# Patient Record
Sex: MALE | Race: WHITE | NOT HISPANIC OR LATINO | Employment: UNEMPLOYED | URBAN - METROPOLITAN AREA
[De-identification: names, ages, dates, MRNs, and addresses within clinical notes are randomized per-mention and may not be internally consistent; named-entity substitution may affect disease eponyms.]

---

## 2022-10-11 DIAGNOSIS — F20.1 DISORGANIZED SCHIZOPHRENIA, CHRONIC CONDITION (HCC): Primary | ICD-10-CM

## 2022-10-11 RX ORDER — CLOZAPINE 100 MG/1
100 TABLET ORAL
Qty: 30 TABLET | Refills: 0 | Status: SHIPPED | OUTPATIENT
Start: 2022-10-11

## 2022-10-11 NOTE — TELEPHONE ENCOUNTER
Medication Refill Request     Name of Medication Clozapine  Dose/Frequency 100mg qhs  Quantity 30  Verified pharmacy   [x]  Verified ordering Provider   [x]  Does patient have enough for the next 3 days? Yes [] No [x]  Does patient have a follow-up appointment scheduled?  Yes [x] No []   If so when is appointment: 11/07/2022

## 2022-11-02 ENCOUNTER — HOSPITAL ENCOUNTER (INPATIENT)
Facility: HOSPITAL | Age: 62
LOS: 15 days | Discharge: NURSING, REHAB OR LONG-TERM CARE | End: 2022-11-18
Attending: EMERGENCY MEDICINE | Admitting: INTERNAL MEDICINE

## 2022-11-02 ENCOUNTER — APPOINTMENT (EMERGENCY)
Dept: RADIOLOGY | Facility: HOSPITAL | Age: 62
End: 2022-11-02

## 2022-11-02 DIAGNOSIS — F20.1 DISORGANIZED SCHIZOPHRENIA (HCC): ICD-10-CM

## 2022-11-02 DIAGNOSIS — D72.829 LEUKOCYTOSIS: ICD-10-CM

## 2022-11-02 DIAGNOSIS — E83.52 HYPERCALCEMIA: ICD-10-CM

## 2022-11-02 DIAGNOSIS — C34.92 PRIMARY MALIGNANT NEOPLASM OF LEFT LUNG METASTATIC TO OTHER SITE (HCC): ICD-10-CM

## 2022-11-02 DIAGNOSIS — R93.2 ABNORMAL CT SCAN, GALLBLADDER: ICD-10-CM

## 2022-11-02 DIAGNOSIS — E11.9 TYPE 2 DIABETES MELLITUS WITHOUT COMPLICATION, WITHOUT LONG-TERM CURRENT USE OF INSULIN (HCC): ICD-10-CM

## 2022-11-02 DIAGNOSIS — R53.1 GENERALIZED WEAKNESS: ICD-10-CM

## 2022-11-02 DIAGNOSIS — R73.09 ELEVATED GLUCOSE: ICD-10-CM

## 2022-11-02 DIAGNOSIS — C79.9 METASTATIC CANCER (HCC): Primary | ICD-10-CM

## 2022-11-02 DIAGNOSIS — F20.9 SCHIZOPHRENIA, UNSPECIFIED TYPE (HCC): ICD-10-CM

## 2022-11-02 DIAGNOSIS — K81.0 ACUTE CHOLECYSTITIS: ICD-10-CM

## 2022-11-02 RX ORDER — KETOROLAC TROMETHAMINE 30 MG/ML
15 INJECTION, SOLUTION INTRAMUSCULAR; INTRAVENOUS ONCE
Status: COMPLETED | OUTPATIENT
Start: 2022-11-03 | End: 2022-11-03

## 2022-11-02 RX ORDER — HALOPERIDOL 5 MG/ML
5 INJECTION INTRAMUSCULAR ONCE
Status: COMPLETED | OUTPATIENT
Start: 2022-11-03 | End: 2022-11-03

## 2022-11-02 RX ADMIN — SODIUM CHLORIDE 1000 ML: 0.9 INJECTION, SOLUTION INTRAVENOUS at 23:50

## 2022-11-03 ENCOUNTER — APPOINTMENT (INPATIENT)
Dept: CT IMAGING | Facility: HOSPITAL | Age: 62
End: 2022-11-03

## 2022-11-03 ENCOUNTER — APPOINTMENT (INPATIENT)
Dept: NON INVASIVE DIAGNOSTICS | Facility: HOSPITAL | Age: 62
End: 2022-11-03

## 2022-11-03 ENCOUNTER — APPOINTMENT (EMERGENCY)
Dept: CT IMAGING | Facility: HOSPITAL | Age: 62
End: 2022-11-03

## 2022-11-03 ENCOUNTER — APPOINTMENT (INPATIENT)
Dept: ULTRASOUND IMAGING | Facility: HOSPITAL | Age: 62
End: 2022-11-03

## 2022-11-03 PROBLEM — R60.0 PEDAL EDEMA: Status: ACTIVE | Noted: 2022-11-03

## 2022-11-03 PROBLEM — R93.2 ABNORMAL CT SCAN, GALLBLADDER: Status: ACTIVE | Noted: 2022-11-03

## 2022-11-03 PROBLEM — R73.09 ELEVATED GLUCOSE: Status: ACTIVE | Noted: 2022-01-01

## 2022-11-03 PROBLEM — E87.1 HYPONATREMIA: Status: ACTIVE | Noted: 2022-11-03

## 2022-11-03 PROBLEM — R65.10 SIRS (SYSTEMIC INFLAMMATORY RESPONSE SYNDROME) (HCC): Status: ACTIVE | Noted: 2022-11-03

## 2022-11-03 PROBLEM — E83.52 HYPERCALCEMIA: Status: ACTIVE | Noted: 2022-11-03

## 2022-11-03 PROBLEM — F20.9 SCHIZOPHRENIA (HCC): Status: ACTIVE | Noted: 2022-11-03

## 2022-11-03 PROBLEM — C34.92 PRIMARY MALIGNANT NEOPLASM OF LEFT LUNG METASTATIC TO OTHER SITE (HCC): Status: ACTIVE | Noted: 2022-01-01

## 2022-11-03 LAB
ALBUMIN SERPL BCP-MCNC: 2.1 G/DL (ref 3.5–5)
ALBUMIN SERPL BCP-MCNC: 2.9 G/DL (ref 3.5–5)
ALP SERPL-CCNC: 132 U/L (ref 46–116)
ALP SERPL-CCNC: 94 U/L (ref 46–116)
ALT SERPL W P-5'-P-CCNC: 22 U/L (ref 12–78)
ALT SERPL W P-5'-P-CCNC: 31 U/L (ref 12–78)
ANION GAP SERPL CALCULATED.3IONS-SCNC: 3 MMOL/L (ref 4–13)
ANION GAP SERPL CALCULATED.3IONS-SCNC: 6 MMOL/L (ref 4–13)
APTT PPP: 29 SECONDS (ref 23–37)
APTT PPP: 34 SECONDS (ref 23–37)
APTT PPP: 74 SECONDS (ref 23–37)
AST SERPL W P-5'-P-CCNC: 16 U/L (ref 5–45)
AST SERPL W P-5'-P-CCNC: 17 U/L (ref 5–45)
BACTERIA UR QL AUTO: NORMAL /HPF
BASOPHILS # BLD AUTO: 0.03 THOUSANDS/ÂΜL (ref 0–0.1)
BASOPHILS # BLD AUTO: 0.04 THOUSANDS/ÂΜL (ref 0–0.1)
BASOPHILS NFR BLD AUTO: 0 % (ref 0–1)
BASOPHILS NFR BLD AUTO: 0 % (ref 0–1)
BILIRUB SERPL-MCNC: 0.8 MG/DL (ref 0.2–1)
BILIRUB SERPL-MCNC: 1.1 MG/DL (ref 0.2–1)
BILIRUB UR QL STRIP: NEGATIVE
BUN SERPL-MCNC: 19 MG/DL (ref 5–25)
BUN SERPL-MCNC: 23 MG/DL (ref 5–25)
CA-I BLD-SCNC: 1.18 MMOL/L (ref 1.12–1.32)
CALCIUM ALBUM COR SERPL-MCNC: 10.3 MG/DL (ref 8.3–10.1)
CALCIUM ALBUM COR SERPL-MCNC: 11.5 MG/DL (ref 8.3–10.1)
CALCIUM SERPL-MCNC: 10.6 MG/DL (ref 8.3–10.1)
CALCIUM SERPL-MCNC: 8.8 MG/DL (ref 8.3–10.1)
CHLORIDE SERPL-SCNC: 91 MMOL/L (ref 96–108)
CHLORIDE SERPL-SCNC: 97 MMOL/L (ref 96–108)
CLARITY UR: CLEAR
CO2 SERPL-SCNC: 32 MMOL/L (ref 21–32)
CO2 SERPL-SCNC: 32 MMOL/L (ref 21–32)
COLOR UR: YELLOW
CREAT SERPL-MCNC: 0.71 MG/DL (ref 0.6–1.3)
CREAT SERPL-MCNC: 0.78 MG/DL (ref 0.6–1.3)
EOSINOPHIL # BLD AUTO: 0.06 THOUSAND/ÂΜL (ref 0–0.61)
EOSINOPHIL # BLD AUTO: 0.08 THOUSAND/ÂΜL (ref 0–0.61)
EOSINOPHIL NFR BLD AUTO: 0 % (ref 0–6)
EOSINOPHIL NFR BLD AUTO: 1 % (ref 0–6)
ERYTHROCYTE [DISTWIDTH] IN BLOOD BY AUTOMATED COUNT: 15.4 % (ref 11.6–15.1)
ERYTHROCYTE [DISTWIDTH] IN BLOOD BY AUTOMATED COUNT: 15.5 % (ref 11.6–15.1)
ERYTHROCYTE [DISTWIDTH] IN BLOOD BY AUTOMATED COUNT: 15.7 % (ref 11.6–15.1)
EST. AVERAGE GLUCOSE BLD GHB EST-MCNC: 214 MG/DL
FERRITIN SERPL-MCNC: 1189 NG/ML (ref 8–388)
FLUAV RNA RESP QL NAA+PROBE: NEGATIVE
FLUBV RNA RESP QL NAA+PROBE: NEGATIVE
GFR SERPL CREATININE-BSD FRML MDRD: 100 ML/MIN/1.73SQ M
GFR SERPL CREATININE-BSD FRML MDRD: 96 ML/MIN/1.73SQ M
GLUCOSE SERPL-MCNC: 122 MG/DL (ref 65–140)
GLUCOSE SERPL-MCNC: 183 MG/DL (ref 65–140)
GLUCOSE SERPL-MCNC: 206 MG/DL (ref 65–140)
GLUCOSE SERPL-MCNC: 213 MG/DL (ref 65–140)
GLUCOSE SERPL-MCNC: 233 MG/DL (ref 65–140)
GLUCOSE SERPL-MCNC: 287 MG/DL (ref 65–140)
GLUCOSE SERPL-MCNC: 333 MG/DL (ref 65–140)
GLUCOSE UR STRIP-MCNC: ABNORMAL MG/DL
HBA1C MFR BLD: 9.1 %
HCT VFR BLD AUTO: 27.1 % (ref 36.5–49.3)
HCT VFR BLD AUTO: 28 % (ref 36.5–49.3)
HCT VFR BLD AUTO: 35.3 % (ref 36.5–49.3)
HGB BLD-MCNC: 12.1 G/DL (ref 12–17)
HGB BLD-MCNC: 9.2 G/DL (ref 12–17)
HGB BLD-MCNC: 9.5 G/DL (ref 12–17)
HGB UR QL STRIP.AUTO: ABNORMAL
IMM GRANULOCYTES # BLD AUTO: 0.21 THOUSAND/UL (ref 0–0.2)
IMM GRANULOCYTES # BLD AUTO: 0.26 THOUSAND/UL (ref 0–0.2)
IMM GRANULOCYTES NFR BLD AUTO: 1 % (ref 0–2)
IMM GRANULOCYTES NFR BLD AUTO: 2 % (ref 0–2)
INR PPP: 1 (ref 0.84–1.19)
INR PPP: 1.08 (ref 0.84–1.19)
INR PPP: 1.08 (ref 0.84–1.19)
IRON SATN MFR SERPL: 43 % (ref 20–50)
IRON SERPL-MCNC: 80 UG/DL (ref 65–175)
KETONES UR STRIP-MCNC: ABNORMAL MG/DL
LACTATE SERPL-SCNC: 1.1 MMOL/L (ref 0.5–2)
LEUKOCYTE ESTERASE UR QL STRIP: NEGATIVE
LYMPHOCYTES # BLD AUTO: 2.11 THOUSANDS/ÂΜL (ref 0.6–4.47)
LYMPHOCYTES # BLD AUTO: 3.35 THOUSANDS/ÂΜL (ref 0.6–4.47)
LYMPHOCYTES NFR BLD AUTO: 14 % (ref 14–44)
LYMPHOCYTES NFR BLD AUTO: 19 % (ref 14–44)
MAGNESIUM SERPL-MCNC: 1.5 MG/DL (ref 1.6–2.6)
MCH RBC QN AUTO: 30.7 PG (ref 26.8–34.3)
MCH RBC QN AUTO: 30.8 PG (ref 26.8–34.3)
MCH RBC QN AUTO: 31 PG (ref 26.8–34.3)
MCHC RBC AUTO-ENTMCNC: 33.9 G/DL (ref 31.4–37.4)
MCHC RBC AUTO-ENTMCNC: 33.9 G/DL (ref 31.4–37.4)
MCHC RBC AUTO-ENTMCNC: 34.3 G/DL (ref 31.4–37.4)
MCV RBC AUTO: 91 FL (ref 82–98)
MONOCYTES # BLD AUTO: 0.91 THOUSAND/ÂΜL (ref 0.17–1.22)
MONOCYTES # BLD AUTO: 0.94 THOUSAND/ÂΜL (ref 0.17–1.22)
MONOCYTES NFR BLD AUTO: 5 % (ref 4–12)
MONOCYTES NFR BLD AUTO: 6 % (ref 4–12)
NEUTROPHILS # BLD AUTO: 12.15 THOUSANDS/ÂΜL (ref 1.85–7.62)
NEUTROPHILS # BLD AUTO: 12.72 THOUSANDS/ÂΜL (ref 1.85–7.62)
NEUTS SEG NFR BLD AUTO: 73 % (ref 43–75)
NEUTS SEG NFR BLD AUTO: 79 % (ref 43–75)
NITRITE UR QL STRIP: NEGATIVE
NON-SQ EPI CELLS URNS QL MICRO: NORMAL /HPF
NRBC BLD AUTO-RTO: 0 /100 WBCS
NRBC BLD AUTO-RTO: 0 /100 WBCS
PH UR STRIP.AUTO: 6 [PH]
PHOSPHATE SERPL-MCNC: 3.3 MG/DL (ref 2.3–4.1)
PLATELET # BLD AUTO: 180 THOUSANDS/UL (ref 149–390)
PLATELET # BLD AUTO: 201 THOUSANDS/UL (ref 149–390)
PLATELET # BLD AUTO: 258 THOUSANDS/UL (ref 149–390)
PMV BLD AUTO: 9.4 FL (ref 8.9–12.7)
PMV BLD AUTO: 9.6 FL (ref 8.9–12.7)
PMV BLD AUTO: 9.6 FL (ref 8.9–12.7)
POTASSIUM SERPL-SCNC: 3.2 MMOL/L (ref 3.5–5.3)
POTASSIUM SERPL-SCNC: 3.7 MMOL/L (ref 3.5–5.3)
PROCALCITONIN SERPL-MCNC: 10.74 NG/ML
PROCALCITONIN SERPL-MCNC: 11.67 NG/ML
PROT SERPL-MCNC: 5.5 G/DL (ref 6.4–8.4)
PROT SERPL-MCNC: 7.6 G/DL (ref 6.4–8.4)
PROT UR STRIP-MCNC: ABNORMAL MG/DL
PROTHROMBIN TIME: 13.9 SECONDS (ref 11.6–14.5)
PROTHROMBIN TIME: 14.8 SECONDS (ref 11.6–14.5)
PROTHROMBIN TIME: 14.8 SECONDS (ref 11.6–14.5)
RBC # BLD AUTO: 2.99 MILLION/UL (ref 3.88–5.62)
RBC # BLD AUTO: 3.09 MILLION/UL (ref 3.88–5.62)
RBC # BLD AUTO: 3.9 MILLION/UL (ref 3.88–5.62)
RBC #/AREA URNS AUTO: NORMAL /HPF
RSV RNA RESP QL NAA+PROBE: NEGATIVE
SARS-COV-2 RNA RESP QL NAA+PROBE: NEGATIVE
SODIUM SERPL-SCNC: 129 MMOL/L (ref 135–147)
SODIUM SERPL-SCNC: 132 MMOL/L (ref 135–147)
SP GR UR STRIP.AUTO: 1.01 (ref 1–1.03)
TIBC SERPL-MCNC: 188 UG/DL (ref 250–450)
UROBILINOGEN UR STRIP-ACNC: <2 MG/DL
WBC # BLD AUTO: 15.5 THOUSAND/UL (ref 4.31–10.16)
WBC # BLD AUTO: 17.36 THOUSAND/UL (ref 4.31–10.16)
WBC # BLD AUTO: 20.09 THOUSAND/UL (ref 4.31–10.16)
WBC #/AREA URNS AUTO: NORMAL /HPF

## 2022-11-03 RX ORDER — INSULIN LISPRO 100 [IU]/ML
1-5 INJECTION, SOLUTION INTRAVENOUS; SUBCUTANEOUS EVERY 6 HOURS SCHEDULED
Status: DISCONTINUED | OUTPATIENT
Start: 2022-11-04 | End: 2022-11-04

## 2022-11-03 RX ORDER — HEPARIN SODIUM 10000 [USP'U]/100ML
3-34 INJECTION, SOLUTION INTRAVENOUS
Status: DISCONTINUED | OUTPATIENT
Start: 2022-11-03 | End: 2022-11-13

## 2022-11-03 RX ORDER — CEFTRIAXONE 1 G/50ML
1000 INJECTION, SOLUTION INTRAVENOUS EVERY 24 HOURS
Status: DISCONTINUED | OUTPATIENT
Start: 2022-11-03 | End: 2022-11-03

## 2022-11-03 RX ORDER — SENNOSIDES 8.6 MG
1 TABLET ORAL
Status: DISCONTINUED | OUTPATIENT
Start: 2022-11-03 | End: 2022-11-15

## 2022-11-03 RX ORDER — SODIUM CHLORIDE, SODIUM GLUCONATE, SODIUM ACETATE, POTASSIUM CHLORIDE, MAGNESIUM CHLORIDE, SODIUM PHOSPHATE, DIBASIC, AND POTASSIUM PHOSPHATE .53; .5; .37; .037; .03; .012; .00082 G/100ML; G/100ML; G/100ML; G/100ML; G/100ML; G/100ML; G/100ML
125 INJECTION, SOLUTION INTRAVENOUS CONTINUOUS
Status: DISCONTINUED | OUTPATIENT
Start: 2022-11-03 | End: 2022-11-03

## 2022-11-03 RX ORDER — INSULIN LISPRO 100 [IU]/ML
4 INJECTION, SOLUTION INTRAVENOUS; SUBCUTANEOUS
Status: DISCONTINUED | OUTPATIENT
Start: 2022-11-03 | End: 2022-11-03

## 2022-11-03 RX ORDER — HEPARIN SODIUM 1000 [USP'U]/ML
5600 INJECTION, SOLUTION INTRAVENOUS; SUBCUTANEOUS ONCE
Status: COMPLETED | OUTPATIENT
Start: 2022-11-03 | End: 2022-11-03

## 2022-11-03 RX ORDER — INSULIN LISPRO 100 [IU]/ML
1-5 INJECTION, SOLUTION INTRAVENOUS; SUBCUTANEOUS
Status: DISCONTINUED | OUTPATIENT
Start: 2022-11-03 | End: 2022-11-03

## 2022-11-03 RX ORDER — INSULIN GLARGINE 100 [IU]/ML
6 INJECTION, SOLUTION SUBCUTANEOUS
Status: DISCONTINUED | OUTPATIENT
Start: 2022-11-03 | End: 2022-11-03

## 2022-11-03 RX ORDER — POTASSIUM CHLORIDE 20 MEQ/1
40 TABLET, EXTENDED RELEASE ORAL ONCE
Status: COMPLETED | OUTPATIENT
Start: 2022-11-03 | End: 2022-11-03

## 2022-11-03 RX ORDER — INSULIN GLARGINE 100 [IU]/ML
8 INJECTION, SOLUTION SUBCUTANEOUS
Status: DISCONTINUED | OUTPATIENT
Start: 2022-11-03 | End: 2022-11-03

## 2022-11-03 RX ORDER — OLANZAPINE 5 MG/1
5 TABLET ORAL
Status: DISCONTINUED | OUTPATIENT
Start: 2022-11-03 | End: 2022-11-18 | Stop reason: HOSPADM

## 2022-11-03 RX ORDER — SODIUM CHLORIDE, SODIUM GLUCONATE, SODIUM ACETATE, POTASSIUM CHLORIDE, MAGNESIUM CHLORIDE, SODIUM PHOSPHATE, DIBASIC, AND POTASSIUM PHOSPHATE .53; .5; .37; .037; .03; .012; .00082 G/100ML; G/100ML; G/100ML; G/100ML; G/100ML; G/100ML; G/100ML
75 INJECTION, SOLUTION INTRAVENOUS CONTINUOUS
Status: DISPENSED | OUTPATIENT
Start: 2022-11-03 | End: 2022-11-04

## 2022-11-03 RX ORDER — NICOTINE 21 MG/24HR
1 PATCH, TRANSDERMAL 24 HOURS TRANSDERMAL DAILY
Status: DISCONTINUED | OUTPATIENT
Start: 2022-11-03 | End: 2022-11-18 | Stop reason: HOSPADM

## 2022-11-03 RX ORDER — SODIUM CHLORIDE, SODIUM GLUCONATE, SODIUM ACETATE, POTASSIUM CHLORIDE, MAGNESIUM CHLORIDE, SODIUM PHOSPHATE, DIBASIC, AND POTASSIUM PHOSPHATE .53; .5; .37; .037; .03; .012; .00082 G/100ML; G/100ML; G/100ML; G/100ML; G/100ML; G/100ML; G/100ML
1000 INJECTION, SOLUTION INTRAVENOUS ONCE
Status: COMPLETED | OUTPATIENT
Start: 2022-11-03 | End: 2022-11-03

## 2022-11-03 RX ORDER — ONDANSETRON 2 MG/ML
4 INJECTION INTRAMUSCULAR; INTRAVENOUS EVERY 6 HOURS PRN
Status: DISCONTINUED | OUTPATIENT
Start: 2022-11-03 | End: 2022-11-18 | Stop reason: HOSPADM

## 2022-11-03 RX ORDER — ACETAMINOPHEN 325 MG/1
650 TABLET ORAL EVERY 8 HOURS PRN
Status: DISCONTINUED | OUTPATIENT
Start: 2022-11-03 | End: 2022-11-15

## 2022-11-03 RX ORDER — ENOXAPARIN SODIUM 100 MG/ML
40 INJECTION SUBCUTANEOUS DAILY
Status: DISCONTINUED | OUTPATIENT
Start: 2022-11-03 | End: 2022-11-03 | Stop reason: ALTCHOICE

## 2022-11-03 RX ORDER — LIDOCAINE 50 MG/G
1 PATCH TOPICAL DAILY
Status: DISCONTINUED | OUTPATIENT
Start: 2022-11-03 | End: 2022-11-18 | Stop reason: HOSPADM

## 2022-11-03 RX ORDER — MAGNESIUM SULFATE HEPTAHYDRATE 40 MG/ML
2 INJECTION, SOLUTION INTRAVENOUS ONCE
Status: COMPLETED | OUTPATIENT
Start: 2022-11-03 | End: 2022-11-03

## 2022-11-03 RX ADMIN — MORPHINE SULFATE 2 MG: 2 INJECTION, SOLUTION INTRAMUSCULAR; INTRAVENOUS at 19:31

## 2022-11-03 RX ADMIN — INSULIN LISPRO 4 UNITS: 100 INJECTION, SOLUTION INTRAVENOUS; SUBCUTANEOUS at 12:38

## 2022-11-03 RX ADMIN — OLANZAPINE 5 MG: 5 TABLET, FILM COATED ORAL at 22:38

## 2022-11-03 RX ADMIN — HEPARIN SODIUM 5600 UNITS: 1000 INJECTION INTRAVENOUS; SUBCUTANEOUS at 12:41

## 2022-11-03 RX ADMIN — ACETAMINOPHEN 650 MG: 325 TABLET, FILM COATED ORAL at 15:55

## 2022-11-03 RX ADMIN — NICOTINE 1 PATCH: 21 PATCH, EXTENDED RELEASE TRANSDERMAL at 08:18

## 2022-11-03 RX ADMIN — HEPARIN SODIUM 18 UNITS/KG/HR: 10000 INJECTION, SOLUTION INTRAVENOUS at 12:42

## 2022-11-03 RX ADMIN — INSULIN LISPRO 2 UNITS: 100 INJECTION, SOLUTION INTRAVENOUS; SUBCUTANEOUS at 23:41

## 2022-11-03 RX ADMIN — SODIUM CHLORIDE, SODIUM GLUCONATE, SODIUM ACETATE, POTASSIUM CHLORIDE AND MAGNESIUM CHLORIDE 1000 ML: 526; 502; 368; 37; 30 INJECTION, SOLUTION INTRAVENOUS at 01:05

## 2022-11-03 RX ADMIN — PIPERACILLIN AND TAZOBACTAM 3.38 G: 3; .375 INJECTION, POWDER, LYOPHILIZED, FOR SOLUTION INTRAVENOUS at 22:39

## 2022-11-03 RX ADMIN — IOHEXOL 100 ML: 350 INJECTION, SOLUTION INTRAVENOUS at 01:24

## 2022-11-03 RX ADMIN — MORPHINE SULFATE 2 MG: 2 INJECTION, SOLUTION INTRAMUSCULAR; INTRAVENOUS at 08:47

## 2022-11-03 RX ADMIN — INSULIN LISPRO 4 UNITS: 100 INJECTION, SOLUTION INTRAVENOUS; SUBCUTANEOUS at 17:53

## 2022-11-03 RX ADMIN — INSULIN LISPRO 4 UNITS: 100 INJECTION, SOLUTION INTRAVENOUS; SUBCUTANEOUS at 08:24

## 2022-11-03 RX ADMIN — MAGNESIUM SULFATE HEPTAHYDRATE 2 G: 2 INJECTION, SOLUTION INTRAVENOUS at 20:19

## 2022-11-03 RX ADMIN — SODIUM CHLORIDE, SODIUM GLUCONATE, SODIUM ACETATE, POTASSIUM CHLORIDE, MAGNESIUM CHLORIDE, SODIUM PHOSPHATE, DIBASIC, AND POTASSIUM PHOSPHATE 75 ML/HR: .53; .5; .37; .037; .03; .012; .00082 INJECTION, SOLUTION INTRAVENOUS at 23:32

## 2022-11-03 RX ADMIN — ACETAMINOPHEN 650 MG: 325 TABLET, FILM COATED ORAL at 03:02

## 2022-11-03 RX ADMIN — PIPERACILLIN AND TAZOBACTAM 3.38 G: 3; .375 INJECTION, POWDER, LYOPHILIZED, FOR SOLUTION INTRAVENOUS at 02:39

## 2022-11-03 RX ADMIN — SODIUM CHLORIDE, SODIUM GLUCONATE, SODIUM ACETATE, POTASSIUM CHLORIDE, MAGNESIUM CHLORIDE, SODIUM PHOSPHATE, DIBASIC, AND POTASSIUM PHOSPHATE 125 ML/HR: .53; .5; .37; .037; .03; .012; .00082 INJECTION, SOLUTION INTRAVENOUS at 11:57

## 2022-11-03 RX ADMIN — HALOPERIDOL LACTATE 5 MG: 5 INJECTION, SOLUTION INTRAMUSCULAR at 00:04

## 2022-11-03 RX ADMIN — SODIUM CHLORIDE, SODIUM GLUCONATE, SODIUM ACETATE, POTASSIUM CHLORIDE, MAGNESIUM CHLORIDE, SODIUM PHOSPHATE, DIBASIC, AND POTASSIUM PHOSPHATE 125 ML/HR: .53; .5; .37; .037; .03; .012; .00082 INJECTION, SOLUTION INTRAVENOUS at 03:36

## 2022-11-03 RX ADMIN — KETOROLAC TROMETHAMINE 15 MG: 30 INJECTION, SOLUTION INTRAMUSCULAR; INTRAVENOUS at 00:02

## 2022-11-03 RX ADMIN — IOHEXOL 85 ML: 350 INJECTION, SOLUTION INTRAVENOUS at 20:12

## 2022-11-03 RX ADMIN — MORPHINE SULFATE 2 MG: 2 INJECTION, SOLUTION INTRAMUSCULAR; INTRAVENOUS at 13:10

## 2022-11-03 RX ADMIN — PIPERACILLIN AND TAZOBACTAM 3.38 G: 3; .375 INJECTION, POWDER, LYOPHILIZED, FOR SOLUTION INTRAVENOUS at 08:46

## 2022-11-03 RX ADMIN — LIDOCAINE 5% 1 PATCH: 700 PATCH TOPICAL at 11:57

## 2022-11-03 RX ADMIN — POTASSIUM CHLORIDE 40 MEQ: 1500 TABLET, EXTENDED RELEASE ORAL at 08:25

## 2022-11-03 RX ADMIN — INSULIN LISPRO 1 UNITS: 100 INJECTION, SOLUTION INTRAVENOUS; SUBCUTANEOUS at 17:53

## 2022-11-03 RX ADMIN — MORPHINE SULFATE 2 MG: 2 INJECTION, SOLUTION INTRAMUSCULAR; INTRAVENOUS at 23:33

## 2022-11-03 RX ADMIN — PIPERACILLIN AND TAZOBACTAM 3.38 G: 3; .375 INJECTION, POWDER, LYOPHILIZED, FOR SOLUTION INTRAVENOUS at 15:55

## 2022-11-03 RX ADMIN — ENOXAPARIN SODIUM 40 MG: 100 INJECTION SUBCUTANEOUS at 08:23

## 2022-11-03 NOTE — PLAN OF CARE
Problem: Nutrition/Hydration-ADULT  Goal: Nutrient/Hydration intake appropriate for improving, restoring or maintaining nutritional needs  Description: Monitor and assess patient's nutrition/hydration status for malnutrition  Collaborate with interdisciplinary team and initiate plan and interventions as ordered  Monitor patient's weight and dietary intake as ordered or per policy  Utilize nutrition screening tool and intervene as necessary  Determine patient's food preferences and provide high-protein, high-caloric foods as appropriate       INTERVENTIONS:  - Monitor oral intake, urinary output, labs, and treatment plans  - Assess nutrition and hydration status and recommend course of action  - Evaluate amount of meals eaten  - Assist patient with eating if necessary   - Allow adequate time for meals  - Recommend/ encourage appropriate diets, oral nutritional supplements, and vitamin/mineral supplements  - Order, calculate, and assess calorie counts as needed  - Recommend, monitor, and adjust tube feedings and TPN/PPN based on assessed needs  - Assess need for intravenous fluids  - Provide specific nutrition/hydration education as appropriate  - Include patient/family/caregiver in decisions related to nutrition  Outcome: Progressing     Problem: MOBILITY - ADULT  Goal: Maintain or return to baseline ADL function  Description: INTERVENTIONS:  -  Assess patient's ability to carry out ADLs; assess patient's baseline for ADL function and identify physical deficits which impact ability to perform ADLs (bathing, care of mouth/teeth, toileting, grooming, dressing, etc )  - Assess/evaluate cause of self-care deficits   - Assess range of motion  - Assess patient's mobility; develop plan if impaired  - Assess patient's need for assistive devices and provide as appropriate  - Encourage maximum independence but intervene and supervise when necessary  - Involve family in performance of ADLs  - Assess for home care needs following discharge   - Consider OT consult to assist with ADL evaluation and planning for discharge  - Provide patient education as appropriate  Outcome: Progressing  Goal: Maintains/Returns to pre admission functional level  Description: INTERVENTIONS:  - Perform BMAT or MOVE assessment daily    - Set and communicate daily mobility goal to care team and patient/family/caregiver  - Collaborate with rehabilitation services on mobility goals if consulted  - Perform Range of Motion x times a day  - Reposition patient every x hours    - Dangle patient x times a day  - Stand patient x times a day  - Ambulate patient x times a day  - Out of bed to chair x times a day   - Out of bed for meals x times a day  - Out of bed for toileting  - Record patient progress and toleration of activity level   Outcome: Progressing     Problem: Prexisting or High Potential for Compromised Skin Integrity  Goal: Skin integrity is maintained or improved  Description: INTERVENTIONS:  - Identify patients at risk for skin breakdown  - Assess and monitor skin integrity  - Assess and monitor nutrition and hydration status  - Monitor labs   - Assess for incontinence   - Turn and reposition patient  - Assist with mobility/ambulation  - Relieve pressure over bony prominences  - Avoid friction and shearing  - Provide appropriate hygiene as needed including keeping skin clean and dry  - Evaluate need for skin moisturizer/barrier cream  - Collaborate with interdisciplinary team   - Patient/family teaching  - Consider wound care consult   Outcome: Progressing     Problem: PAIN - ADULT  Goal: Verbalizes/displays adequate comfort level or baseline comfort level  Description: Interventions:  - Encourage patient to monitor pain and request assistance  - Assess pain using appropriate pain scale  - Administer analgesics based on type and severity of pain and evaluate response  - Implement non-pharmacological measures as appropriate and evaluate response  - Consider cultural and social influences on pain and pain management  - Notify physician/advanced practitioner if interventions unsuccessful or patient reports new pain  Outcome: Progressing     Problem: INFECTION - ADULT  Goal: Absence or prevention of progression during hospitalization  Description: INTERVENTIONS:  - Assess and monitor for signs and symptoms of infection  - Monitor lab/diagnostic results  - Monitor all insertion sites, i e  indwelling lines, tubes, and drains  - Monitor endotracheal if appropriate and nasal secretions for changes in amount and color  - Mather appropriate cooling/warming therapies per order  - Administer medications as ordered  - Instruct and encourage patient and family to use good hand hygiene technique  - Identify and instruct in appropriate isolation precautions for identified infection/condition  Outcome: Progressing  Goal: Absence of fever/infection during neutropenic period  Description: INTERVENTIONS:  - Monitor WBC    Outcome: Progressing     Problem: SAFETY ADULT  Goal: Maintain or return to baseline ADL function  Description: INTERVENTIONS:  -  Assess patient's ability to carry out ADLs; assess patient's baseline for ADL function and identify physical deficits which impact ability to perform ADLs (bathing, care of mouth/teeth, toileting, grooming, dressing, etc )  - Assess/evaluate cause of self-care deficits   - Assess range of motion  - Assess patient's mobility; develop plan if impaired  - Assess patient's need for assistive devices and provide as appropriate  - Encourage maximum independence but intervene and supervise when necessary  - Involve family in performance of ADLs  - Assess for home care needs following discharge   - Consider OT consult to assist with ADL evaluation and planning for discharge  - Provide patient education as appropriate  Outcome: Progressing  Goal: Maintains/Returns to pre admission functional level  Description: INTERVENTIONS:  - Perform BMAT or MOVE assessment daily    - Set and communicate daily mobility goal to care team and patient/family/caregiver  - Collaborate with rehabilitation services on mobility goals if consulted  - Perform Range of Motion x times a day  - Reposition patient every x hours    - Dangle patient x times a day  - Stand patient x times a day  - Ambulate patient x times a day  - Out of bed to chair x times a day   - Out of bed for meals x times a day  - Out of bed for toileting  - Record patient progress and toleration of activity level   Outcome: Progressing  Goal: Patient will remain free of falls  Description: INTERVENTIONS:  - Educate patient/family on patient safety including physical limitations  - Instruct patient to call for assistance with activity   - Consult OT/PT to assist with strengthening/mobility   - Keep Call bell within reach  - Keep bed low and locked with side rails adjusted as appropriate  - Keep care items and personal belongings within reach  - Initiate and maintain comfort rounds  - Make Fall Risk Sign visible to staff  - Offer Toileting every x Hours, in advance of need  - Initiate/Maintain xxxxalarm  - Obtain necessary fall risk management equipment: x  - Apply yellow socks and bracelet for high fall risk patients  - Consider moving patient to room near nurses station  Outcome: Progressing     Problem: DISCHARGE PLANNING  Goal: Discharge to home or other facility with appropriate resources  Description: INTERVENTIONS:  - Identify barriers to discharge w/patient and caregiver  - Arrange for needed discharge resources and transportation as appropriate  - Identify discharge learning needs (meds, wound care, etc )  - Arrange for interpretive services to assist at discharge as needed  - Refer to Case Management Department for coordinating discharge planning if the patient needs post-hospital services based on physician/advanced practitioner order or complex needs related to functional status, cognitive ability, or social support system  Outcome: Progressing     Problem: Knowledge Deficit  Goal: Patient/family/caregiver demonstrates understanding of disease process, treatment plan, medications, and discharge instructions  Description: Complete learning assessment and assess knowledge base    Interventions:  - Provide teaching at level of understanding  - Provide teaching via preferred learning methods  Outcome: Progressing   x

## 2022-11-03 NOTE — ED CARE HANDOFF
Emergency Department Sign Out Note        Sign out and transfer of care from Dr Shanita Bess  See Separate Emergency Department note  The patient, Quinn Tee, was evaluated by the previous provider for generalized weakness  Workup Completed:  CT abdomen pelvis with contrast   Final Result      1   2 5 x 2 2 x 2 2 cm mass in the left lower lobe concerning for lung cancer  2   Lytic lesions within the left 6th rib and pelvis bones compatible with osseous metastasis  3   2   Small left pleural effusion  4   3   Small anterior pericardial effusion  5   Small wedge-shaped hypodensities within bilateral kidneys may reflect scarring versus small renal infarcts  6   Other findings as above        I personally discussed this study with Dr Adrian Mcdonnell on 11/3/2022 at 1:54 AM          Workstation performed: [id number]         XR chest portable   ED Interpretation   No infiltrate, effusion or pneumothorax      US right upper quadrant    (Results Pending)      Labs Reviewed   CBC AND DIFFERENTIAL - Abnormal       Result Value Ref Range Status    WBC 17 36 (*) 4 31 - 10 16 Thousand/uL Final    RBC 3 90  3 88 - 5 62 Million/uL Final    Hemoglobin 12 1  12 0 - 17 0 g/dL Final    Hematocrit 35 3 (*) 36 5 - 49 3 % Final    MCV 91  82 - 98 fL Final    MCH 31 0  26 8 - 34 3 pg Final    MCHC 34 3  31 4 - 37 4 g/dL Final    RDW 15 5 (*) 11 6 - 15 1 % Final    MPV 9 6  8 9 - 12 7 fL Final    Platelets 140  904 - 390 Thousands/uL Final    nRBC 0  /100 WBCs Final    Neutrophils Relative 73  43 - 75 % Final    Immat GRANS % 2  0 - 2 % Final    Lymphocytes Relative 19  14 - 44 % Final    Monocytes Relative 5  4 - 12 % Final    Eosinophils Relative 1  0 - 6 % Final    Basophils Relative 0  0 - 1 % Final    Neutrophils Absolute 12 72 (*) 1 85 - 7 62 Thousands/µL Final    Immature Grans Absolute 0 26 (*) 0 00 - 0 20 Thousand/uL Final    Lymphocytes Absolute 3 35  0 60 - 4 47 Thousands/µL Final    Monocytes Absolute 0 91  0 17 - 1 22 Thousand/µL Final    Eosinophils Absolute 0 08  0 00 - 0 61 Thousand/µL Final    Basophils Absolute 0 04  0 00 - 0 10 Thousands/µL Final   COMPREHENSIVE METABOLIC PANEL - Abnormal    Sodium 129 (*) 135 - 147 mmol/L Final    Potassium 3 7  3 5 - 5 3 mmol/L Final    Chloride 91 (*) 96 - 108 mmol/L Final    CO2 32  21 - 32 mmol/L Final    ANION GAP 6  4 - 13 mmol/L Final    BUN 23  5 - 25 mg/dL Final    Creatinine 0 78  0 60 - 1 30 mg/dL Final    Comment: Standardized to IDMS reference method    Glucose 233 (*) 65 - 140 mg/dL Final    Comment: If the patient is fasting, the ADA then defines impaired fasting glucose as > 100 mg/dL and diabetes as > or equal to 123 mg/dL  Specimen collection should occur prior to Sulfasalazine administration due to the potential for falsely depressed results  Specimen collection should occur prior to Sulfapyridine administration due to the potential for falsely elevated results  Calcium 10 6 (*) 8 3 - 10 1 mg/dL Final    Corrected Calcium 11 5 (*) 8 3 - 10 1 mg/dL Final    AST 17  5 - 45 U/L Final    Comment: Specimen collection should occur prior to Sulfasalazine administration due to the potential for falsely depressed results  ALT 31  12 - 78 U/L Final    Comment: Specimen collection should occur prior to Sulfasalazine administration due to the potential for falsely depressed results  Alkaline Phosphatase 132 (*) 46 - 116 U/L Final    Total Protein 7 6  6 4 - 8 4 g/dL Final    Albumin 2 9 (*) 3 5 - 5 0 g/dL Final    Total Bilirubin 1 10 (*) 0 20 - 1 00 mg/dL Final    Comment: Use of this assay is not recommended for patients undergoing treatment with eltrombopag due to the potential for falsely elevated results      eGFR 96  ml/min/1 73sq m Final    Narrative:     [place] guidelines for Chronic Kidney Disease (CKD):   •  Stage 1 with normal or high GFR (GFR > 90 mL/min/1 73 square meters)  •  Stage 2 Mild CKD (GFR = 60-89 mL/min/1 73 square meters)  •  Stage 3A Moderate CKD (GFR = 45-59 mL/min/1 73 square meters)  •  Stage 3B Moderate CKD (GFR = 30-44 mL/min/1 73 square meters)  •  Stage 4 Severe CKD (GFR = 15-29 mL/min/1 73 square meters)  •  Stage 5 End Stage CKD (GFR <15 mL/min/1 73 square meters)  Note: GFR calculation is accurate only with a steady state creatinine   PROCALCITONIN TEST - Abnormal    Procalcitonin 11 67 (*) <=0 25 ng/ml Final    Comment: Suspected Lower Respiratory Tract Infection (LRTI):  - LESS than or EQUAL to 0 25 ng/mL:   low likelihood for bacterial LRTI; antibiotics DISCOURAGED   - GREATER than 0 25 ng/mL:   increased likelihood for bacterial LRTI; antibiotics ENCOURAGED  Suspected Sepsis:  - Strongly consider initiating antibiotics in ALL UNSTABLE patients  - LESS than or EQUAL to 0 5 ng/mL:   low likelihood for bacterial sepsis; antibiotics DISCOURAGED   - GREATER than 0 5 ng/mL:   increased likelihood for bacterial sepsis; antibiotics ENCOURAGED   - GREATER than 2 ng/mL:   high risk for severe sepsis / septic shock; antibiotics strongly ENCOURAGED  Decisions on antibiotic use should not be based solely on Procalcitonin (PCT) levels  If PCT is low but uncertainty exists with stopping antibiotics, repeat PCT in 6-24 hours to confirm the low level  If antibiotics are administered (regardless if initial PCT was high or low), repeat PCT every 1-2 days to consider early antibiotic cessation (when GREATER than 80% decrease from the peak OR when PCT drops below designated cutoffs, whichever comes first), so long as the infection is NOT one that typically requires prolonged treatment durations (e g , bone/joint infections, endocarditis, Staph  aureus bacteremia)      Situations of FALSE-POSITIVE Procalcitonin values:  1) Newborns < 67 hours old  2) Massive stress from severe trauma / burns, major surgery, acute pancreatitis, cardiogenic / hemorrhagic shock, sickle cell crisis, or other organ perfusion abnormalities  3) Malaria and some Candidal infections  4) Treatment with agents that stimulate cytokines (e g , OKT3, anti-lymphocyte globulins, alemtuzumab, IL-2, granulocyte transfusion [NOT GCSFs])  5) Chronic renal disease causes elevated baseline levels (consider GREATER than 0 75 ng/mL as an abnormal cut-off); initiating HD/CRRT may cause transient decreases  6) Paraneoplastic syndromes from medullary thyroid or SCLC, some forms of vasculitis, and acute [id number] disease    Situations of FALSE-NEGATIVE Procalcitonin values:  1) Too early in clinical course for PCT to have reached its peak (may repeat in 6-24 hours to confirm low level)  2) Localized infection WITHOUT systemic (SIRS / sepsis) response (e g , an abscess, osteomyelitis, cystitis)  3) Mycobacteria (e g , Tuberculosis, MAC)  4) Cystic fibrosis exacerbations     UA W REFLEX TO MICROSCOPIC WITH REFLEX TO CULTURE - Abnormal    Color, UA Yellow   Final    Clarity, UA Clear   Final    Specific Gravity, UA 1 015  1 005 - 1 030 Final    pH, UA 6 0  4 5, 5 0, 5 5, 6 0, 6 5, 7 0, 7 5, 8 0 Final    Leukocytes, UA Negative  Negative Final    Nitrite, UA Negative  Negative Final    Protein,  (2+) (*) Negative mg/dl Final    Glucose,  (3/20%) (*) Negative mg/dl Final    Ketones, UA 10 (1+) (*) Negative mg/dl Final    Urobilinogen, UA <2 0  <2 0 mg/dl mg/dl Final    Bilirubin, UA Negative  Negative Final    Occult Blood, UA Small (*) Negative Final   COVID19, INFLUENZA A/B, RSV PCR, [facility] - Normal    SARS-CoV-2 Negative  Negative Final    INFLUENZA A PCR Negative  Negative Final    INFLUENZA B PCR Negative  Negative Final    RSV PCR Negative  Negative Final    Narrative:     FOR PEDIATRIC PATIENTS - copy/paste COVID Guidelines URL to browser: [email or web address]  [employer]    SARS-CoV-2 assay is a Nucleic Acid Amplification assay intended for the  qualitative detection of nucleic acid from SARS-CoV-2 in nasopharyngeal  swabs  Results are for the presumptive identification of SARS-CoV-2 RNA  Positive results are indicative of infection with SARS-CoV-2, the virus  causing COVID-19, but do not rule out bacterial infection or co-infection  with other viruses  Laboratories within the [place] and its  territories are required to report all positive results to the appropriate  public health authorities  Negative results do not preclude SARS-CoV-2  infection and should not be used as the sole basis for treatment or other  patient management decisions  Negative results must be combined with  clinical observations, patient history, and epidemiological information  This test has not been FDA cleared or approved  This test has been authorized by FDA under an Emergency Use Authorization  (EUA)  This test is only authorized for the duration of time the  declaration that circumstances exist justifying the authorization of the  emergency use of an in vitro diagnostic tests for detection of SARS-CoV-2  virus and/or diagnosis of COVID-19 infection under section 564(b)(1) of  the Act, 21 U  S C  094DTC-6(L)(2), unless the authorization is terminated  or revoked sooner  The test has been validated but independent review by FDA  and CLIA is pending  Test performed using [employer] Gene[employer]pert: This RT-PCR assay targets N2,  a region unique to SARS-CoV-2  A conserved region in the E-gene was chosen  for pan-Sarbecovirus detection which includes SARS-CoV-2  According to CMS-2020-01-R, this platform meets the definition of high-throughput technology  LACTIC ACID, PLASMA - Normal    LACTIC ACID 1 1  0 5 - 2 0 mmol/L Final    Narrative:     Result may be elevated if tourniquet was used during collection     PROTIME-INR - Normal    Protime 13 9  11 6 - 14 5 seconds Final    INR 1 00  0 84 - 1 19 Final   APTT - Normal    PTT 29  23 - 37 seconds Final    Comment: Therapeutic Heparin Range =  60-90 seconds   URINE MICROSCOPIC - Normal    RBC, UA 2-4  None Seen, 0-1, 1-2, 2-4, 0-5 /hpf Final    WBC, UA 1-2  None Seen, 0-1, 1-2, 0-5, 2-4 /hpf Final    Epithelial Cells Occasional  None Seen, Occasional /hpf Final    Bacteria, UA None Seen  None Seen, Occasional /hpf Final   BLOOD CULTURE   BLOOD CULTURE          ED Course / Workup Pending (followup):                                    ED Course as of 11/03/22 0220   u Nov 03, 2022   0101 S/o from Dr Melany Mckinney  CT a/p pend, then admit to medicine for generalized fatigue, SIRS criteria  No clear source  GCS 15, oriented per report, with no focal deficits  7197 Discussed w/ rads: Dr Gill Herring  Pt with mass in LLL, possible lung CA, lytic lesions in pelvis with likely mets  Small pericardial effusion  4177 Discussed w/ Cheng Fat  Will discuss with patient once formal reading is resulted  0404 Pt updated  Discussed likely metastatic cancer, and that he will be admitted  All questions answered  I asked if he wanted me to call anyone, he declined       Procedures  MDM  Number of Diagnoses or Management Options  Generalized weakness: new and requires workup  Leukocytosis: new and requires workup  Metastatic cancer [facility]): new and requires workup     Amount and/or Complexity of Data Reviewed  Clinical lab tests: reviewed  Tests in the radiology section of CPT®: reviewed  Tests in the medicine section of CPT®: reviewed  Discussion of test results with the performing providers: yes  Review and summarize past medical records: yes  Discuss the patient with other providers: yes    Risk of Complications, Morbidity, and/or Mortality  Presenting problems: moderate  Diagnostic procedures: low  Management options: moderate    Patient Progress  Patient progress: stable          Disposition  Final diagnoses:   Metastatic cancer ([facility] [address] )   Generalized weakness   Leukocytosis     Time reflects when diagnosis was documented in both MDM as applicable and the Disposition within this note     Time User Action Codes Description Comment    11/3/2022  2:02 AM Carmella Self S Add [C79 9] Metastatic cancer ([facility] [address] )     11/3/2022  2:02 AM Carmella Self S Add [R53 1] Generalized weakness     11/3/2022  2:02 AM Denzel Arteaga Add [Q26 733] Leukocytosis       ED Disposition     ED Disposition   Admit    Condition   Stable    Date/Time   Thu Nov 3, 2022  2:02 AM    Comment   Case was discussed with Keisha Roberto and the patient's admission status was agreed to be Admission Status: inpatient status to the service of Dr Wlilard Case   Follow-up Information    None       Patient's Medications   Discharge Prescriptions    No medications on file     No discharge procedures on file         ED Provider  Electronically Signed by     Eyad Dykes MD  11/03/22 0812

## 2022-11-03 NOTE — ASSESSMENT & PLAN NOTE
· Presents with generalized weakness, fatigue, and weight loss  · CT a/P: " 2 5 x 2 2 x 2 2 cm mass in the left lower lobe concerning for lung cancer   Lytic lesions within the left 6th rib and pelvis bones compatible with osseous metastasis "  · Longstanding smoking history  · Heme-Onc consult

## 2022-11-03 NOTE — ASSESSMENT & PLAN NOTE
· Distended gallbladder on CT scan   · Obtain right upper quadrant ultrasound patient also with elevated T bili and alk-phos  · Continue Zosyn for now

## 2022-11-03 NOTE — ASSESSMENT & PLAN NOTE
· B/l pedal edema (R>L) for a couple of days   · Due to probable newly diagnosed metastatic cancer on CT scan, will obtain venous duplex

## 2022-11-03 NOTE — CONSULTS
Medical Oncology/Hematology Consult Note  Alex Rosales, male, 58 y  o , 1960,  /-01, [id number]     Reason for consultation: lung mass, bone lesions  LOS: 2 days  Presented with lower extremity weakness    Assessment and Plan:  1  Lung mass  2  Bone lesions  -CT of abdomen and pelvis  (11/3/22) shows 2 5 x 2 2 x 2 2 cm mass in the left lower lobe concerning for lung cancer as well as lytic lesions within the left 6th rib and pelvic bones compatible with osseous metastasis  Small left pleural effusion, small anterior pericardial effusion, also enlarged prostate  -CT of chest w contrast (11/3/22) showed a few small subsegmental emboli in the right middle lobe, right lower lobe, and inferior lingula  3 6 cm thick walled cavitary right upper lobe tumor and 2 8 cm necrotic left lower lobe tumor  Ill-defined infiltration of the mediastinal fat likely due to tumor with enlarged left hilar node, likely metastatic    -Metastatic disease to bilateral ribs and T6 and T8 with severe compression deformities  Given mild retropulsion of bone into the spinal canal at the level of T8, recommend further evaluation with MRI to evaluate for cord compression  3  Normocytic normochromic anemia  - Hgb normal at 12 1 on admission (2 days ago)  hgb at 8 9 g/dL  Most likely iatrogenic anemia d/t hospitalization  Iron panel: 43% iron saturation and 1,189 ferritin most likely d/t inflammation/ presentation of SIRS     4  Leukocytosis  - Presented with SIRS, currently on abx; could be secondary to infection or reactive from malignancy  -WBC Trend: 17 51 (11/4) <--20 09 (11/3) <--(17 36 (11/2)  Improving    -Differential: 12 15 absolute neutrophils    5  Hyponatremia  - Consider differential of SIADH secondary to malignancy; primary team correcting slowly  -Sodium trend: 133 (today) <-132 <-129     6  Hypercalcemia  -Corrected calcium of 10 5, improved form 11 5 mg/dL  -Improved with hydration   Can consider bisphosphonate in future if recurs, or this can be initiated in clinic for bone metastases once malignancy is confirmed    7  Pulmonary Embolism  -CTA Chest (11/3/22) -A few small subsegmental emboli in the right middle lobe, right lower lobe, and inferior lingula  -Patient currently on heparin drip, most likely will remain on this therapy for possible procedures    PLAN:  1) Recommend IR consult to assist with obtaining tissue; plan to submit for molecular testing as well    2) Recommend MRI of brain to complete staging for this likely lung cancer primary    3) Patient presented with leg weakness with right and lower back pain; imaging showed lytic lesions seen within the L4 vertebral body along with mild retropulsion of bone into the spinal canal at the level of T8  Recommend MRI of lumbar spine  MRI ordered on 11/3/22 in the late afternoon  Discussed with radiology and hospitalist on 11/4/22  4) Continue trend CBC daily  Leukocytosis improving, hemoglobin stable  Platelets WNL  Presence of enlarged prostate as well, will order PSA for completeness    5) Patient currently on heparin drip for his pulmonary embolism, recommend transitioning to [id number] upon discharge  PE most likely d/t malignancy  Patient will be set up for outpatient follow up as outlined below  6) Per neuropsych evaluation: does not appear to have capacity to make fully informed medical decisions  This assessment may possibly impact his treatment plan once malignancy hs been confirmed  Patient reported that he lives alone and does not have any family members nearby  Appreciate  and case management's assistance in determining outpatient plan  7) Outpatient follow up plan: Pt will need follow up with Oncology in [facility] office in 10-14 days to discuss treatment options  Will be set up by inpatient Cancer Coordinator, TRINITY Garcia  Molecular testing to be submitted once patient has been discharged      Communication with team:  Communicated primary team  Reviewed patient with Dr Eli Gayle  Thank you for this consult  Srinivasa Loyola, [address], [place]  Hematology-Oncology      History of present illness:  Vinicio Segura is a 58 y o  male past medical history of schizophrenia presented with generalized weakness and significant fatigue  He began workup for sepsis  He tells the team that his legs continue to give out causing difficulty with walking  He tells the team that he lost over 25 lb the last 2 months unintentionally  He also complained of left lower back pain  CT imaging of his abdomen and pelvis  (11/3/22) showed 2 5 x 2 2 x 2 2 cm mass in the left lower lobe concerning for lung cancer as well as lytic lesions within the left 6th rib and pelvic bones compatible with osseous metastasis  Small left pleural effusion, small anterior pericardial effusion, also enlarged prostate  Follow up imaging, CT of chest w contrast (11/3/22) showed a few small subsegmental emboli in the right middle lobe, right lower lobe, and inferior lingula  3 6 cm thick walled cavitary right upper lobe tumor and 2 8 cm necrotic left lower lobe tumor  Ill-defined infiltration of the mediastinal fat likely due to tumor with enlarged left hilar node, likely metastatic  Metastatic disease to bilateral ribs and T6 and T8 with severe compression deformities  Radiology expressed that given mild retropulsion of bone into the spinal canal at the level of T8, recommend further evaluation with MRI to evaluate for cord compression  MRI of the spine was already ordered then  Oncology was consulted to provide recommendations for suspicion of malignancy  Review of Systems:   Review of Systems   Constitutional: Positive for fatigue  Negative for chills and fever  HENT: Negative for ear pain and sore throat  Eyes: Negative for pain and visual disturbance  Respiratory: Negative for cough and shortness of breath      Cardiovascular: Negative for chest pain and palpitations  Gastrointestinal: Negative for abdominal pain and vomiting  Genitourinary: Negative for dysuria and hematuria  Musculoskeletal: Negative for arthralgias and back pain  Skin: Negative for color change and rash  Neurological: Positive for weakness  Negative for seizures and syncope  Psychiatric/Behavioral: The patient is not nervous/anxious  All other systems reviewed and are negative  Oncology History:   Cancer Staging  No matching staging information was found for the patient  Oncology History    No history exists  Past Medical History:   Diagnosis Date   • Bladder infection        History reviewed  No pertinent surgical history  History reviewed  No pertinent family history  Social History     Socioeconomic History   • Marital status: Single     Spouse name: None   • Number of children: None   • Years of education: None   • Highest education level: None   Occupational History   • None   Tobacco Use   • Smoking status: Current Every Day Smoker     Packs/day: 1 00     Types: Cigarettes   • Smokeless tobacco: Never Used   Vaping Use   • Vaping Use: Never used   Substance and Sexual Activity   • Alcohol use: Not Currently   • Drug use: Never   • Sexual activity: None   Other Topics Concern   • None   Social History Narrative   • None     Social Determinants of Health     Financial Resource Strain: Not on file   Food Insecurity: No Food Insecurity   • Worried About Running Out of Food in the Last Year: Never true   • Ran Out of Food in the Last Year: Never true   Transportation Needs: No Transportation Needs   • Lack of Transportation (Medical): No   • Lack of Transportation (Non-Medical):  No   Physical Activity: Not on file   Stress: Not on file   Social Connections: Not on file   Intimate Partner Violence: Not on file   Housing Stability: Low Risk    • Unable to Pay for Housing in the Last Year: No   • Number of Places Lived in the Last Year: 1   • Unstable Housing in the Last Year: No         Current Facility-Administered Medications:   •  acetaminophen (TYLENOL) tablet 650 mg, 650 mg, Oral, Q8H PRN, Isabel Chen PA-C, 650 mg at 11/03/22 0302  •  heparin (porcine) 25,000 units in 0 45% NaCl 250 mL infusion (premix), 3-30 Units/kg/hr (Order-Specific), Intravenous, Titrated, Aaron Hennessy MD, Last Rate: 12 6 mL/hr at 11/03/22 1242, 18 Units/kg/hr at 11/03/22 1242  •  influenza vaccine, recombinant, quadrivalent (FLUBLOK) IM injection 0 5 mL, 0 5 mL, Intramuscular, Prior to discharge, Isabel Chen PA-C  •  insulin glargine (LANTUS) subcutaneous injection 6 Units 0 06 mL, 6 Units, Subcutaneous, HS, Haleigh Ceron MD  •  insulin lispro (HumaLOG) 100 units/mL subcutaneous injection 1-5 Units, 1-5 Units, Subcutaneous, TID AC, 4 Units at 11/03/22 0824 **AND** Fingerstick Glucose (POCT), , , TID AC, Isabel Chen PA-C  •  insulin lispro (HumaLOG) 100 units/mL subcutaneous injection 1-5 Units, 1-5 Units, Subcutaneous, HS, Isabel Chen PA-C  •  insulin lispro (HumaLOG) 100 units/mL subcutaneous injection 4 Units, 4 Units, Subcutaneous, TID With Meals, Aaron Hennessy MD, 4 Units at 11/03/22 1238  •  lidocaine (LIDODERM) 5 % patch 1 patch, 1 patch, Topical, Daily, Aaron Hennessy MD, 1 patch at 11/03/22 1157  •  morphine injection 2 mg, 2 mg, Intravenous, Q4H PRN, Aaron Hennessy MD, 2 mg at 11/03/22 1310  •  multi-electrolyte (PLASMALYTE-A/ISOLYTE-S PH 7 4) IV solution, 125 mL/hr, Intravenous, Continuous, Isabel Chen PA-C, Last Rate: 125 mL/hr at 11/03/22 1157, 125 mL/hr at 11/03/22 1157  •  nicotine (NICODERM CQ) 21 mg/24 hr TD 24 hr patch 1 patch, 1 patch, Transdermal, Daily, Isabel Chen PA-C, 1 patch at 11/03/22 0818  •  ondansetron (ZOFRAN) injection 4 mg, 4 mg, Intravenous, Q6H PRN, Isabel Chen PA-C  •  piperacillin-tazobactam (ZOSYN) IVPB 3 375 g, 3 375 g, Intravenous, Q6H, Donel Pu, PA-C, 3 375 g at 11/03/22 1036  •  senna (SENOKOT) tablet 8 6 mg, 1 tablet, Oral, HS PRN, Donel Pu, PA-C    Medications Prior to Admission   Medication   • cloZAPine (CLOZARIL) 100 mg tablet       No Known Allergies      Physical Exam:     /65 (BP Location: Left arm)   Pulse (!) 106   Temp 97 8 °F (36 6 °C) (Oral)   Resp 20   Ht 5' 9" (1 753 m)   Wt 72 6 kg (160 lb)   SpO2 95%   BMI 23 63 kg/m²     Physical Exam  Cardiovascular:      Rate and Rhythm: Tachycardia present  Pulses: Normal pulses  Pulmonary:      Effort: Pulmonary effort is normal    Abdominal:      General: Bowel sounds are normal       Palpations: Abdomen is soft  Skin:     General: Skin is warm and dry  Neurological:      Mental Status: He is alert  Motor: Weakness present     Psychiatric:         Mood and Affect: Mood normal       Comments: Flight of ideas during conversation         Recent Results (from the past 50 hour(s))   ECG 12 lead    Collection Time: 11/02/22  9:19 PM   Result Value Ref Range    Ventricular Rate 114 BPM    Atrial Rate 114 BPM    NE Interval 132 ms    QRSD Interval 108 ms    QT Interval 316 ms    QTC Interval 435 ms    P Axis 70 degrees    QRS Axis 78 degrees    T Wave Axis 72 degrees   CBC and differential    Collection Time: 11/02/22 11:49 PM   Result Value Ref Range    WBC 17 36 (H) 4 31 - 10 16 Thousand/uL    RBC 3 90 3 88 - 5 62 Million/uL    Hemoglobin 12 1 12 0 - 17 0 g/dL    Hematocrit 35 3 (L) 36 5 - 49 3 %    MCV 91 82 - 98 fL    MCH 31 0 26 8 - 34 3 pg    MCHC 34 3 31 4 - 37 4 g/dL    RDW 15 5 (H) 11 6 - 15 1 %    MPV 9 6 8 9 - 12 7 fL    Platelets 375 286 - 892 Thousands/uL    nRBC 0 /100 WBCs    Neutrophils Relative 73 43 - 75 %    Immat GRANS % 2 0 - 2 %    Lymphocytes Relative 19 14 - 44 %    Monocytes Relative 5 4 - 12 %    Eosinophils Relative 1 0 - 6 %    Basophils Relative 0 0 - 1 %    Neutrophils Absolute 12 72 (H) 1 85 - 7 62 Thousands/µL    Immature Grans Absolute 0 26 (H) 0 00 - 0 20 Thousand/uL    Lymphocytes Absolute 3 35 0 60 - 4 47 Thousands/µL    Monocytes Absolute 0 91 0 17 - 1 22 Thousand/µL    Eosinophils Absolute 0 08 0 00 - 0 61 Thousand/µL    Basophils Absolute 0 04 0 00 - 0 10 Thousands/µL   Comprehensive metabolic panel    Collection Time: 11/02/22 11:49 PM   Result Value Ref Range    Sodium 129 (L) 135 - 147 mmol/L    Potassium 3 7 3 5 - 5 3 mmol/L    Chloride 91 (L) 96 - 108 mmol/L    CO2 32 21 - 32 mmol/L    ANION GAP 6 4 - 13 mmol/L    BUN 23 5 - 25 mg/dL    Creatinine 0 78 0 60 - 1 30 mg/dL    Glucose 233 (H) 65 - 140 mg/dL    Calcium 10 6 (H) 8 3 - 10 1 mg/dL    Corrected Calcium 11 5 (H) 8 3 - 10 1 mg/dL    AST 17 5 - 45 U/L    ALT 31 12 - 78 U/L    Alkaline Phosphatase 132 (H) 46 - 116 U/L    Total Protein 7 6 6 4 - 8 4 g/dL    Albumin 2 9 (L) 3 5 - 5 0 g/dL    Total Bilirubin 1 10 (H) 0 20 - 1 00 mg/dL    eGFR 96 ml/min/1 73sq m   Lactic acid    Collection Time: 11/02/22 11:49 PM   Result Value Ref Range    LACTIC ACID 1 1 0 5 - 2 0 mmol/L   Procalcitonin    Collection Time: 11/02/22 11:49 PM   Result Value Ref Range    Procalcitonin 11 67 (H) <=0 25 ng/ml   Protime-INR    Collection Time: 11/02/22 11:49 PM   Result Value Ref Range    Protime 13 9 11 6 - 14 5 seconds    INR 1 00 0 84 - 1 19   APTT    Collection Time: 11/02/22 11:49 PM   Result Value Ref Range    PTT 29 23 - 37 seconds   Blood culture #1    Collection Time: 11/02/22 11:49 PM    Specimen: Arm, Left; Blood   Result Value Ref Range    Blood Culture Received in Microbiology Lab  Culture in Progress  Blood culture #2    Collection Time: 11/02/22 11:49 PM    Specimen: Arm, Right; Blood   Result Value Ref Range    Blood Culture Received in Microbiology Lab  Culture in Progress      FLU/RSV/COVID - if FLU/RSV clinically relevant    Collection Time: 11/02/22 11:49 PM    Specimen: Nose; Nares   Result Value Ref Range    SARS-CoV-2 Negative Negative INFLUENZA A PCR Negative Negative    INFLUENZA B PCR Negative Negative    RSV PCR Negative Negative   Hemoglobin A1C w/ EAG Estimation    Collection Time: 11/02/22 11:49 PM   Result Value Ref Range    Hemoglobin A1C 9 1 (H) Normal 3 8-5 6%; PreDiabetic 5 7-6 4%;  Diabetic >=6 5%; Glycemic control for adults with diabetes <7 0% %     mg/dl   UA w Reflex to Microscopic w Reflex to Culture    Collection Time: 11/03/22 12:45 AM    Specimen: Urine, Clean Catch   Result Value Ref Range    Color, UA Yellow     Clarity, UA Clear     Specific Gravity, UA 1 015 1 005 - 1 030    pH, UA 6 0 4 5, 5 0, 5 5, 6 0, 6 5, 7 0, 7 5, 8 0    Leukocytes, UA Negative Negative    Nitrite, UA Negative Negative    Protein,  (2+) (A) Negative mg/dl    Glucose,  (3/20%) (A) Negative mg/dl    Ketones, UA 10 (1+) (A) Negative mg/dl    Urobilinogen, UA <2 0 <2 0 mg/dl mg/dl    Bilirubin, UA Negative Negative    Occult Blood, UA Small (A) Negative   Urine Microscopic    Collection Time: 11/03/22 12:45 AM   Result Value Ref Range    RBC, UA 2-4 None Seen, 0-1, 1-2, 2-4, 0-5 /hpf    WBC, UA 1-2 None Seen, 0-1, 1-2, 0-5, 2-4 /hpf    Epithelial Cells Occasional None Seen, Occasional /hpf    Bacteria, UA None Seen None Seen, Occasional /hpf   CBC and differential    Collection Time: 11/03/22  4:42 AM   Result Value Ref Range    WBC 15 50 (H) 4 31 - 10 16 Thousand/uL    RBC 2 99 (L) 3 88 - 5 62 Million/uL    Hemoglobin 9 2 (L) 12 0 - 17 0 g/dL    Hematocrit 27 1 (L) 36 5 - 49 3 %    MCV 91 82 - 98 fL    MCH 30 8 26 8 - 34 3 pg    MCHC 33 9 31 4 - 37 4 g/dL    RDW 15 4 (H) 11 6 - 15 1 %    MPV 9 6 8 9 - 12 7 fL    Platelets 509 859 - 335 Thousands/uL    nRBC 0 /100 WBCs    Neutrophils Relative 79 (H) 43 - 75 %    Immat GRANS % 1 0 - 2 %    Lymphocytes Relative 14 14 - 44 %    Monocytes Relative 6 4 - 12 %    Eosinophils Relative 0 0 - 6 %    Basophils Relative 0 0 - 1 %    Neutrophils Absolute 12 15 (H) 1 85 - 7 62 Thousands/µL Immature Grans Absolute 0 21 (H) 0 00 - 0 20 Thousand/uL    Lymphocytes Absolute 2 11 0 60 - 4 47 Thousands/µL    Monocytes Absolute 0 94 0 17 - 1 22 Thousand/µL    Eosinophils Absolute 0 06 0 00 - 0 61 Thousand/µL    Basophils Absolute 0 03 0 00 - 0 10 Thousands/µL   Comprehensive metabolic panel    Collection Time: 11/03/22  4:42 AM   Result Value Ref Range    Sodium 132 (L) 135 - 147 mmol/L    Potassium 3 2 (L) 3 5 - 5 3 mmol/L    Chloride 97 96 - 108 mmol/L    CO2 32 21 - 32 mmol/L    ANION GAP 3 (L) 4 - 13 mmol/L    BUN 19 5 - 25 mg/dL    Creatinine 0 71 0 60 - 1 30 mg/dL    Glucose 287 (H) 65 - 140 mg/dL    Calcium 8 8 8 3 - 10 1 mg/dL    Corrected Calcium 10 3 (H) 8 3 - 10 1 mg/dL    AST 16 5 - 45 U/L    ALT 22 12 - 78 U/L    Alkaline Phosphatase 94 46 - 116 U/L    Total Protein 5 5 (L) 6 4 - 8 4 g/dL    Albumin 2 1 (L) 3 5 - 5 0 g/dL    Total Bilirubin 0 80 0 20 - 1 00 mg/dL    eGFR 100 ml/min/1 73sq m   Protime-INR    Collection Time: 11/03/22  4:42 AM   Result Value Ref Range    Protime 14 8 (H) 11 6 - 14 5 seconds    INR 1 08 0 84 - 1 19   Magnesium    Collection Time: 11/03/22  4:42 AM   Result Value Ref Range    Magnesium 1 5 (L) 1 6 - 2 6 mg/dL   Phosphorus    Collection Time: 11/03/22  4:42 AM   Result Value Ref Range    Phosphorus 3 3 2 3 - 4 1 mg/dL   Procalcitonin    Collection Time: 11/03/22  4:42 AM   Result Value Ref Range    Procalcitonin 10 74 (H) <=0 25 ng/ml   Calcium, ionized    Collection Time: 11/03/22  4:42 AM   Result Value Ref Range    Calcium, Ionized 1 18 1 12 - 1 32 mmol/L   Fingerstick Glucose (POCT)    Collection Time: 11/03/22  7:06 AM   Result Value Ref Range    POC Glucose 333 (H) 65 - 140 mg/dl   Fingerstick Glucose (POCT)    Collection Time: 11/03/22 10:55 AM   Result Value Ref Range    POC Glucose 122 65 - 140 mg/dl   APTT    Collection Time: 11/03/22 12:34 PM   Result Value Ref Range    PTT 34 23 - 37 seconds   CBC    Collection Time: 11/03/22 12:34 PM   Result Value Ref Range    WBC 20 09 (H) 4 31 - 10 16 Thousand/uL    RBC 3 09 (L) 3 88 - 5 62 Million/uL    Hemoglobin 9 5 (L) 12 0 - 17 0 g/dL    Hematocrit 28 0 (L) 36 5 - 49 3 %    MCV 91 82 - 98 fL    MCH 30 7 26 8 - 34 3 pg    MCHC 33 9 31 4 - 37 4 g/dL    RDW 15 7 (H) 11 6 - 15 1 %    Platelets 473 076 - 052 Thousands/uL    MPV 9 4 8 9 - 12 7 fL   Protime-INR    Collection Time: 11/03/22 12:34 PM   Result Value Ref Range    Protime 14 8 (H) 11 6 - 14 5 seconds    INR 1 08 0 84 - 1 19       XR chest portable    Result Date: 11/3/2022  Narrative: CHEST INDICATION:   Cough, tachycardia, weakness  COMPARISON:  Abdomen CT 11/3/2022, CXR 3/20/2006  EXAM PERFORMED/VIEWS:  XR CHEST PORTABLE FINDINGS: Cardiomediastinal silhouette appears unremarkable  2 6 cm solid left lower lobe mass corresponding with the abdomen CT   4 cm cavitary right upper lobe mass  No acute disease  No effusion or pneumothorax  Left lateral 6th rib metastasis corresponding with the abdomen CT probable posterior left 5th rib metastasis  Impression: Right upper lobe cavitary mass, left lower lobe mass, and left rib metastases, some which are visible on the abdomen CT  Findings on the abdomen CT were reported to the emergency physician by Dr Elias Cristina  Workstation performed: [id number]     VAS lower limb venous duplex study, complete bilateral    Result Date: 11/3/2022  Narrative:  [facility] REPORT CLINICAL: Indications: Patient presents with bilateral pedal edema x  1 week  FINDINGS:  Segment        Right                   Left                          Impression              Impression              Gastrocnemius  Occlusive Subsegmental  Occlusive Subsegmental  Calf Veins     Occlusive Subsegmental                             CONCLUSION:  Impression: RIGHT LOWER LIMB: Evaluation shows acute occlusive thrombus in the gastrocnemius veins from behind the knee to proximal calf and in the soleal vein from proximal to mid calf   No evidence of superficial thrombophlebitis noted  Doppler evaluation shows a normal response to augmentation maneuvers  Popliteal, posterior tibial and anterior tibial arterial Doppler waveforms are triphasic  LEFT LOWER LIMB: Evaluation shows acute occlusive thrombus in the gastrocnemius veins from behind the knee to proximal calf  No evidence of superficial thrombophlebitis noted  Doppler evaluation shows a normal response to augmentation maneuvers  Popliteal, posterior tibial and anterior tibial arterial Doppler waveforms are triphasic  Technical findings were given to GALEN Knox on 11/3/2022 @ 1045  SIGNATURE: Electronically Signed by: Ildefonso Umanzor on 2022-11-03 02:12:15 PM    CT abdomen pelvis with contrast    Result Date: 11/3/2022  Narrative: CT ABDOMEN AND PELVIS WITH IV CONTRAST INDICATION:   Sepsis Sepsis rule out infection  Poor historian  COMPARISON:  CT of abdomen pelvis on March 20, 2006  TECHNIQUE:  CT examination of the abdomen and pelvis was performed  Axial, sagittal, and coronal 2D reformatted images were created from the source data and submitted for interpretation  Radiation dose length product (DLP) for this visit:  487 6 mGy-cm   This examination, like all CT scans performed in the [facility], was performed utilizing techniques to minimize radiation dose exposure, including the use of iterative reconstruction and automated exposure control  IV Contrast:  100 mL of iohexol (OMNIPAQUE) Enteric Contrast:  Enteric contrast was not administered  FINDINGS: ABDOMEN LOWER CHEST:  2 5 x 2 2 x 2 2 cm mass in the left lower lobe (series 2, image 6) concerning for malignancy  There is a small left pleural effusion with adjacent atelectasis  There is a small anterior pericardial effusion  Coronary artery calcifications  LIVER/BILIARY TREE:  Unremarkable  GALLBLADDER:  Distended  No calcified gallstones  No pericholecystic inflammatory change  SPLEEN:  Unremarkable   PANCREAS: Unremarkable  ADRENAL GLANDS:  Unremarkable  KIDNEYS/URETERS:  There are small wedge-shaped hypodensities measuring up to 1 3 cm within the lower pole of the right kidney and the upper pole of the left kidney which may be due to scarring versus small renal infarctions  No hydronephrosis  STOMACH AND BOWEL:  Unremarkable  APPENDIX:  No findings to suggest appendicitis  ABDOMINOPELVIC CAVITY:  No ascites  No pneumoperitoneum  No lymphadenopathy  VESSELS:  Moderate atherosclerotic disease of the abdominal aorta  There is narrowing of the distal abdominal aorta, the lumen measuring up to 8 mm in diameter just prior to the bifurcation  PELVIS REPRODUCTIVE ORGANS:  Enlarged prostate  URINARY BLADDER:  Unremarkable  ABDOMINAL WALL/INGUINAL REGIONS:  Unremarkable  OSSEOUS STRUCTURES:  There are lytic lesions compatible with metastasis  For example, there is a lytic lesion measuring 1 9 cm in the left iliac bone (series 2, image 57)  Additional smaller lytic lesions are seen in the left iliac bone (series 2, image 53)  A small lytic lesion seen within the left sacral ala  2 6 x 1 9 cm lytic expansile lesion in the left lateral 6th rib  Possible vague lytic lesions are seen within the L4 vertebral body  Impression: 1   2 5 x 2 2 x 2 2 cm mass in the left lower lobe concerning for lung cancer  2   Lytic lesions within the left 6th rib and pelvis bones compatible with osseous metastasis  3   2   Small left pleural effusion  4   3   Small anterior pericardial effusion  5   Small wedge-shaped hypodensities within bilateral kidneys may reflect scarring versus small renal infarcts  6   Other findings as above  I personally discussed this study with Dr Prince Fall on 11/3/2022 at 1:54 AM  Workstation performed: [id number]       Labs and pertinent reports reviewed  This note has been generated by voice recognition software system  Therefore, there may be spelling, grammar, and or syntax errors   Please contact if questions arise

## 2022-11-03 NOTE — CASE MANAGEMENT
Case Management Assessment & Discharge Planning Note    Patient name Quinn Tee  Location [address] [id number] MRN [id number]  : 1960 Date 11/3/2022       Current Admission Date: 2022  Current Admission Diagnosis:Suspected Primary malignant neoplasm of left lung metastatic to other site [facility])   Patient Active Problem List    Diagnosis Date Noted   • SIRS (systemic inflammatory response syndrome) ([facility] [address] ) 2022   • Suspected Primary malignant neoplasm of left lung metastatic to other site [facility]) 2022   • Abnormal CT scan, gallbladder 2022   • Schizophrenia ([facility] [address] ) 2022   • Hypercalcemia 2022   • Elevated glucose 2022   • Hyponatremia 2022   • Pedal edema 2022      LOS (days): 0  Geometric Mean LOS (GMLOS) (days): 2 10  Days to GMLOS:1 5     OBJECTIVE:    Risk of Unplanned Readmission Score: 16 85         Current admission status: Inpatient       Preferred Pharmacy:   [address] [place] [address], [address] [place] [id number]  Phone: [phone] Fax: [phone]    Primary Care Provider: No primary care provider on file      Primary Insurance: MEDICARE  Secondary Insurance: [facility]    ASSESSMENT:  Active Health Care Proxies     [facility] [address], [address] [facility] Phone: [phone] (Home)               Advance Directives  Does patient have a [address]?: No  Was patient offered paperwork?: Yes  Does patient currently have a Health Care decision maker?: Yes, please see Health Care Proxy section  Does patient have Advance Directives?: No  Was patient offered paperwork?: Yes         Readmission Root Cause  30 Day Readmission: No    Patient Information  Admitted from[de-identified] Home  Mental Status: Alert  During Assessment patient was accompanied by: Not accompanied during assessment  Assessment information provided by[de-identified] Parent  Primary Caregiver: Self  Support Systems: Friend  Home entry access options   Select all that apply : No steps to enter home  Type of Current Residence: Apartment  Floor Level: 1  Upon entering residence, is there a bedroom on the main floor (no further steps)?: Yes  In the last 12 months, was there a time when you were not able to pay the mortgage or rent on time?: No  In the last 12 months, how many places have you lived?: 1  In the last 12 months, was there a time when you did not have a steady place to sleep or slept in a shelter (including now)?: No  Homeless/housing insecurity resource given?: N/A  Living Arrangements: Lives Alone    Activities of Daily Living Prior to Admission  Functional Status: Independent  Completes ADLs independently?: Yes  Ambulates independently?: Yes  Does patient use assisted devices?: No  Does patient currently own DME?: No  Does patient have a history of Outpatient Therapy (PT/OT)?: No  Does the patient have a history of Short-Term Rehab?: No  Does patient have a history of HHC?: No  Does patient currently have COFCO ?: No         Patient Information Continued  Does patient have prescription coverage?: Yes  Within the past 12 months, you worried that your food would run out before you got the money to buy more : Never true  Within the past 12 months, the food you bought just didn't last and you didn't have money to get more : Never true  Food insecurity resource given?: N/A  Does patient receive dialysis treatments?: No  Does patient have a history of substance abuse?: No  Does patient have a history of Mental Health Diagnosis?: No  Is patient receiving treatment for mental health?: Yes ([address] [place] [address])         Means of Transportation  In the past 12 months, has lack of transportation kept you from medical appointments or from getting medications?: No  In the past 12 months, has lack of transportation kept you from meetings, work, or from getting things needed for daily living?: No  Was application for public transport provided?: N/A    DISCHARGE DETAILS:    Discharge planning discussed with[de-identified] patient  Freedom of Choice: Yes  Comments - Freedom of Choice: Pt is requesting [place] [address] services and either a RW or WC  CM contacted family/caregiver?: No- see comments (declined; reports being in contact with his friend)  Were Treatment Team discharge recommendations reviewed with patient/caregiver?: Yes  Did patient/caregiver verbalize understanding of patient care needs?: Yes  Were patient/caregiver advised of the risks associated with not following Treatment Team discharge recommendations?: Yes    [facility]         Is the patient interested in [employer] at discharge?: Yes  [address] requested[de-identified] Nursing, Physical Therapy, Occupational [facility] Provider[de-identified] PCP  Home Health Services Needed[de-identified] Evaluate Functional Status and Safety, Gait/ADL Training, Strengthening/Theraputic Exercises to Improve Function  Homebound Criteria Met[de-identified] Requires the Assistance of Another Person for Safe Ambulation or to Leave the Home  Supporting Clincal Findings[de-identified] Limited Endurance    Other Referral/Resources/Interventions Provided:  Interventions: HHC  Referral Comments: Referral to [facility]    Additional Comments: Met with pt to discuss the role of CM and to discuss any help pt may need prior to dc  Pt lives alone in a 1st floor aparement with no KALEE  Pt performed ADL's indptly pta, no use of DME  No hx of HHC or rehab  Pt is treated for his mental health through [facility] for his schizophrenia  Pt is requesting [place] [address] services and does not have a preference is [place] [address] agency; multiple [place] [address] referrals sent  Pt is also requesting either a RW or WC  PT/OT eval pending to determine which pt would most benefit from

## 2022-11-03 NOTE — ASSESSMENT & PLAN NOTE
· Corrected calcium 11 5   · Received 2 L of IV fluids in the ER, will place on continuous fluids overnight and recheck CMP in morning  · If calcium is persistently elevated despite hydration, would consult Nephrology as could be secondary to newly diagnosed malignancy with lytic lesions on CT scan  · Check ionized calcium

## 2022-11-03 NOTE — CONSULTS
TeleConsultation - [address] 58 y o  male MRN: [id number]  Unit/Bed#: -01 Encounter: [id number]        REQUIRED DOCUMENTATION:     1  This service was provided via Telemedicine  2  Provider located at [facility]   3  TeleMed provider: Domingo Mansfield MD   4  Identify all parties in room with patient during tele consult: Patient   5  Patient was then informed that this was a Telemedicine visit and that the exam was being conducted confidentially over secure lines  My office door was closed  No one else was in the room  Patient acknowledged consent and understanding of privacy and security of the Telemedicine visit, and gave us permission to have the assistant stay in the room in order to assist with the history and to conduct the exam   I informed the patient that I have reviewed their record in Epic and presented the opportunity for them to ask any questions regarding the visit today  The patient agreed to participate  Discussed with Wyatt Brambila MD      Assessment/Plan     Principal Problem:    Suspected Primary malignant neoplasm of left lung metastatic to other site [facility])  Active Problems:    SIRS (systemic inflammatory response syndrome) (HCC)    Abnormal CT scan, gallbladder    Schizophrenia (HCC)    Hypercalcemia    Elevated glucose    Hyponatremia    Pedal edema    Assessment:  Roselia Sanches is a 57 y/o male with PMH significant for Schizophrenia that presented for Weakness  Patient currently nonadherent to olanzapine 100 mg nightly with personal preference  Patient without any active psychotic symptoms but does endorse intermittent auditory hallucinations as well as delusions with some insight  Recommend starting olanzapine 5 mg nightly this will target psychotic symptoms as well as can potentially use for chemotherapy related nausea  Patient without any indication for voluntary or involuntary inpatient psychiatric admission      Schizophrenia    Treatment Plan:    Planned Medication Changes:    -Start Olanzapine 5 mg qhs     Current Medications:     Current Facility-Administered Medications   Medication Dose Route Frequency Provider Last Rate   • acetaminophen  650 mg Oral Q8H PRN Mary Rodrigues PA-C     • heparin (porcine)  3-30 Units/kg/hr (Order-Specific) Intravenous Titrated Nancy Lauren MD 18 Units/kg/hr (11/03/22 1242)   • influenza vaccine  0 5 mL Intramuscular Prior to discharge Mary Rodrigues PA-C     • insulin glargine  6 Units Subcutaneous HS Nancy Lauren MD     • insulin lispro  1-5 Units Subcutaneous TID AC Mary Rodrigues PA-C     • insulin lispro  1-5 Units Subcutaneous HS Mary Rodrigues PA-C     • insulin lispro  4 Units Subcutaneous TID With Meals Nancy Lauren MD     • lidocaine  1 patch Topical Daily Nancy Lauren MD     • morphine injection  2 mg Intravenous Q4H PRN Nancy Lauren MD     • multi-electrolyte  125 mL/hr Intravenous Continuous Mary Rodrigues PA-C 125 mL/hr (11/03/22 1157)   • nicotine  1 patch Transdermal Daily Mary Rodrigues PA-C     • ondansetron  4 mg Intravenous Q6H PRN Mary Rodrigues PA-C     • piperacillin-tazobactam  3 375 g Intravenous Q6H Mary Rodrigues PA-C     • senna  1 tablet Oral HS PRN Mary Rodrigues PA-C         Risks / Benefits of Treatment:    Risks, benefits, and possible side effects of medications explained to patient and patient verbalizes understanding  Inpatient consult to Psychiatry  Consult performed by: Kevin Vang MD  Consult ordered by: Mary Rodrigues PA-C        Physician Requesting Consult: Pavithra Aguirre*  Principal Problem:Primary malignant neoplasm of left lung metastatic to other site [facility])    Reason for Consult: Psych Evaluation       History of Present Illness      Patient states that his siatic nerve has been causing him pain   Patient states that his ribs hurt and that he attribute this to his old bed which he replaced  Patient states that he has a stable place to live  Patient states that he sees a psychiatrist at the [facility],  Abdiaziz Jaramillo (per chart review)  Patient states that Clozaril is very hard core and he has not been taking it because he feels like he is in a coffin  Patient states that he is a [place] and for a period of time he thought he was [place] to some extent and was a very scary time  Patient states that he is adopted  Patient reports that he has some residual AH but none currently  Patient states that he has some difficulty with sleep and smokes 1/2 PPD of cigarettes  Patient states that he doesn't want any medication that affects his sex drive  Patient denies any current psychotic symptoms to include auditory or visual hallucinations nor any acute safety concerns to include suicidal or homicidal ideation       Psychiatric Review Of Systems:    sleep: yes  appetite changes: no  weight changes: no  energy/anergy: yes  interest/pleasure/anhedonia: yes  somatic symptoms: no  anxiety/panic: no  hammad: no  guilty/hopeless: no  self injurious behavior/risky behavior: no    Historical Information     Past Psychiatric History:     Psychiatric Hospitalizations:   • 10 past inpatient psychiatric admissions  Outpatient Treatment History:   • current treatment with psychiatrist/Advanced Practitioner (EDSON Santos )  Suicide Attempts:   • None  History of self-harm:   • None  Violence History:   • no  Past Psychiatric medication trials: Clozaril2    Substance Abuse History:    Denied route Denied   Use of Alcohol: denied    Longest clean time: Denied  History of IP/OP rehabilitation program: None  Smoking history: currently smokes 1 pack per day  Use of Caffeine: Yes    Family Psychiatric History:  Reports being adopted         Social History:    Education: high school diploma/GED  Learning Disabilities: Denied  Marital history: single  Living arrangement, social support: The patient alone  Occupational History: on permanent disability  Functioning Relationships: good support system  Other Pertinent History: None    Traumatic History:     Abuse: None  Other Traumatic Events: none    Past Medical History:   Diagnosis Date   • Bladder infection        Medical Review Of Systems:    Review of Systems    Meds/Allergies     all current active meds have been reviewed  No Known Allergies    Objective     Vital signs in last 24 hours:  Temp:  [97 4 °F (36 3 °C)-99 1 °F (37 3 °C)] 97 8 °F (36 6 °C)  HR:  [102-116] 106  Resp:  [17-20] 20  BP: (126-174)/(65-90) 129/65      Intake/Output Summary (Last 24 hours) at 11/3/2022 1521  Last data filed at 11/3/2022 1157  Gross per 24 hour   Intake 2540 ml   Output 1103 ml   Net 1437 ml       Mental Status Evaluation:    Appearance:  age appropriate   Behavior:  normal   Speech:  normal pitch and normal volume   Mood:  constricted   Affect:  normal   Language: naming objects   Thought Process:  normal   Associations intact associations   Thought Content:  normal   Perceptual Disturbances: None   Risk Potential: Suicidal Ideations none  Homicidal Ideations none  Potential for Aggression No   Sensorium:  person, place and time/date   Cognition:  recent and remote memory grossly intact   Consciousness:  alert    Attention: attention span and concentration were age appropriate   Intellect: within normal limits   Fund of Knowledge: awareness of current events: President   Insight:  fair   Judgment: fair   Muscle Strength:  Muscle Tone: normal NFT  normal   Gait/Station: normal gait/station   Motor Activity: no abnormal movements       Lab Results: I have personally reviewed all pertinent laboratory/tests results       Most Recent Labs:   Lab Results   Component Value Date    WBC 20 09 (H) 11/03/2022    RBC 3 09 (L) 11/03/2022    HGB 9 5 (L) 11/03/2022    HCT 28 0 (L) 11/03/2022     11/03/2022    RDW 15 7 (H) 11/03/2022    NEUTROABS 12 15 (H) 11/03/2022    SODIUM 132 (L) 11/03/2022    K 3 2 (L) 11/03/2022    CL 97 11/03/2022    CO2 32 11/03/2022    BUN 19 11/03/2022    CREATININE 0 71 11/03/2022    GLUC 287 (H) 11/03/2022    CALCIUM 8 8 11/03/2022    AST 16 11/03/2022    ALT 22 11/03/2022    ALKPHOS 94 11/03/2022    TP 5 5 (L) 11/03/2022    ALB 2 1 (L) 11/03/2022    TBILI 0 80 11/03/2022    HGBA1C 9 1 (H) 11/02/2022     11/02/2022       Imaging Studies: XR chest portable    Result Date: 11/3/2022  Narrative: CHEST INDICATION:   Cough, tachycardia, weakness  COMPARISON:  Abdomen CT 11/3/2022, CXR 3/20/2006  EXAM PERFORMED/VIEWS:  XR CHEST PORTABLE FINDINGS: Cardiomediastinal silhouette appears unremarkable  2 6 cm solid left lower lobe mass corresponding with the abdomen CT   4 cm cavitary right upper lobe mass  No acute disease  No effusion or pneumothorax  Left lateral 6th rib metastasis corresponding with the abdomen CT probable posterior left 5th rib metastasis  Impression: Right upper lobe cavitary mass, left lower lobe mass, and left rib metastases, some which are visible on the abdomen CT  Findings on the abdomen CT were reported to the emergency physician by Dr Jasmina Mahmood  Workstation performed: [id number]     VAS lower limb venous duplex study, complete bilateral    Result Date: 11/3/2022  Narrative:  [facility] REPORT CLINICAL: Indications: Patient presents with bilateral pedal edema x  1 week  FINDINGS:  Segment        Right                   Left                          Impression              Impression              Gastrocnemius  Occlusive Subsegmental  Occlusive Subsegmental  Calf Veins     Occlusive Subsegmental                             CONCLUSION:  Impression: RIGHT LOWER LIMB: Evaluation shows acute occlusive thrombus in the gastrocnemius veins from behind the knee to proximal calf and in the soleal vein from proximal to mid calf   No evidence of superficial thrombophlebitis noted  Doppler evaluation shows a normal response to augmentation maneuvers  Popliteal, posterior tibial and anterior tibial arterial Doppler waveforms are triphasic  LEFT LOWER LIMB: Evaluation shows acute occlusive thrombus in the gastrocnemius veins from behind the knee to proximal calf  No evidence of superficial thrombophlebitis noted  Doppler evaluation shows a normal response to augmentation maneuvers  Popliteal, posterior tibial and anterior tibial arterial Doppler waveforms are triphasic  Technical findings were given to GALEN Knox on 11/3/2022 @ 1045  SIGNATURE: Electronically Signed by: Misael Moore on 2022-11-03 02:12:15 PM    CT abdomen pelvis with contrast    Result Date: 11/3/2022  Narrative: CT ABDOMEN AND PELVIS WITH IV CONTRAST INDICATION:   Sepsis Sepsis rule out infection  Poor historian  COMPARISON:  CT of abdomen pelvis on March 20, 2006  TECHNIQUE:  CT examination of the abdomen and pelvis was performed  Axial, sagittal, and coronal 2D reformatted images were created from the source data and submitted for interpretation  Radiation dose length product (DLP) for this visit:  487 6 mGy-cm   This examination, like all CT scans performed in the [facility], was performed utilizing techniques to minimize radiation dose exposure, including the use of iterative reconstruction and automated exposure control  IV Contrast:  100 mL of iohexol (OMNIPAQUE) Enteric Contrast:  Enteric contrast was not administered  FINDINGS: ABDOMEN LOWER CHEST:  2 5 x 2 2 x 2 2 cm mass in the left lower lobe (series 2, image 6) concerning for malignancy  There is a small left pleural effusion with adjacent atelectasis  There is a small anterior pericardial effusion  Coronary artery calcifications  LIVER/BILIARY TREE:  Unremarkable  GALLBLADDER:  Distended  No calcified gallstones  No pericholecystic inflammatory change  SPLEEN:  Unremarkable  PANCREAS:  Unremarkable   ADRENAL GLANDS: Unremarkable  KIDNEYS/URETERS:  There are small wedge-shaped hypodensities measuring up to 1 3 cm within the lower pole of the right kidney and the upper pole of the left kidney which may be due to scarring versus small renal infarctions  No hydronephrosis  STOMACH AND BOWEL:  Unremarkable  APPENDIX:  No findings to suggest appendicitis  ABDOMINOPELVIC CAVITY:  No ascites  No pneumoperitoneum  No lymphadenopathy  VESSELS:  Moderate atherosclerotic disease of the abdominal aorta  There is narrowing of the distal abdominal aorta, the lumen measuring up to 8 mm in diameter just prior to the bifurcation  PELVIS REPRODUCTIVE ORGANS:  Enlarged prostate  URINARY BLADDER:  Unremarkable  ABDOMINAL WALL/INGUINAL REGIONS:  Unremarkable  OSSEOUS STRUCTURES:  There are lytic lesions compatible with metastasis  For example, there is a lytic lesion measuring 1 9 cm in the left iliac bone (series 2, image 57)  Additional smaller lytic lesions are seen in the left iliac bone (series 2, image 53)  A small lytic lesion seen within the left sacral ala  2 6 x 1 9 cm lytic expansile lesion in the left lateral 6th rib  Possible vague lytic lesions are seen within the L4 vertebral body  Impression: 1   2 5 x 2 2 x 2 2 cm mass in the left lower lobe concerning for lung cancer  2   Lytic lesions within the left 6th rib and pelvis bones compatible with osseous metastasis  3   2   Small left pleural effusion  4   3   Small anterior pericardial effusion  5   Small wedge-shaped hypodensities within bilateral kidneys may reflect scarring versus small renal infarcts  6   Other findings as above   I personally discussed this study with Dr Maggy Foreman on 11/3/2022 at 1:54 AM  Workstation performed: [id number]     EKG/Pathology/Other Studies:   Lab Results   Component Value Date    VENTRATE 114 11/02/2022    ATRIALRATE 114 11/02/2022    PRINT 132 11/02/2022    QRSDINT 108 11/02/2022    QTINT 316 11/02/2022    QTCINT 435 11/02/2022    PAXIS 70 11/02/2022    QRSAXIS 78 11/02/2022    TWAVEAXIS 72 11/02/2022        Code Status: Level 1 - Full Code  Advance Directive and Living Will:      Power of :    POLST:      Counseling / Coordination of Care: Total floor / unit time spent today 30 minutes  Greater than 50% of total time was spent with the patient and / or family counseling and / or coordination of care  A description of the counseling / coordination of care: Direct Patient Care, Chart Review, and Documentation

## 2022-11-03 NOTE — ASSESSMENT & PLAN NOTE
· Sodium at 129 with chloride at 91, sodium corrects to 131 for hyperglycemia   · Suspect hypovolemia as contributory   · Recheck CMP in morning after IV fluids

## 2022-11-03 NOTE — QUICK NOTE
Got a message from Ani Lopez from  Psychiatry recommending to start patient olanzapine 5 mg q h s      Renee Simple

## 2022-11-03 NOTE — ASSESSMENT & PLAN NOTE
· Serum glucose on admission is 233   · No prior reported history of DM, however patient poor historian  · Will obtain hemoglobin A1c and start on SSI

## 2022-11-03 NOTE — ASSESSMENT & PLAN NOTE
· Self-reported history of schizophrenia   · Reportedly treated with clonazepam 100 mg HS, however patient reports non adherence with this   · Presented to the ED with acute psychosis requiring Haldol 5 mg IM x1  · Psychiatry consult

## 2022-11-03 NOTE — ASSESSMENT & PLAN NOTE
· Sirs criteria met on admission:  Tachycardia and leukocytosis at 17 36 K without bandemia   · No severe sepsis criteria met   · Unclear if infectious source versus secondary to suspected metastatic lung cancer as seen on CT scan   · UA without infection   · Procalcitonin elevated at 11 67  · ?   Elevation secondary to infection or possible small cell lung cancer  · CT scan does show distended gallbladder and with elevated T bili and alk-phos, will treat with IV Zosyn to cover for possible infection  · Blood cultures x2, pending

## 2022-11-03 NOTE — QUICK NOTE
Medical Oncology Brief Note:  Medical Oncology is in receipt of consult  Patient will be seen when present on campus tomorrow  Briefly, patient presented with generalized weakness, fatigue, weight loss  CT of abdomen and pelvis shows 2 5 x 2 2 x 2 2 cm mass in the left lower lobe concerning for lung cancer as well as lytic lesions within the left 6th rib and pelvic bones compatible with osseous metastasis  Above is concerning for primary lung cancer  CT of chest has been ordered with contrast to complete staging  Tissue sample would be necessary in order to make a diagnosis  IR should be consulted for biopsy  Treatment plan to be developed once tissue diagnosis is established    Celia HILLMAN   [facility]  Hematology Oncology

## 2022-11-03 NOTE — ED PROVIDER NOTES
History  Chief Complaint   Patient presents with   • Weakness - Generalized     EMS from home; generalized weakness for past month per pt, EMS got pt from couch; denies v/d/fever     57 yo male with history of schizophrenia and medication noncompliance was brought here from the [facility] area by [facility] paramedics due to acute psychosis  He is noted to have tachycardia and appear drehydrated on exam   He is very tangential in his speaking and is says that God heels him and he has been through [facility] Holy worse  He says he recently was in [facility] being held in the done gin in the basement with severe sciatica that they were not treating and loud alarms going off  He has had finally the  helped him escape that hospital   He admits being noncompliant with clozaril and states that he takes no medications  He says he has have a cough and chest infection as well as urinary tract infection  States he has been very weak and lost lot of weight over the last years  His right leg          Prior to Admission Medications   Prescriptions Last Dose Informant Patient Reported? Taking? cloZAPine (CLOZARIL) 100 mg tablet   No No   Sig: Take 1 tablet (100 mg total) by mouth daily at bedtime      Facility-Administered Medications: None       Past Medical History:   Diagnosis Date   • Bladder infection        History reviewed  No pertinent surgical history  History reviewed  No pertinent family history  I have reviewed and agree with the history as documented      E-Cigarette/Vaping   • E-Cigarette Use Never User      E-Cigarette/Vaping Substances     Social History     Tobacco Use   • Smoking status: Current Every Day Smoker     Packs/day: 1 00     Types: Cigarettes   • Smokeless tobacco: Never Used   Vaping Use   • Vaping Use: Never used   Substance Use Topics   • Alcohol use: Not Currently   • Drug use: Never       Review of Systems    Physical Exam  Physical Exam    Vital Signs  ED Triage Vitals [11/02/22 2116]   Temperature Pulse Respirations Blood Pressure SpO2   98 6 °F (37 °C) (!) 116 18 (!) 171/90 100 %      Temp Source Heart Rate Source Patient Position - Orthostatic VS BP Location FiO2 (%)   Temporal Monitor Lying Right arm --      Pain Score       9           Vitals:    11/02/22 2116 11/02/22 2200 11/03/22 0000   BP: (!) 171/90 148/83 159/85   Pulse: (!) 116 (!) 109 102   Patient Position - Orthostatic VS: Lying           Visual Acuity      ED Medications  Medications   sodium chloride 0 9 % bolus 1,000 mL (1,000 mL Intravenous New Bag 11/2/22 2350)   haloperidol lactate (HALDOL) injection 5 mg (5 mg Intramuscular Given 11/3/22 0004)   ketorolac (TORADOL) injection 15 mg (15 mg Intravenous Given 11/3/22 0002)       Diagnostic Studies  Results Reviewed     Procedure Component Value Units Date/Time    Blood gas, Venous [[id number]] Collected: 11/03/22 0012    Lab Status: No result Specimen: Blood from Arm, Right Updated: 11/03/22 0012    CBC and differential [[id number]]  (Abnormal) Collected: 11/02/22 2349    Lab Status: Final result Specimen: Blood from Arm, Right Updated: 11/03/22 0006     WBC 17 36 Thousand/uL      RBC 3 90 Million/uL      Hemoglobin 12 1 g/dL      Hematocrit 35 3 %      MCV 91 fL      MCH 31 0 pg      MCHC 34 3 g/dL      RDW 15 5 %      MPV 9 6 fL      Platelets 319 Thousands/uL      nRBC 0 /100 WBCs      Neutrophils Relative 73 %      Immat GRANS % 2 %      Lymphocytes Relative 19 %      Monocytes Relative 5 %      Eosinophils Relative 1 %      Basophils Relative 0 %      Neutrophils Absolute 12 72 Thousands/µL      Immature Grans Absolute 0 26 Thousand/uL      Lymphocytes Absolute 3 35 Thousands/µL      Monocytes Absolute 0 91 Thousand/µL      Eosinophils Absolute 0 08 Thousand/µL      Basophils Absolute 0 04 Thousands/µL     Lactic acid [[id number]] Collected: 11/02/22 2349    Lab Status:  In process Specimen: Blood from Arm, Right Updated: 11/03/22 0002    Protime-INR [[id number]] Collected: 11/02/22 2349    Lab Status: In process Specimen: Blood from Arm, Right Updated: 11/03/22 0002    APTT [[id number]] Collected: 11/02/22 2349    Lab Status: In process Specimen: Blood from Arm, Right Updated: 11/03/22 0002    Procalcitonin [[id number]] Collected: 11/02/22 2349    Lab Status: In process Specimen: Blood from Arm, Right Updated: 11/03/22 0002    Comprehensive metabolic panel [[id number]] Collected: 11/02/22 2349    Lab Status: In process Specimen: Blood from Arm, Right Updated: 11/03/22 0002    Blood culture #1 [[id number]] Collected: 11/02/22 2349    Lab Status: In process Specimen: Blood from Arm, Left Updated: 11/03/22 0002    Blood culture #2 [[id number]] Collected: 11/02/22 2349    Lab Status: In process Specimen: Blood from Arm, Right Updated: 11/03/22 0002    FLU/RSV/COVID - if FLU/RSV clinically relevant [[id number]] Collected: 11/02/22 2349    Lab Status:  In process Specimen: Nares from Nose Updated: 11/02/22 2358    UA w Reflex to Microscopic w Reflex to Culture [[id number]]     Lab Status: No result Specimen: Urine                  XR chest portable   ED Interpretation by Ayanna Buchanan DO (11/02 2350)   No infiltrate, effusion or pneumothorax                 Procedures  ECG 12 Lead Documentation Only    Date/Time: 11/2/2022 10:07 PM  Performed by: Ayanna Buchanan DO  Authorized by: Ayanna Buchanan DO     ECG reviewed by me, the ED Provider: yes    Patient location:  ED  Previous ECG:     Previous ECG:  Unavailable    Comparison to cardiac monitor: Yes    Rhythm:     Rhythm: sinus tachycardia    Ectopy:     Ectopy: none    QRS:     QRS axis:  Normal    QRS intervals:  Normal  Conduction:     Conduction: normal    ST segments:     ST segments:  Normal  T waves:     T waves: normal               ED Course                                             MDM  Number of Diagnoses or Management Options  Diagnosis management comments: 58-year-old man history of schizophrenia, noncompliant with medications is psychotic currently with sepsis  Appears dry  Patient has hyponatremia  Source of infection is unclear  CT of abdomen and pelvis ordered  Will be admitted after CT of abdomen and pelvis  Signed out to nest EP at end of shift  Amount and/or Complexity of Data Reviewed  Clinical lab tests: ordered and reviewed  Tests in the radiology section of CPT®: ordered  Review and summarize past medical records: yes  Discuss the patient with other providers: yes  Independent visualization of images, tracings, or specimens: yes        Disposition  Final diagnoses:   None     ED Disposition     None      Follow-up Information    None         Patient's Medications   Discharge Prescriptions    No medications on file       No discharge procedures on file      PDMP Review     None          ED Provider  Electronically Signed by           Ruthie Mesa DO  11/03/22 9546

## 2022-11-04 ENCOUNTER — APPOINTMENT (INPATIENT)
Dept: NON INVASIVE DIAGNOSTICS | Facility: HOSPITAL | Age: 62
End: 2022-11-04

## 2022-11-04 LAB
ALBUMIN SERPL BCP-MCNC: 2.1 G/DL (ref 3.5–5)
ALP SERPL-CCNC: 94 U/L (ref 46–116)
ALT SERPL W P-5'-P-CCNC: 21 U/L (ref 12–78)
ANION GAP SERPL CALCULATED.3IONS-SCNC: -3 MMOL/L (ref 4–13)
AORTIC ROOT: 3.7 CM
APICAL FOUR CHAMBER EJECTION FRACTION: 57 %
APTT PPP: 52 SECONDS (ref 23–37)
APTT PPP: 72 SECONDS (ref 23–37)
APTT PPP: 81 SECONDS (ref 23–37)
ASCENDING AORTA: 3.2 CM
AST SERPL W P-5'-P-CCNC: 13 U/L (ref 5–45)
ATRIAL RATE: 114 BPM
BILIRUB SERPL-MCNC: 0.6 MG/DL (ref 0.2–1)
BUN SERPL-MCNC: 14 MG/DL (ref 5–25)
CALCIUM ALBUM COR SERPL-MCNC: 10.5 MG/DL (ref 8.3–10.1)
CALCIUM SERPL-MCNC: 9 MG/DL (ref 8.3–10.1)
CARDIAC TROPONIN I PNL SERPL HS: 119 NG/L (ref 8–18)
CHLORIDE SERPL-SCNC: 99 MMOL/L (ref 96–108)
CO2 SERPL-SCNC: 37 MMOL/L (ref 21–32)
CREAT SERPL-MCNC: 0.67 MG/DL (ref 0.6–1.3)
DOP CALC LVOT AREA: 4.52 CM2
DOP CALC LVOT DIAMETER: 2.4 CM
E WAVE DECELERATION TIME: 150 MS
ERYTHROCYTE [DISTWIDTH] IN BLOOD BY AUTOMATED COUNT: 15.8 % (ref 11.6–15.1)
FRACTIONAL SHORTENING: 32 (ref 28–44)
GFR SERPL CREATININE-BSD FRML MDRD: 102 ML/MIN/1.73SQ M
GLUCOSE SERPL-MCNC: 180 MG/DL (ref 65–140)
GLUCOSE SERPL-MCNC: 198 MG/DL (ref 65–140)
GLUCOSE SERPL-MCNC: 205 MG/DL (ref 65–140)
GLUCOSE SERPL-MCNC: 232 MG/DL (ref 65–140)
GLUCOSE SERPL-MCNC: 257 MG/DL (ref 65–140)
GLUCOSE SERPL-MCNC: 290 MG/DL (ref 65–140)
HCT VFR BLD AUTO: 26.9 % (ref 36.5–49.3)
HGB BLD-MCNC: 8.9 G/DL (ref 12–17)
INTERVENTRICULAR SEPTUM IN DIASTOLE (PARASTERNAL SHORT AXIS VIEW): 1 CM
INTERVENTRICULAR SEPTUM: 1 CM (ref 0.6–1.1)
LEFT ATRIUM AREA SYSTOLE SINGLE PLANE A4C: 16.8 CM2
LEFT ATRIUM SIZE: 2.9 CM
LEFT INTERNAL DIMENSION IN SYSTOLE: 2.8 CM (ref 2.1–4)
LEFT VENTRICULAR INTERNAL DIMENSION IN DIASTOLE: 4.1 CM (ref 3.5–6)
LEFT VENTRICULAR POSTERIOR WALL IN END DIASTOLE: 1 CM
LEFT VENTRICULAR STROKE VOLUME: 45 ML
LVSV (TEICH): 45 ML
MAGNESIUM SERPL-MCNC: 1.8 MG/DL (ref 1.6–2.6)
MCH RBC QN AUTO: 30.4 PG (ref 26.8–34.3)
MCHC RBC AUTO-ENTMCNC: 33.1 G/DL (ref 31.4–37.4)
MCV RBC AUTO: 92 FL (ref 82–98)
MV E'TISSUE VEL-SEP: 18 CM/S
MV PEAK A VEL: 0.01 M/S
MV PEAK E VEL: 106 CM/S
MV STENOSIS PRESSURE HALF TIME: 43 MS
MV VALVE AREA P 1/2 METHOD: 5.12
NT-PROBNP SERPL-MCNC: 841 PG/ML
P AXIS: 70 DEGREES
PHOSPHATE SERPL-MCNC: 3.2 MG/DL (ref 2.3–4.1)
PLATELET # BLD AUTO: 195 THOUSANDS/UL (ref 149–390)
PMV BLD AUTO: 9.5 FL (ref 8.9–12.7)
POTASSIUM SERPL-SCNC: 3.7 MMOL/L (ref 3.5–5.3)
PR INTERVAL: 132 MS
PROCALCITONIN SERPL-MCNC: 3.26 NG/ML
PROT SERPL-MCNC: 5.6 G/DL (ref 6.4–8.4)
QRS AXIS: 78 DEGREES
QRSD INTERVAL: 108 MS
QT INTERVAL: 316 MS
QTC INTERVAL: 435 MS
RA PRESSURE ESTIMATED: 3 MMHG
RBC # BLD AUTO: 2.93 MILLION/UL (ref 3.88–5.62)
RIGHT ATRIUM AREA SYSTOLE A4C: 16.6 CM2
RIGHT VENTRICLE ID DIMENSION: 4 CM
SL CV LV EF: 65
SL CV PED ECHO LEFT VENTRICLE DIASTOLIC VOLUME (MOD BIPLANE) 2D: 76 ML
SL CV PED ECHO LEFT VENTRICLE SYSTOLIC VOLUME (MOD BIPLANE) 2D: 31 ML
SODIUM SERPL-SCNC: 133 MMOL/L (ref 135–147)
T WAVE AXIS: 72 DEGREES
VENTRICULAR RATE: 114 BPM
WBC # BLD AUTO: 17.51 THOUSAND/UL (ref 4.31–10.16)

## 2022-11-04 RX ORDER — INSULIN LISPRO 100 [IU]/ML
1-5 INJECTION, SOLUTION INTRAVENOUS; SUBCUTANEOUS
Status: DISCONTINUED | OUTPATIENT
Start: 2022-11-04 | End: 2022-11-14

## 2022-11-04 RX ORDER — INSULIN LISPRO 100 [IU]/ML
3 INJECTION, SOLUTION INTRAVENOUS; SUBCUTANEOUS
Status: DISCONTINUED | OUTPATIENT
Start: 2022-11-05 | End: 2022-11-12

## 2022-11-04 RX ORDER — INSULIN LISPRO 100 [IU]/ML
1-5 INJECTION, SOLUTION INTRAVENOUS; SUBCUTANEOUS
Status: DISCONTINUED | OUTPATIENT
Start: 2022-11-05 | End: 2022-11-18 | Stop reason: HOSPADM

## 2022-11-04 RX ORDER — INSULIN GLARGINE 100 [IU]/ML
6 INJECTION, SOLUTION SUBCUTANEOUS
Status: DISCONTINUED | OUTPATIENT
Start: 2022-11-04 | End: 2022-11-06

## 2022-11-04 RX ADMIN — INSULIN GLARGINE 6 UNITS: 100 INJECTION, SOLUTION SUBCUTANEOUS at 21:30

## 2022-11-04 RX ADMIN — ACETAMINOPHEN 650 MG: 325 TABLET, FILM COATED ORAL at 12:21

## 2022-11-04 RX ADMIN — ACETAMINOPHEN 650 MG: 325 TABLET, FILM COATED ORAL at 02:52

## 2022-11-04 RX ADMIN — PIPERACILLIN AND TAZOBACTAM 3.38 G: 3; .375 INJECTION, POWDER, LYOPHILIZED, FOR SOLUTION INTRAVENOUS at 09:23

## 2022-11-04 RX ADMIN — ACETAMINOPHEN 650 MG: 325 TABLET, FILM COATED ORAL at 21:07

## 2022-11-04 RX ADMIN — HEPARIN SODIUM 20 UNITS/KG/HR: 10000 INJECTION, SOLUTION INTRAVENOUS at 22:39

## 2022-11-04 RX ADMIN — PIPERACILLIN AND TAZOBACTAM 3.38 G: 3; .375 INJECTION, POWDER, LYOPHILIZED, FOR SOLUTION INTRAVENOUS at 16:41

## 2022-11-04 RX ADMIN — PIPERACILLIN AND TAZOBACTAM 3.38 G: 3; .375 INJECTION, POWDER, LYOPHILIZED, FOR SOLUTION INTRAVENOUS at 21:09

## 2022-11-04 RX ADMIN — LIDOCAINE 5% 1 PATCH: 700 PATCH TOPICAL at 09:22

## 2022-11-04 RX ADMIN — OLANZAPINE 5 MG: 5 TABLET, FILM COATED ORAL at 21:07

## 2022-11-04 RX ADMIN — HEPARIN SODIUM 20 UNITS/KG/HR: 10000 INJECTION, SOLUTION INTRAVENOUS at 07:18

## 2022-11-04 RX ADMIN — MORPHINE SULFATE 2 MG: 2 INJECTION, SOLUTION INTRAMUSCULAR; INTRAVENOUS at 07:47

## 2022-11-04 RX ADMIN — INSULIN LISPRO 2 UNITS: 100 INJECTION, SOLUTION INTRAVENOUS; SUBCUTANEOUS at 21:30

## 2022-11-04 RX ADMIN — PIPERACILLIN AND TAZOBACTAM 3.38 G: 3; .375 INJECTION, POWDER, LYOPHILIZED, FOR SOLUTION INTRAVENOUS at 04:20

## 2022-11-04 RX ADMIN — NICOTINE 1 PATCH: 21 PATCH, EXTENDED RELEASE TRANSDERMAL at 09:21

## 2022-11-04 RX ADMIN — INSULIN LISPRO 2 UNITS: 100 INJECTION, SOLUTION INTRAVENOUS; SUBCUTANEOUS at 17:30

## 2022-11-04 NOTE — NURSING NOTE
Nurse spoke with patient this am, after he said he wanted to leave  This nurse asked him what he would do in the instance that it turned out that he had cancer  Patient told this nurse that he did not want chemotherapy and he wanted to go home  This nurse asked how he would manage at home if he were to sign out AMA  Nurse explained that if he stayed here and it was determined that he had cancer, that we could get palliative care on board to assist with pain management and determine further needs at that time  Patient asked this nurse to leave the room so he could call his friend  This nurse left the room and neuropsych called for consult with patient

## 2022-11-04 NOTE — ASSESSMENT & PLAN NOTE
· Sirs criteria met on admission:  Tachycardia and leukocytosis at 17 36 K without bandemia   · No severe sepsis criteria met   · Unclear if infectious source versus secondary to suspected metastatic lung cancer as seen on CT scan   · UA without infection   · Procalcitonin elevated at 11 67  · ?   Elevation secondary to infection or possible small cell lung cancer  · CT scan does show distended gallbladder and with elevated T bili and alk-phos, will treat with IV Zosyn to cover for possible infection  · Blood cultures x2, shows no growth in 24 hours

## 2022-11-04 NOTE — ASSESSMENT & PLAN NOTE
· Self-reported history of schizophrenia   · Reportedly treated with clonazepam 100 mg HS, however patient reports non adherence with this   · Presented to the ED with acute psychosis requiring Haldol 5 mg IM x1  · Neuro- psychiatry was consulted because of question about patient's capacity  Patient did not seem to have insight into his condition  He does not have capacity to make medical decisions at this time

## 2022-11-04 NOTE — PHYSICAL THERAPY NOTE
PHYSICAL THERAPY EVAL/TX  Physical Therapy Evaluation    Performed at least 2 patient identifiers during session:  Patient Active Problem List   Diagnosis    SIRS (systemic inflammatory response syndrome) ([address] )    Suspected Primary malignant neoplasm of left lung metastatic to other site [facility])    Abnormal CT scan, gallbladder    Schizophrenia (HCC)    Hypercalcemia    Elevated glucose    Hyponatremia    Pedal edema       Past Medical History:   Diagnosis Date    Bladder infection        History reviewed  No pertinent surgical history  11/04/22 0822   PT Last Visit   PT Visit Date 11/04/22   Pain Assessment   Pain Assessment Tool 0-10   Pain Score 9   Pain Location/Orientation Location: Leg;Orientation: Right   Hospital Pain Intervention(s) Medication (See MAR)   Home Living   Type of Home Apartment   Home Layout   (0STE)   Bathroom Shower/Tub Walk-in shower   Bathroom Toilet Standard   Bathroom Equipment Grab bars in shower; Shower chair;Grab bars around toilet   Additional Comments Has rollator from neighbor  Has only been using it recently  Prior Function   Level of [place] Independent with ADLs; Independent with functional mobility; Independent with IADLS   Lives With Alone   IADLs Family/Friend/Other provides transportation   Falls in the last 6 months 0   General   Additional Pertinent History Hx of schizophrenia   Family/Caregiver Present No   Cognition   Overall Cognitive Status Impaired   Arousal/Participation Alert   Orientation Level Oriented to person;Oriented to time;Disoriented to situation;Oriented to place  (Able to state month and year  Needed cueing for date and day of week)   Following Commands Follows one step commands without difficulty   Comments Slightly impulsive     Subjective   Subjective " I haven't been out of bed "   RLE Assessment   RLE Assessment   (Hip flexion 3/5, knee flex/ext 2/5, ankle PF/DF 3/5)   LLE Assessment   LLE Assessment WFL   Light Touch   RLE Light Touch Grossly intact   LLE Light Touch Grossly intact   Bed Mobility   Supine to Sit 5  Supervision   Additional items HOB elevated; Increased time required;Verbal cues   Additional Comments Sat edge of bed for approx 4-5 minutes  Supervision level   Transfers   Sit to Stand 4  Minimal assistance   Additional items Assist x 1; Armrests; Increased time required; Impulsive;Verbal cues   Stand to Sit 4  Minimal assistance   Additional items Assist x 1; Armrests; Increased time required;Verbal cues; Impulsive   Additional Comments Refer to treatment session for additional mobility   Balance   Static Sitting Good   Dynamic Sitting Fair +   Static Standing Fair   Dynamic Standing Fair -   Ambulatory Zero   Endurance Deficit   Endurance Deficit Yes   Endurance Deficit Description Limited by pain   Activity Tolerance   Activity Tolerance Patient limited by pain   Nurse Made Aware Nataliya AARON   Assessment   Prognosis Guarded   Problem List Decreased strength;Decreased endurance; Impaired balance;Decreased mobility; Decreased cognition; Impaired judgement;Pain   Assessment Patient is a 61y/o M who presents with 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  Also with hypercalcemia, SIRS, hyponatremia  [facility]  Patient was independent prior to admission  Current medical status includes pain, fall risk, bed/chair alarm, impulsivity, decreased insight, decreased strength, balance, endurance and mobility  Significant weakness In the RLE with knee buckling in stance  Patient required assistance for all mobility  Unable to ambulate due to R quad weakness  Patient also limited by pain reporting high pain levels even after receiving medication  RN aware  Patient is a high fall risk  He is not at his functional baseline and rehab is recommended   The patient's AM-PAC Basic Mobility Inpatient Short Form Raw Score is 13  A Raw score of less than or equal to 17 suggests the patient may benefit from discharge to post-acute rehabilitation services  Please also refer to the recommendation of the Physical Therapist for safe discharge planning  Barriers to Discharge Decreased caregiver support   Barriers to Discharge Comments Lives alone   Goals   Patient Goals To have less pain   STG Expiration Date 11/18/22   Short Term Goal #1 1  peform supine<>sit with HOB flat without the use of bedrails ind 2  Perform sit<>stand transfers mod I 3  Perform stand pivot transfers mod I 4  Ambulate 20ft with RW at a supervision level  PT Treatment Day 1   Plan   Treatment/Interventions Functional transfer training;LE strengthening/ROM; Therapeutic exercise; Endurance training;Cognitive reorientation;Equipment eval/education;Gait training;Bed mobility;Spoke to nursing;Spoke to case management   PT Frequency 3-5x/wk   Recommendation   PT Discharge Recommendation Post acute rehabilitation services   Equipment Recommended Wheelchair;Walker   AM-PAC Basic Mobility Inpatient   Turning in Bed Without Bedrails 3   Lying on Back to Sitting on Edge of Flat Bed 3   Moving Bed to Chair 2   Standing Up From Chair 3   Walk in Room 1   Climb 3-5 Stairs 1   Basic Mobility Inpatient Raw Score 13   Basic Mobility Standardized Score 33 99   Highest Level Of Mobility   -HL Goal 4: Move to chair/commode   -HLM Achieved 4: Move to chair/commode   Additional Treatment Session   Start Time 0830   End Time 0840   Treatment Assessment Performed sit<>stand transfer with min A x 1  Performed stand pivot tranfser with mod A x 1  Tranfser towards LLE  Knee buckling noted in the RLE  Not appropriate for further mobility  Increased pain with activity  End of Consult   Patient Position at End of Consult Bedside chair;Bed/Chair alarm activated; All needs within reach     Cadence Sibley             Patient Name: Kal Robledo  XUCVM'Y Date: 11/4/2022

## 2022-11-04 NOTE — ASSESSMENT & PLAN NOTE
· Presents with generalized weakness, fatigue, and weight loss  · CT a/P: " 2 5 x 2 2 x 2 2 cm mass in the left lower lobe concerning for lung cancer  Lytic lesions within the left 6th rib and pelvis bones compatible with osseous metastasis "  · Longstanding smoking history  · Heme-Onc recommended tissue diagnosis with complete staging    They recommended patient will need anticoagulation which will be lifelong based on probable stage IV malignancy causing hypercoagulable state  · IR tissue biopsy pending  · Palliative care is on board

## 2022-11-04 NOTE — PLAN OF CARE
Problem: PHYSICAL THERAPY ADULT  Goal: Performs mobility at highest level of function for planned discharge setting  See evaluation for individualized goals  Description: Treatment/Interventions: Functional transfer training, LE strengthening/ROM, Therapeutic exercise, Endurance training, Cognitive reorientation, Equipment eval/education, Gait training, Bed mobility, Spoke to nursing, Spoke to case management  Equipment Recommended: Wheelchair, Lamar Bearded       See flowsheet documentation for full assessment, interventions and recommendations  Note: Prognosis: Guarded  Problem List: Decreased strength, Decreased endurance, Impaired balance, Decreased mobility, Decreased cognition, Impaired judgement, Pain  Assessment: Patient is a 61y/o M who presents with 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  Also with hypercalcemia, SIRS, hyponatremia  Patient resides alone in an apartment with 0STE  Patient was independent prior to admission  Current medical status includes pain, fall risk, bed/chair alarm, impulsivity, decreased insight, decreased strength, balance, endurance and mobility  Significant weakness In the RLE with knee buckling in stance  Patient required assistance for all mobility  Unable to ambulate due to R quad weakness  Patient also limited by pain reporting high pain levels even after receiving medication  RN aware  Patient is a high fall risk  He is not at his functional baseline and rehab is recommended  The patient's AM-PAC Basic Mobility Inpatient Short Form Raw Score is 13  A Raw score of less than or equal to 17 suggests the patient may benefit from discharge to post-acute rehabilitation services  Please also refer to the recommendation of the Physical Therapist for safe discharge planning    Barriers to Discharge: Decreased caregiver support  Barriers to Discharge Comments: Lives alone  PT Discharge Recommendation: Post acute rehabilitation services    See flowsheet documentation for full assessment

## 2022-11-04 NOTE — QUICK NOTE
As pt noted to have b/l DVTs on venous duplexes, CT chest changed to CTA chest to evaluate for PE, as well  CTA chest:  - "A few small subsegmental emboli in the right middle lobe, right lower lobe, and inferior lingula " - echo ordered, already on heparin gtt - BNP and trop ordered for AM   - "3 6 cm thick walled cavitary right upper lobe tumor and 2 8 cm necrotic left lower lobe tumor  Ill-defined infiltration of the mediastinal fat likely due to tumor with enlarged left hilar node, likely metastatic "  - "Metastatic disease to bilateral ribs and T6 and T8 with severe compression deformities  Given mild retropulsion of bone into the spinal canal at the level of T8, recommend further evaluation with MRI to evaluate for cord compression  " - MRI already ordered       RUQ US also resulted this evening: "Distended gallbladder with sludge, top normal wall and trace pericholecystic fluid though no wall edema or stones and Emerson's sign could not be assessed due to medication  These findings may represent acute cholecystitis "  - pt already on zosyn - continue  - surgery consulted - recommend NPO at this time - insulin changed to q6h SSI and lantus d/c while NPO       Palliative care also consulted due to extent of disease

## 2022-11-04 NOTE — CASE MANAGEMENT
Case Management Progress Note    Patient name Mirian Valera  Location [address] [id number] MRN [id number]  : 1960 Date 2022       LOS (days): 1  Geometric Mean LOS (GMLOS) (days): 2 10  Days to GMLOS:0 6        OBJECTIVE:        Current admission status: Inpatient  Preferred Pharmacy:   [address] [place] [address], [address] [place] [id number]  Phone: [phone] Fax: [phone]    Primary Care Provider: No primary care provider on file  Primary Insurance: MEDICARE  Secondary Insurance: [facility]    PROGRESS NOTE:    Per care coordination rounds, pt is not medically stable for dc  Pt deemed to not have capacity  CM was informed by Yuan López PA-C that pt's friend Jesusita Burgess is willing to help  Unsure of dc planning needs at this time; palliative care continuing to follow for [address] discussion  CM cancelled [facility] referrals as this is no longer the plan

## 2022-11-04 NOTE — PROGRESS NOTES
[place]  Progress Note Mary Card 1960, 58 y o  male MRN: [id number]  Unit/Bed#: -01 Encounter: [id number]  Primary Care Provider: No primary care provider on file  Date and time admitted to hospital: 11/2/2022  9:13 PM    Pedal edema  Assessment & Plan  · B/l pedal edema (R>L) for a couple of days   · Due to probable newly diagnosed metastatic cancer on CT scan, will obtain venous duplex    Hyponatremia  Assessment & Plan  · Sodium at 129 with chloride at 91, sodium corrects to 131 for hyperglycemia   · Suspect hypovolemia as contributory   · Recheck CMP in morning after IV fluids    Elevated glucose  Assessment & Plan  · Serum glucose on admission is 233   · No prior reported history of DM, however patient poor historian  · Will obtain hemoglobin A1c and start on SSI    Hypercalcemia  Assessment & Plan  · Corrected calcium 11 5   · Received 2 L of IV fluids in the ER, will place on continuous fluids overnight and recheck CMP in morning  · If calcium is persistently elevated despite hydration, would consult Nephrology as could be secondary to newly diagnosed malignancy with lytic lesions on CT scan  · Check ionized calcium     Schizophrenia ([facility] [address] )  Assessment & Plan  · Self-reported history of schizophrenia   · Reportedly treated with clonazepam 100 mg HS, however patient reports non adherence with this   · Presented to the ED with acute psychosis requiring Haldol 5 mg IM x1  · Neuro- psychiatry was consulted because of question about patient's capacity  Patient did not seem to have insight into his condition  He does not have capacity to make medical decisions at this time      Abnormal CT scan, gallbladder  Assessment & Plan  · Distended gallbladder on CT scan   · Obtain right upper quadrant ultrasound patient also with elevated T bili and alk-phos  · Continue Zosyn for now    SIRS (systemic inflammatory response syndrome) (HCC)  Assessment & Plan  · Sirs criteria met on admission:  Tachycardia and leukocytosis at 17 36 K without bandemia   · No severe sepsis criteria met   · Unclear if infectious source versus secondary to suspected metastatic lung cancer as seen on CT scan   · UA without infection   · Procalcitonin elevated at 11 67  · ? Elevation secondary to infection or possible small cell lung cancer  · CT scan does show distended gallbladder and with elevated T bili and alk-phos, will treat with IV Zosyn to cover for possible infection  · Blood cultures x2, shows no growth in 24 hours    * Suspected Primary malignant neoplasm of left lung metastatic to other site [facility])  Assessment & Plan  · Presents with generalized weakness, fatigue, and weight loss  · CT a/P: " 2 5 x 2 2 x 2 2 cm mass in the left lower lobe concerning for lung cancer  Lytic lesions within the left 6th rib and pelvis bones compatible with osseous metastasis "  · Longstanding smoking history  · Heme-Onc recommended tissue diagnosis with complete staging  They recommended patient will need anticoagulation which will be lifelong based on probable stage IV malignancy causing hypercoagulable state  · IR tissue biopsy pending  · Palliative care is on board        VTE Pharmacologic Prophylaxis: VTE Score: 4 Moderate Risk (Score 3-4) - Pharmacological DVT Prophylaxis Ordered: enoxaparin (Lovenox)  Time Spent for Care: 45 minutes  More than 50% of total time spent on counseling and coordination of care as described above  Current Length of Stay: 1 day(s)  Current Patient Status: Inpatient   Certification Statement: The patient will continue to require additional inpatient hospital stay due to Possible malignancy  Discharge Plan: Anticipate discharge in 24-48 hrs to home      Code Status: Level 1 - Full Code    Subjective:   No fresh complaint    Objective:   Patient looked well body lacked inside into his medical condition  Vitals:   Temp (24hrs), Av 5 °F (36 9 °C), Min:98 2 °F (36 8 °C), Max:98 8 °F (37 1 °C)    Temp:  [98 2 °F (36 8 °C)-98 8 °F (37 1 °C)] 98 2 °F (36 8 °C)  HR:  [103-115] 115  BP: (136-145)/(69-91) 145/91  SpO2:  [95 %-97 %] 97 %  Body mass index is 23 63 kg/m²  Input and Output Summary (last 24 hours): Intake/Output Summary (Last 24 hours) at 11/4/2022 1823  Last data filed at 11/4/2022 1715  Gross per 24 hour   Intake 900 ml   Output 2150 ml   Net -1250 ml       Physical Exam:   Physical Exam  Vitals and nursing note reviewed  Constitutional:       Appearance: He is well-developed  HENT:      Head: Normocephalic and atraumatic  Eyes:      Conjunctiva/sclera: Conjunctivae normal    Cardiovascular:      Rate and Rhythm: Normal rate and regular rhythm  Heart sounds: No murmur heard  Pulmonary:      Effort: Pulmonary effort is normal  No respiratory distress  Breath sounds: Normal breath sounds  Abdominal:      Palpations: Abdomen is soft  Tenderness: There is no abdominal tenderness  Musculoskeletal:      Cervical back: Neck supple  Right lower leg: Edema present  Left lower leg: Edema present  Comments: Tenderness in the right lower lumbosacral paraspinal area   Skin:     General: Skin is warm and dry  Neurological:      General: No focal deficit present  Mental Status: He is alert and oriented to person, place, and time  Comments: Lack of insight            Additional Data:     Labs:  Results from last 7 days   Lab Units 11/04/22  0254 11/03/22  1234 11/03/22  0442   WBC Thousand/uL 17 51*   < > 15 50*   HEMOGLOBIN g/dL 8 9*   < > 9 2*   HEMATOCRIT % 26 9*   < > 27 1*   PLATELETS Thousands/uL 195   < > 180   NEUTROS PCT %  --   --  79*   LYMPHS PCT %  --   --  14   MONOS PCT %  --   --  6   EOS PCT %  --   --  0    < > = values in this interval not displayed       Results from last 7 days   Lab Units 11/04/22  0254   SODIUM mmol/L 133*   POTASSIUM mmol/L 3 7   CHLORIDE mmol/L 99   CO2 mmol/L 37*   BUN mg/dL 14 CREATININE mg/dL 0 67   ANION GAP mmol/L -3*   CALCIUM mg/dL 9 0   ALBUMIN g/dL 2 1*   TOTAL BILIRUBIN mg/dL 0 60   ALK PHOS U/L 94   ALT U/L 21   AST U/L 13   GLUCOSE RANDOM mg/dL 180*     Results from last 7 days   Lab Units 11/03/22  1234   INR  1 08     Results from last 7 days   Lab Units 11/04/22  1110 11/04/22  0711 11/04/22  0555 11/03/22  2338 11/03/22  2235 11/03/22  1653 11/03/22  1055 11/03/22  0706   POC GLUCOSE mg/dl 257* 198* 205* 213* 206* 183* 122 333*     Results from last 7 days   Lab Units 11/02/22  2349   HEMOGLOBIN A1C % 9 1*     Results from last 7 days   Lab Units 11/04/22  0254 11/03/22  0442 11/02/22  2349   LACTIC ACID mmol/L  --   --  1 1   PROCALCITONIN ng/ml 3 26* 10 74* 11 67*       Lines/Drains:  Invasive Devices  Report    Peripheral Intravenous Line  Duration           Peripheral IV 11/02/22 Right Antecubital 1 day    Peripheral IV 11/03/22 Dorsal (posterior); Left Forearm 1 day                  Telemetry:  Telemetry Orders (From admission, onward)             48 Hour Telemetry Monitoring  Continuous x 48 hours        References:    Telemetry Guidelines   Question:  Reason for 48 Hour Telemetry  Answer:  Arrhythmias Requiring Medical Therapy (eg  SVT, Vtach/fib, Bradycardia, Uncontrolled A-fib)                          Recent Cultures (last 7 days):   Results from last 7 days   Lab Units 11/02/22  2349   BLOOD CULTURE  No Growth at 24 hrs  No Growth at 24 hrs         Last 24 Hours Medication List:   Current Facility-Administered Medications   Medication Dose Route Frequency Provider Last Rate   • acetaminophen  650 mg Oral Q8H PRN Ramona Jaramillo PA-C     • heparin (porcine)  3-30 Units/kg/hr (Order-Specific) Intravenous Titrated Russell Adkins MD 20 Units/kg/hr (11/04/22 1600)   • influenza vaccine  0 5 mL Intramuscular Prior to discharge Ramona Jaramillo PA-C     • insulin lispro  1-5 Units Subcutaneous Q6H [address] Ramona Jaramillo PA-C     • lidocaine  1 patch Topical Daily Shaylee Torres MD     • nicotine  1 patch Transdermal Daily Tristan Chong PA-C     • OLANZapine  5 mg Oral HS Shaylee Torres MD     • ondansetron  4 mg Intravenous Q6H PRN Tristan Chong PA-C     • piperacillin-tazobactam  3 375 g Intravenous Q6H Tristan Chong PA-C 3 375 g (11/04/22 1641)   • senna  1 tablet Oral HS PRN Tristan Chong PA-C          Today, Patient Was Seen By: Shaylee Torres    **Please Note: This note may have been constructed using a voice recognition system  **

## 2022-11-04 NOTE — CONSULTS
Consultation - Palliative and Supportive Care   Nancy Rodriguez 58 y o  male [id number]    Patient Active Problem List   Diagnosis   • SIRS (systemic inflammatory response syndrome) (HCC)   • Suspected Primary malignant neoplasm of left lung metastatic to other site [facility])   • Abnormal CT scan, gallbladder   • Schizophrenia (HCC)   • Hypercalcemia   • Elevated glucose   • Hyponatremia   • Pedal edema     Active issues specifically addressed today include: Suspected Lung cancer metastatic to bone  Hilar metastasis  Retropulsion of bone into spinal canal, MRI pending  Pulmonary emboli  Acute DVT   Rule out acute cholecystitis  Hypercalcemia  Hyponatremia   schizophrenia    Plan:  1  Symptom management - at this time, Ena Myers is asking to be released AMA and I do not recommend sending him with opioid medications  2  Goals - On my discussion today, Ena Myers is asking to be released AMA, however he does not appear to have appropriate understanding of his diagnosis and was in fact surprised to hear he had cancer although this had been explained to him already     - I asked about biopsy or desire for treatment; again, he asks to leave AMA  - He has not appointed a POA  He is estranged from sister and nephew, his next of kin  Close friend Taiwo Hernandez provides some support as well as elder from Religious  - See [place] state law decisional hierarchy below  Kamida and [employer] elder plan to visit later today  They will talk with Ena Myers  - Per close friend Malijocelynn Hernandez, he does not believe that Ena Myers would want to undergo intense medical are such as chemo; this seems consistent with Joe's report as well  - ongoing goals discussions are warranted    Palliative will follow, available in person again 11/7     3  Per [place] 169, decisional hierarchy is as follows, in decreasing order of authority:   1st - Current spouse (unless one of the parties has filed for divorce), PLUS any and all children from previous union(s) - none reported  2nd - All adult child(tami) from any union   3rd - Parent(s), either by blood or legal adoption    4th - Adult sibling(s), either by blood or legal adoption    5th - Adult grandchild(tami) from any union    6th - Any adult who is familiar with pt's wishes, desires, concerns, and/or medical      Code Status: full code - Level 1   Decisional apparatus:  Patient is not competent on my exam today  If competence is lost, patient's substitute decision maker would default to close friends by [place] 169  Advance Directive / Living Will / POLST:  none     I have reviewed the patient's controlled substance dispensing history in the Prescription Drug Monitoring Program in compliance with the [facility] regulations before prescribing any controlled substances  We appreciate the invitation to be involved in this patient's care  We will follow  Please do not hesitate to reach our on call provider through our clinic answering service at  should you have acute symptom control concerns  [facility] Service: [phone]  You can find me on [employer]! IDENTIFICATION:  Inpatient consult to Palliative Care  Consult performed by: Nasir Ferreira PA-C  Consult ordered by: Winnie Tavarez PA-C        Physician Requesting Consult: Phoebe Borrego*  Reason for Consult / Principal Problem: cancer  Hx and PE limited by: patient not reliable historian     HISTORY OF PRESENT ILLNESS:       Fernando Patel is a 58 y o  male who presents with generalized weakness and fatigue  Rashi Banks has a past medical history of schizophrenia and medical noncompliance  He was brought in by paramedics due to psychosis  On presentation, he was dehydrated and tachycardic  ED team initiated infection workup with CT scan  This revealed left lower lobe mass with lytic lesions of 6 rib and pelvic bones    Palliative and supportive care consulted to clarify goals of care  Herve Bocanegra at bedside on 11/04/2022  I introduced myself  He keeps requesting to be released from the hospital   I asked questions to assess his understanding of his current medical condition  He states he has "mucus" in his lungs and does not believe that he has cancer  When I explained that it has also spread to his bones and we would like a biopsy, he seems shocked  He asked to be released  He says he would be willing to see a cancer doctor after discharge  He does not seem to understand that biopsy is necessary to initiate any treatment  When I ask if he would be interested in treatment for cancer, he requests to be released from the hospital and is not willing to engage further in my discussion  He denies having any power of  and is suspicious when I asked these questions  He denies having any siblings or anyone who helps make decisions for him  I did give a courtesy call to his emergency contact Pavel Hernandez confirms that Lita Jaimes does not have a POA and is not the type of person to do paperwork like that  Taiwo Hernandez believe Lita Jaimes has a sister but she is not involved in his life  He has a nephew but knows Lita Jaimes does not want to see him and that they are estranged  Taiwo Hernandez has heard Lita Jaimes say things like I wish I could just go home but he has required hospitalizations in the past   He does not believe see been would want aggressive care for his cancer  He understands at this time we are concerned that Lita Jaimes is not completely understanding our medical recommendations and he will try to his hospital later today and speak with him  Review of Systems   Unable to perform ROS: psychiatric disorder       Past Medical History:   Diagnosis Date   • Bladder infection      History reviewed  No pertinent surgical history    Social History     Socioeconomic History   • Marital status: Single     Spouse name: Not on file   • Number of children: Not on file   • Years of education: Not on file   • Highest education level: Not on file   Occupational History   • Not on file   Tobacco Use   • Smoking status: Current Every Day Smoker     Packs/day: 1 00     Types: Cigarettes   • Smokeless tobacco: Never Used   Vaping Use   • Vaping Use: Never used   Substance and Sexual Activity   • Alcohol use: Not Currently   • Drug use: Never   • Sexual activity: Not on file   Other Topics Concern   • Not on file   Social History Narrative   • Not on file     Social Determinants of Health     Financial Resource Strain: Not on file   Food Insecurity: No Food Insecurity   • Worried About Running Out of Food in the Last Year: Never true   • Ran Out of Food in the Last Year: Never true   Transportation Needs: No Transportation Needs   • Lack of Transportation (Medical): No   • Lack of Transportation (Non-Medical): No   Physical Activity: Not on file   Stress: Not on file   Social Connections: Not on file   Intimate Partner Violence: Not on file   Housing Stability: Low Risk    • Unable to Pay for Housing in the Last Year: No   • Number of Places Lived in the Last Year: 1   • Unstable Housing in the Last Year: No     History reviewed  No pertinent family history      MEDICATIONS / ALLERGIES:    all current active meds have been reviewed, current meds:   Current Facility-Administered Medications   Medication Dose Route Frequency   • acetaminophen (TYLENOL) tablet 650 mg  650 mg Oral Q8H PRN   • heparin (porcine) 25,000 units in 0 45% NaCl 250 mL infusion (premix)  3-30 Units/kg/hr (Order-Specific) Intravenous Titrated   • influenza vaccine, recombinant, quadrivalent (FLUBLOK) IM injection 0 5 mL  0 5 mL Intramuscular Prior to discharge   • insulin lispro (HumaLOG) 100 units/mL subcutaneous injection 1-5 Units  1-5 Units Subcutaneous Q6H [facility]   • lidocaine (LIDODERM) 5 % patch 1 patch  1 patch Topical Daily   • multi-electrolyte (PLASMALYTE-A/ISOLYTE-S PH 7 4) IV solution  75 mL/hr Intravenous Continuous   • nicotine (NICODERM CQ) 21 mg/24 hr TD 24 hr patch 1 patch  1 patch Transdermal Daily   • OLANZapine (ZyPREXA) tablet 5 mg  5 mg Oral HS   • ondansetron (ZOFRAN) injection 4 mg  4 mg Intravenous Q6H PRN   • piperacillin-tazobactam (ZOSYN) IVPB 3 375 g  3 375 g Intravenous Q6H   • senna (SENOKOT) tablet 8 6 mg  1 tablet Oral HS PRN    and PTA meds:   Prior to Admission Medications   Prescriptions Last Dose Informant Patient Reported? Taking? cloZAPine (CLOZARIL) 100 mg tablet Not Taking at Unknown time  No No   Sig: Take 1 tablet (100 mg total) by mouth daily at bedtime   Patient not taking: Reported on 11/3/2022      Facility-Administered Medications: None       No Known Allergies    OBJECTIVE:     Physical Exam  Vitals and nursing note reviewed  Constitutional:       Appearance: He is well-developed  He is ill-appearing  HENT:      Head: Normocephalic and atraumatic  Eyes:      Conjunctiva/sclera: Conjunctivae normal    Pulmonary:      Effort: Pulmonary effort is normal    Abdominal:      Tenderness: There is no abdominal tenderness  Musculoskeletal:      Cervical back: Neck supple  Skin:     General: Skin is warm and dry  Coloration: Skin is pale  Neurological:      Mental Status: He is alert  Psychiatric:         Mood and Affect: Affect is angry  Cognition and Memory: Cognition is impaired  Judgment: Judgment is impulsive  Lab Results:   I have personally reviewed pertinent labs  , CBC:   Lab Results   Component Value Date    WBC 17 51 (H) 11/04/2022    HGB 8 9 (L) 11/04/2022    HCT 26 9 (L) 11/04/2022    MCV 92 11/04/2022     11/04/2022    MCH 30 4 11/04/2022    MCHC 33 1 11/04/2022    RDW 15 8 (H) 11/04/2022    MPV 9 5 11/04/2022   , CMP:   Lab Results   Component Value Date    SODIUM 133 (L) 11/04/2022    K 3 7 11/04/2022    CL 99 11/04/2022    CO2 37 (H) 11/04/2022    BUN 14 11/04/2022    CREATININE 0 67 11/04/2022    CALCIUM 9 0 11/04/2022    AST 13 11/04/2022    ALT 21 11/04/2022    ALKPHOS 94 11/04/2022    EGFR 102 11/04/2022     Imaging Studies: CBC, CMP  EKG, Pathology, and Other Studies: CXRAY    Counseling / Coordination of Care    Total floor / unit time spent today 60 minutes  Greater than 50% of total time was spent with the patient and / or family counseling and / or coordination of care  A description of the counseling / coordination of care:  Discussed medical updates, discussed palliative in symptom management, discussed psychosocial support, discussed family and friend support, discussed competency in decision making, discussed POA and advance care planning

## 2022-11-04 NOTE — PLAN OF CARE
Problem: Nutrition/Hydration-ADULT  Goal: Nutrient/Hydration intake appropriate for improving, restoring or maintaining nutritional needs  Description: Monitor and assess patient's nutrition/hydration status for malnutrition  Collaborate with interdisciplinary team and initiate plan and interventions as ordered  Monitor patient's weight and dietary intake as ordered or per policy  Utilize nutrition screening tool and intervene as necessary  Determine patient's food preferences and provide high-protein, high-caloric foods as appropriate       INTERVENTIONS:  - Monitor oral intake, urinary output, labs, and treatment plans  - Assess nutrition and hydration status and recommend course of action  - Evaluate amount of meals eaten  - Assist patient with eating if necessary   - Allow adequate time for meals  - Recommend/ encourage appropriate diets, oral nutritional supplements, and vitamin/mineral supplements  - Order, calculate, and assess calorie counts as needed  - Recommend, monitor, and adjust tube feedings and TPN/PPN based on assessed needs  - Assess need for intravenous fluids  - Provide specific nutrition/hydration education as appropriate  - Include patient/family/caregiver in decisions related to nutrition  Outcome: Progressing     Problem: MOBILITY - ADULT  Goal: Maintain or return to baseline ADL function  Description: INTERVENTIONS:  -  Assess patient's ability to carry out ADLs; assess patient's baseline for ADL function and identify physical deficits which impact ability to perform ADLs (bathing, care of mouth/teeth, toileting, grooming, dressing, etc )  - Assess/evaluate cause of self-care deficits   - Assess range of motion  - Assess patient's mobility; develop plan if impaired  - Assess patient's need for assistive devices and provide as appropriate  - Encourage maximum independence but intervene and supervise when necessary  - Involve family in performance of ADLs  - Assess for home care needs following discharge   - Consider OT consult to assist with ADL evaluation and planning for discharge  - Provide patient education as appropriate  Outcome: Progressing  Goal: Maintains/Returns to pre admission functional level  Description: INTERVENTIONS:  - Perform BMAT or MOVE assessment daily    - Set and communicate daily mobility goal to care team and patient/family/caregiver  - Collaborate with rehabilitation services on mobility goals if consulted  - Perform Range of Motion 3 times a day  - Reposition patient every 3 hours    - Dangle patient 3 times a day  - Stand patient 3 times a day  - Ambulate patient 3 times a day  - Out of bed to chair 3 times a day   - Out of bed for meals 3 times a day  - Out of bed for toileting  - Record patient progress and toleration of activity level   Outcome: Progressing     Problem: Prexisting or High Potential for Compromised Skin Integrity  Goal: Skin integrity is maintained or improved  Description: INTERVENTIONS:  - Identify patients at risk for skin breakdown  - Assess and monitor skin integrity  - Assess and monitor nutrition and hydration status  - Monitor labs   - Assess for incontinence   - Turn and reposition patient  - Assist with mobility/ambulation  - Relieve pressure over bony prominences  - Avoid friction and shearing  - Provide appropriate hygiene as needed including keeping skin clean and dry  - Evaluate need for skin moisturizer/barrier cream  - Collaborate with interdisciplinary team   - Patient/family teaching  - Consider wound care consult   Outcome: Progressing     Problem: PAIN - ADULT  Goal: Verbalizes/displays adequate comfort level or baseline comfort level  Description: Interventions:  - Encourage patient to monitor pain and request assistance  - Assess pain using appropriate pain scale  - Administer analgesics based on type and severity of pain and evaluate response  - Implement non-pharmacological measures as appropriate and evaluate response  - Consider cultural and social influences on pain and pain management  - Notify physician/advanced practitioner if interventions unsuccessful or patient reports new pain  Outcome: Progressing     Problem: INFECTION - ADULT  Goal: Absence or prevention of progression during hospitalization  Description: INTERVENTIONS:  - Assess and monitor for signs and symptoms of infection  - Monitor lab/diagnostic results  - Monitor all insertion sites, i e  indwelling lines, tubes, and drains  - Monitor endotracheal if appropriate and nasal secretions for changes in amount and color  - [place] appropriate cooling/warming therapies per order  - Administer medications as ordered  - Instruct and encourage patient and family to use good hand hygiene technique  - Identify and instruct in appropriate isolation precautions for identified infection/condition  Outcome: Progressing  Goal: Absence of fever/infection during neutropenic period  Description: INTERVENTIONS:  - Monitor WBC    Outcome: Progressing     Problem: SAFETY ADULT  Goal: Maintain or return to baseline ADL function  Description: INTERVENTIONS:  -  Assess patient's ability to carry out ADLs; assess patient's baseline for ADL function and identify physical deficits which impact ability to perform ADLs (bathing, care of mouth/teeth, toileting, grooming, dressing, etc )  - Assess/evaluate cause of self-care deficits   - Assess range of motion  - Assess patient's mobility; develop plan if impaired  - Assess patient's need for assistive devices and provide as appropriate  - Encourage maximum independence but intervene and supervise when necessary  - Involve family in performance of ADLs  - Assess for home care needs following discharge   - Consider OT consult to assist with ADL evaluation and planning for discharge  - Provide patient education as appropriate  Outcome: Progressing  Goal: Maintains/Returns to pre admission functional level  Description: INTERVENTIONS:  - Perform BMAT or MOVE assessment daily    - Set and communicate daily mobility goal to care team and patient/family/caregiver  - Collaborate with rehabilitation services on mobility goals if consulted  - Perform Range of Motion 3 times a day  - Reposition patient every 3 hours    - Dangle patient 3 times a day  - Stand patient 3 times a day  - Ambulate patient 3 times a day  - Out of bed to chair 3 times a day   - Out of bed for meals 3 times a day  - Out of bed for toileting  - Record patient progress and toleration of activity level   Outcome: Progressing  Goal: Patient will remain free of falls  Description: INTERVENTIONS:  - Educate patient/family on patient safety including physical limitations  - Instruct patient to call for assistance with activity   - Consult OT/PT to assist with strengthening/mobility   - Keep Call bell within reach  - Keep bed low and locked with side rails adjusted as appropriate  - Keep care items and personal belongings within reach  - Initiate and maintain comfort rounds  - Make Fall Risk Sign visible to staff  - Offer Toileting every  Hours, in advance of need  - Initiate/Maintain bedalarm  - Obtain necessary fall risk management equipment:   - Apply yellow socks and bracelet for high fall risk patients  - Consider moving patient to room near nurses station  Outcome: Progressing     Problem: DISCHARGE PLANNING  Goal: Discharge to home or other facility with appropriate resources  Description: INTERVENTIONS:  - Identify barriers to discharge w/patient and caregiver  - Arrange for needed discharge resources and transportation as appropriate  - Identify discharge learning needs (meds, wound care, etc )  - Arrange for interpretive services to assist at discharge as needed  - Refer to Case Management Department for coordinating discharge planning if the patient needs post-hospital services based on physician/advanced practitioner order or complex needs related to functional status, cognitive ability, or social support system  Outcome: Progressing     Problem: Knowledge Deficit  Goal: Patient/family/caregiver demonstrates understanding of disease process, treatment plan, medications, and discharge instructions  Description: Complete learning assessment and assess knowledge base    Interventions:  - Provide teaching at level of understanding  - Provide teaching via preferred learning methods  Outcome: Progressing     Problem: Potential for Falls  Goal: Patient will remain free of falls  Description: INTERVENTIONS:  - Educate patient/family on patient safety including physical limitations  - Instruct patient to call for assistance with activity   - Consult OT/PT to assist with strengthening/mobility   - Keep Call bell within reach  - Keep bed low and locked with side rails adjusted as appropriate  - Keep care items and personal belongings within reach  - Initiate and maintain comfort rounds  - Make Fall Risk Sign visible to staff  - Offer Toileting every 2 Hours, in advance of need  - Initiate/Maintain bedalarm  - Obtain necessary fall risk management equipment:   - Apply yellow socks and bracelet for high fall risk patients  - Consider moving patient to room near nurses station  Outcome: Progressing

## 2022-11-04 NOTE — TELEMEDICINE
e-Consult (IPC)  - Interventional Radiology  Christiano Mcmanus 58 y o  male MRN: [id number]  Unit/Bed#: -01 Encounter: [id number]          Interventional Radiology has been consulted to evaluate Christiano Mcmanus    We were consulted by Dr Lennon [place] concerning this patient with metastatic disease  IP Consult to IR  Consult performed by: Bouchra Steele DO  Consult ordered by: Kay Castillo MD        11/04/22    Assessment/Recommendation:   58 y M a/w fatigue w/ CT findings c/f presumed metastatic lung CA  Request for biopsy  Likely Tuesday of next week  Consider targeting L iliac crest bone lesion  NPO for Tue  Need heparin gtt hold of 4 hours  5-10 minutes, >50% of the total time devoted to medical consultative verbal/EMR discussion between providers  Written report will be generated in the EMR  Thank you for allowing Interventional Radiology to participate in the care of Christiano Mcmanus  Please don't hesitate to call or TigerText us with any questions       Bouchra Steele DO

## 2022-11-04 NOTE — CONSULTS
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 and found to have bilateral DVTs and a few small emboli in the right lung  Patient was also found to have a distended gallbladder with sludge with no edema or stones  On a RUQ ultrasound  Currently patient denies any abdominal pain, nausea, vomiting, diarrhea, or constipation  He also denies f/c and any prior episodes of RUQ abdominal pain  Inpatient consult to Acute Care Surgery  Consult performed by: Hernan Kenyon PA-C  Consult ordered by: Jenny Asher PA-C          Review of Systems   Constitutional: Negative for chills, fatigue and fever  HENT: Negative for congestion, ear pain, hearing loss, postnasal drip, sinus pressure and sore throat  Eyes: Negative for pain, redness and visual disturbance  Respiratory: Negative for cough, chest tightness, shortness of breath and wheezing  Cardiovascular: Negative for chest pain, palpitations and leg swelling  Gastrointestinal: Negative for abdominal distention, abdominal pain, constipation, diarrhea, nausea and vomiting  Genitourinary: Negative for difficulty urinating, dysuria, frequency, hematuria and urgency  Musculoskeletal: Negative for arthralgias, back pain, joint swelling and myalgias  Skin: Negative for rash and wound  Neurological: Negative for dizziness, weakness, numbness and headaches  Hematological: Negative for adenopathy  Psychiatric/Behavioral: Negative for dysphoric mood  The patient is not nervous/anxious  Historical Information   Past Medical History:   Diagnosis Date   • Bladder infection      History reviewed  No pertinent surgical history    Social History   Social History     Substance and Sexual Activity   Alcohol Use Not Currently     Social History     Substance and Sexual Activity   Drug Use Never     Social History     Tobacco Use   Smoking Status Current Every Day Smoker   • Packs/day: 1 00   • Types: Cigarettes   Smokeless Tobacco Never Used     Family History: History reviewed  No pertinent family history      Meds/Allergies   Current Facility-Administered Medications   Medication Dose Route Frequency   • acetaminophen (TYLENOL) tablet 650 mg  650 mg Oral Q8H PRN   • heparin (porcine) 25,000 units in 0 45% NaCl 250 mL infusion (premix)  3-30 Units/kg/hr (Order-Specific) Intravenous Titrated   • influenza vaccine, recombinant, quadrivalent (FLUBLOK) IM injection 0 5 mL  0 5 mL Intramuscular Prior to discharge   • insulin lispro (HumaLOG) 100 units/mL subcutaneous injection 1-5 Units  1-5 Units Subcutaneous Q6H [address]   • lidocaine (LIDODERM) 5 % patch 1 patch  1 patch Topical Daily   • multi-electrolyte (PLASMALYTE-A/ISOLYTE-S PH 7 4) IV solution  75 mL/hr Intravenous Continuous   • nicotine (NICODERM CQ) 21 mg/24 hr TD 24 hr patch 1 patch  1 patch Transdermal Daily   • OLANZapine (ZyPREXA) tablet 5 mg  5 mg Oral HS   • ondansetron (ZOFRAN) injection 4 mg  4 mg Intravenous Q6H PRN   • piperacillin-tazobactam (ZOSYN) IVPB 3 375 g  3 375 g Intravenous Q6H   • senna (SENOKOT) tablet 8 6 mg  1 tablet Oral HS PRN     Medications Prior to Admission   Medication   • cloZAPine (CLOZARIL) 100 mg tablet     No Known Allergies    Objective   First Vitals:   Blood Pressure: (!) 171/90 (11/02/22 2116)  Pulse: (!) 116 (11/02/22 2116)  Temperature: 98 6 °F (37 °C) (11/02/22 2116)  Temp Source: Temporal (11/02/22 2116)  Respirations: 18 (11/02/22 2116)  Height: 5' 9" (175 3 cm) (11/02/22 2116)  Weight - Scale: 72 6 kg (160 lb) (11/02/22 2116)  SpO2: 100 % (11/02/22 2116)    Current Vitals:   Blood Pressure: 136/69 (11/04/22 0715)  Pulse: (!) 109 (11/04/22 0715)  Temperature: 98 2 °F (36 8 °C) (11/04/22 0715)  Temp Source: Oral (11/04/22 0715)  Respirations: 20 (11/03/22 1310)  Height: 5' 9" (175 3 cm) (11/02/22 2116)  Weight - Scale: 72 6 kg (160 lb) (11/02/22 2116)  SpO2: 95 % (11/04/22 0715)      Intake/Output Summary (Last 24 hours) at 11/4/2022 0951  Last data filed at 11/4/2022 0914  Gross per 24 hour   Intake 1800 ml   Output 2050 ml   Net -250 ml       Invasive Devices  Report    Peripheral Intravenous Line  Duration           Peripheral IV 11/02/22 Right Antecubital 1 day    Peripheral IV 11/03/22 Dorsal (posterior); Left Forearm <1 day                Physical Exam  Vitals reviewed  Constitutional:       Appearance: He is well-developed  HENT:      Head: Normocephalic and atraumatic  Eyes:      Pupils: Pupils are equal, round, and reactive to light  Cardiovascular:      Rate and Rhythm: Normal rate and regular rhythm  Heart sounds: No murmur heard  Pulmonary:      Breath sounds: Normal breath sounds  No wheezing or rales  Abdominal:      General: Bowel sounds are normal  There is no distension  Palpations: Abdomen is soft  Tenderness: There is no abdominal tenderness  There is no guarding or rebound  Comments: NT RUQ, +BS   Genitourinary:     Comments: deferred  Musculoskeletal:         General: Normal range of motion  Cervical back: Neck supple  Lymphadenopathy:      Cervical: No cervical adenopathy  Skin:     General: Skin is warm  Findings: No erythema or rash  Neurological:      Mental Status: He is alert and oriented to person, place, and time  Cranial Nerves: No cranial nerve deficit  Psychiatric:      Comments:  Thoughts jump around, abnormal responses at times           Lab Results:   Admission on 11/02/2022   Component Date Value   • Ventricular Rate 11/02/2022 114    • Atrial Rate 11/02/2022 114    • PA Interval 11/02/2022 132    • QRSD Interval 11/02/2022 108    • QT Interval 11/02/2022 316    • QTC Interval 11/02/2022 435    • P Axis 11/02/2022 70    • QRS Axis 11/02/2022 78    • T Wave Axis 11/02/2022 72    • WBC 11/02/2022 17 36 (A)   • RBC 11/02/2022 3 90    • Hemoglobin 11/02/2022 12 1    • Hematocrit 11/02/2022 35 3 (A)   • MCV 11/02/2022 91    • MCH 11/02/2022 31 0    • MCHC 11/02/2022 34 3    • RDW 11/02/2022 15 5 (A)   • MPV 11/02/2022 9 6    • Platelets 98/57/8062 258    • nRBC 11/02/2022 0    • Neutrophils Relative 11/02/2022 73    • Immat GRANS % 11/02/2022 2    • Lymphocytes Relative 11/02/2022 19    • Monocytes Relative 11/02/2022 5    • Eosinophils Relative 11/02/2022 1    • Basophils Relative 11/02/2022 0    • Neutrophils Absolute 11/02/2022 12 72 (A)   • Immature Grans Absolute 11/02/2022 0 26 (A)   • Lymphocytes Absolute 11/02/2022 3 35    • Monocytes Absolute 11/02/2022 0 91    • Eosinophils Absolute 11/02/2022 0 08    • Basophils Absolute 11/02/2022 0 04    • Sodium 11/02/2022 129 (A)   • Potassium 11/02/2022 3 7    • Chloride 11/02/2022 91 (A)   • CO2 11/02/2022 32    • ANION GAP 11/02/2022 6    • BUN 11/02/2022 23    • Creatinine 11/02/2022 0 78    • Glucose 11/02/2022 233 (A)   • Calcium 11/02/2022 10 6 (A)   • Corrected Calcium 11/02/2022 11 5 (A)   • AST 11/02/2022 17    • ALT 11/02/2022 31    • Alkaline Phosphatase 11/02/2022 132 (A)   • Total Protein 11/02/2022 7 6    • Albumin 11/02/2022 2 9 (A)   • Total Bilirubin 11/02/2022 1 10 (A)   • eGFR 11/02/2022 96    • LACTIC ACID 11/02/2022 1 1    • Procalcitonin 11/02/2022 11 67 (A)   • Protime 11/02/2022 13 9    • INR 11/02/2022 1 00    • PTT 11/02/2022 29    • Blood Culture 11/02/2022 No Growth at 24 hrs  • Blood Culture 11/02/2022 No Growth at 24 hrs      • Color, UA 11/03/2022 Yellow    • Clarity, UA 11/03/2022 Clear    • Specific [place], UA 11/03/2022 1 015    • pH, UA 11/03/2022 6 0    • Leukocytes, UA 11/03/2022 Negative    • Nitrite, UA 11/03/2022 Negative    • Protein, UA 11/03/2022 100 (2+) (A)   • Glucose, UA 11/03/2022 150 (3/20%) (A)   • Ketones, UA 11/03/2022 10 (1+) (A)   • Urobilinogen, UA 11/03/2022 <2 0    • Bilirubin, UA 11/03/2022 Negative    • Occult Blood, UA 11/03/2022 Small (A)   • SARS-CoV-2 11/02/2022 Negative    • INFLUENZA A PCR 11/02/2022 Negative    • INFLUENZA B PCR 11/02/2022 Negative    • RSV PCR 11/02/2022 Negative    • RBC, UA 11/03/2022 2-4    • WBC, UA 11/03/2022 1-2    • Epithelial Cells 11/03/2022 Occasional    • Bacteria, UA 11/03/2022 None Seen    • Hemoglobin A1C 11/02/2022 9 1 (A)   • EAG 11/02/2022 214    • WBC 11/03/2022 15 50 (A)   • RBC 11/03/2022 2 99 (A)   • Hemoglobin 11/03/2022 9 2 (A)   • Hematocrit 11/03/2022 27 1 (A)   • MCV 11/03/2022 91    • MCH 11/03/2022 30 8    • MCHC 11/03/2022 33 9    • RDW 11/03/2022 15 4 (A)   • MPV 11/03/2022 9 6    • Platelets 25/76/0500 180    • nRBC 11/03/2022 0    • Neutrophils Relative 11/03/2022 79 (A)   • Immat GRANS % 11/03/2022 1    • Lymphocytes Relative 11/03/2022 14    • Monocytes Relative 11/03/2022 6    • Eosinophils Relative 11/03/2022 0    • Basophils Relative 11/03/2022 0    • Neutrophils Absolute 11/03/2022 12 15 (A)   • Immature Grans Absolute 11/03/2022 0 21 (A)   • Lymphocytes Absolute 11/03/2022 2 11    • Monocytes Absolute 11/03/2022 0 94    • Eosinophils Absolute 11/03/2022 0 06    • Basophils Absolute 11/03/2022 0 03    • Sodium 11/03/2022 132 (A)   • Potassium 11/03/2022 3 2 (A)   • Chloride 11/03/2022 97    • CO2 11/03/2022 32    • ANION GAP 11/03/2022 3 (A)   • BUN 11/03/2022 19    • Creatinine 11/03/2022 0 71    • Glucose 11/03/2022 287 (A)   • Calcium 11/03/2022 8 8    • Corrected Calcium 11/03/2022 10 3 (A)   • AST 11/03/2022 16    • ALT 11/03/2022 22    • Alkaline Phosphatase 11/03/2022 94    • Total Protein 11/03/2022 5 5 (A)   • Albumin 11/03/2022 2 1 (A)   • Total Bilirubin 11/03/2022 0 80    • eGFR 11/03/2022 100    • Protime 11/03/2022 14 8 (A)   • INR 11/03/2022 1 08    • Magnesium 11/03/2022 1 5 (A)   • Phosphorus 11/03/2022 3 3    • Procalcitonin 11/03/2022 10 74 (A)   • Calcium, Ionized 11/03/2022 1 18    • POC Glucose 11/03/2022 333 (A)   • POC Glucose 11/03/2022 122    • PTT 11/03/2022 34    • WBC 11/03/2022 20 09 (A)   • RBC 11/03/2022 3 09 (A)   • Hemoglobin 11/03/2022 9 5 (A)   • Hematocrit 11/03/2022 28 0 (A)   • MCV 11/03/2022 91    • MCH 11/03/2022 30 7    • MCHC 11/03/2022 33 9    • RDW 11/03/2022 15 7 (A)   • Platelets 09/92/4716 201    • MPV 11/03/2022 9 4    • Protime 11/03/2022 14 8 (A)   • INR 11/03/2022 1 08    • Iron Saturation 11/03/2022 43    • TIBC 11/03/2022 188 (A)   • Iron 11/03/2022 80    • Ferritin 11/03/2022 1,189 (A)   • PTT 11/03/2022 74 (A)   • POC Glucose 11/03/2022 183 (A)   • POC Glucose 11/03/2022 206 (A)   • POC Glucose 11/03/2022 213 (A)   • PTT 11/04/2022 52 (A)   • NT-proBNP 11/04/2022 841 (A)   • HS TnI random 11/04/2022 119 (A)   • WBC 11/04/2022 17 51 (A)   • RBC 11/04/2022 2 93 (A)   • Hemoglobin 11/04/2022 8 9 (A)   • Hematocrit 11/04/2022 26 9 (A)   • MCV 11/04/2022 92    • MCH 11/04/2022 30 4    • MCHC 11/04/2022 33 1    • RDW 11/04/2022 15 8 (A)   • Platelets 55/91/3838 195    • MPV 11/04/2022 9 5    • Sodium 11/04/2022 133 (A)   • Potassium 11/04/2022 3 7    • Chloride 11/04/2022 99    • CO2 11/04/2022 37 (A)   • ANION GAP 11/04/2022 -3 (A)   • BUN 11/04/2022 14    • Creatinine 11/04/2022 0 67    • Glucose 11/04/2022 180 (A)   • Calcium 11/04/2022 9 0    • Corrected Calcium 11/04/2022 10 5 (A)   • AST 11/04/2022 13    • ALT 11/04/2022 21    • Alkaline Phosphatase 11/04/2022 94    • Total Protein 11/04/2022 5 6 (A)   • Albumin 11/04/2022 2 1 (A)   • Total Bilirubin 11/04/2022 0 60    • eGFR 11/04/2022 102    • Magnesium 11/04/2022 1 8    • Phosphorus 11/04/2022 3 2    • Procalcitonin 11/04/2022 3 26 (A)   • POC Glucose 11/04/2022 205 (A)   • POC Glucose 11/04/2022 198 (A)     Imaging: I have personally reviewed pertinent reports       US right upper quadrant  Narrative: RIGHT UPPER QUADRANT ULTRASOUND    INDICATION:     distended gallbladder, elevated tbili and alk phos  COMPARISON:  11/3/2022    TECHNIQUE:   Real-time ultrasound of the right upper quadrant was performed with a curvilinear transducer with both volumetric sweeps and still imaging techniques  FINDINGS:    PANCREAS:  There is mild prominence of the pancreatic duct which is likely related to partial divisum on CT  AORTA AND IVC:  Visualized portions are normal for patient age  LIVER:  Size:  Moderately enlarged  The liver measures 21 0 cm in the midclavicular line  Contour:  Surface contour is smooth  Parenchyma:  Echogenicity and echotexture are within normal limits  No liver mass identified  Limited imaging of the main portal vein shows it to be patent and hepatopetal      BILIARY:  The gallbladder is distended  Wall is top normal in caliber though without wall edema  There is trace pericholecystic fluid  There is layering sludge without evidence for shadowing calculi  Sonographic Emerson's sign cannot be accurately assessed because patient had recent pain medication administration, thus limiting ultrasound assessment of acute cholecystitis  No intrahepatic biliary dilatation  CBD measures 5 0 mm  No choledocholithiasis  KIDNEY:   Right kidney measures 11 5 x 6 1 x 6 1 cm  Volume 224 8 mL  Kidney within normal limits  ASCITES:   None  Impression: Distended gallbladder with sludge, top normal wall and trace pericholecystic fluid though no wall edema or stones and Emerson's sign could not be assessed due to medication  These findings may represent acute cholecystitis in the appropriate clinical   setting though HIDA scan can be performed for confirmation of clinically indicated  The study was marked in [facility] for immediate notification      Workstation performed: [id number]  CTA chest pe study  Narrative: CTA - CHEST WITH IV CONTRAST - PULMONARY ANGIOGRAM    INDICATION:   b/l DVTs, new lung cancer, looking for PE but also further staging of malignancy  COMPARISON: CXR 11/2/2022 and abdomen CT 11/3/2022  Small pleural effusions, larger on the left  TECHNIQUE: CT angiogram timed for optimal opacification of the pulmonary arteries  Axial, sagittal, and coronal 2D reformats created from source data  Coronal 3D MIP postprocessing on the acquisition scanner  Radiation dose length product (DLP):  315 28 mGy-cm   Radiation dose exposure minimized using iterative reconstruction and automated exposure control  IV Contrast:  85 mL of iohexol (OMNIPAQUE)     FINDINGS:    PULMONARY ARTERIES:  A few small subsegmental emboli in the right middle lobe, right lower lobe, and inferior lingula, marked on series 2  LUNGS:  3 6 cm cavitary thick-walled mass in the right upper lobe (3/31)  2 8 cm necrotic nodule in the posterior basal left lower lobe (3/69)  Mild emphysema  Minimal dependent atelectasis in the lower lobes  AIRWAYS: No significant filling defects  PLEURA:  Small effusions, greater on the left  HEART/GREAT VESSELS:  Normal heart size  RV/LV diameter ratio less than 1 with nothing to indicate right heart strain  Mild coronary artery calcification indicating atherosclerotic heart disease  Trace pericardial effusion  MEDIASTINUM AND JAYESH:  Ill-defined infiltration of the mediastinal fat  Enlarged left hilar node with a short axis of 1 6 cm  CHEST Unremarkable  R NECK: Unremarkable  UPPER ABDOMEN:  Persistent gallbladder distention, incompletely imaged  OSSEOUS STRUCTURES:  Lytic metastasis to the left 5th rib posteriorly and laterally and to the lateral 6th rib  Question metastatic disease to the left posterior 8th rib at the costovertebral junction  Probable sclerotic metastasis to the lateral right   6th rib  Moderate compression deformities of T6 and T8, likely due to metastatic disease, with mild retropulsion of bone into the spinal canal at the level of T8    Impression: A few small subsegmental emboli in the right middle lobe, right lower lobe, and inferior lingula  3 6 cm thick walled cavitary right upper lobe tumor and 2 8 cm necrotic left lower lobe tumor  Ill-defined infiltration of the mediastinal fat likely due to tumor with enlarged left hilar node, likely metastatic  Small effusions  Metastatic disease to bilateral ribs and T6 and T8 with severe compression deformities  Given mild retropulsion of bone into the spinal canal at the level of T8, recommend further evaluation with MRI to evaluate for cord compression  Persistent gallbladder distention, incompletely imaged  I notified Mendel Rao on 11/3/2022 at 8:51 PM by secure text and she responded immediately  Workstation performed: [id number]  VAS lower limb venous duplex study, complete bilateral     [facility] REPORT  CLINICAL:  Indications: Patient presents with bilateral pedal edema x  1 week  FINDINGS:     Segment        Right                   Left                            Impression              Impression                Gastrocnemius  Occlusive Subsegmental  Occlusive Subsegmental    Calf Veins     Occlusive Subsegmental                                     CONCLUSION:     Impression:  RIGHT LOWER LIMB:  Evaluation shows acute occlusive thrombus in the gastrocnemius veins from  behind the knee to proximal calf and in the soleal vein from proximal to mid  calf  No evidence of superficial thrombophlebitis noted  Doppler evaluation shows a normal response to augmentation maneuvers  Popliteal, posterior tibial and anterior tibial arterial Doppler waveforms are  triphasic  LEFT LOWER LIMB:  Evaluation shows acute occlusive thrombus in the gastrocnemius veins from  behind the knee to proximal calf  No evidence of superficial thrombophlebitis noted  Doppler evaluation shows a normal response to augmentation maneuvers    Popliteal, posterior tibial and anterior tibial arterial Doppler waveforms are  triphasic  Technical findings were given to GALEN Knox on 11/3/2022 @ 1045  SIGNATURE:  Electronically Signed by: Ainsley Stubbs on 2022-11-03 02:12:15 PM  XR chest portable  Narrative: CHEST     INDICATION:   Cough, tachycardia, weakness  COMPARISON:  Abdomen CT 11/3/2022, CXR 3/20/2006  EXAM PERFORMED/VIEWS:  XR CHEST PORTABLE    FINDINGS:    Cardiomediastinal silhouette appears unremarkable  2 6 cm solid left lower lobe mass corresponding with the abdomen CT   4 cm cavitary right upper lobe mass  No acute disease  No effusion or pneumothorax  Left lateral 6th rib metastasis corresponding with the abdomen CT probable posterior left 5th rib metastasis  Impression: Right upper lobe cavitary mass, left lower lobe mass, and left rib metastases, some which are visible on the abdomen CT  Findings on the abdomen CT were reported to the emergency physician by Dr Lamon Curling  Workstation performed: [id number]  CT abdomen pelvis with contrast  Narrative: CT ABDOMEN AND PELVIS WITH IV CONTRAST    INDICATION:   Sepsis  Sepsis rule out infection  Poor historian  COMPARISON:  CT of abdomen pelvis on March 20, 2006  TECHNIQUE:  CT examination of the abdomen and pelvis was performed  Axial, sagittal, and coronal 2D reformatted images were created from the source data and submitted for interpretation  Radiation dose length product (DLP) for this visit:  487 6 mGy-cm   This examination, like all CT scans performed in the [facility], was performed utilizing techniques to minimize radiation dose exposure, including the use of iterative   reconstruction and automated exposure control  IV Contrast:  100 mL of iohexol (OMNIPAQUE)  Enteric Contrast:  Enteric contrast was not administered  FINDINGS:    ABDOMEN    LOWER CHEST:  2 5 x 2 2 x 2 2 cm mass in the left lower lobe (series 2, image 6) concerning for malignancy    There is a small left pleural effusion with adjacent atelectasis  There is a small anterior pericardial effusion  Coronary artery   calcifications  LIVER/BILIARY TREE:  Unremarkable  GALLBLADDER:  Distended  No calcified gallstones  No pericholecystic inflammatory change  SPLEEN:  Unremarkable  PANCREAS:  Unremarkable  ADRENAL GLANDS:  Unremarkable  KIDNEYS/URETERS:  There are small wedge-shaped hypodensities measuring up to 1 3 cm within the lower pole of the right kidney and the upper pole of the left kidney which may be due to scarring versus small renal infarctions  No hydronephrosis  STOMACH AND BOWEL:  Unremarkable  APPENDIX:  No findings to suggest appendicitis  ABDOMINOPELVIC CAVITY:  No ascites  No pneumoperitoneum  No lymphadenopathy  VESSELS:  Moderate atherosclerotic disease of the abdominal aorta  There is narrowing of the distal abdominal aorta, the lumen measuring up to 8 mm in diameter just prior to the bifurcation  PELVIS    REPRODUCTIVE ORGANS:  Enlarged prostate  URINARY BLADDER:  Unremarkable  ABDOMINAL WALL/INGUINAL REGIONS:  Unremarkable  OSSEOUS STRUCTURES:  There are lytic lesions compatible with metastasis  For example, there is a lytic lesion measuring 1 9 cm in the left iliac bone (series 2, image 57)  Additional smaller lytic lesions are seen in the left iliac bone (series 2,   image 53)  A small lytic lesion seen within the left sacral ala  2 6 x 1 9 cm lytic expansile lesion in the left lateral 6th rib  Possible vague lytic lesions are seen within the L4 vertebral body  Impression: 1   2 5 x 2 2 x 2 2 cm mass in the left lower lobe concerning for lung cancer  2   Lytic lesions within the left 6th rib and pelvis bones compatible with osseous metastasis  3   2   Small left pleural effusion  4   3   Small anterior pericardial effusion    5   Small wedge-shaped hypodensities within bilateral kidneys may reflect scarring versus small renal infarcts  6   Other findings as above  I personally discussed this study with Dr Mikael Landry on 11/3/2022 at 1:54 AM     Workstation performed: [id number]    Assessment/Plan     57 y/o male with a suspected metastatic Lung cancer with bilateral DVT and PE on heparin gtt  Patient was found on imaging to have a distended GB with sludge but no edema or stones  Patient does not complain of abdominal pain and is completely asymptomatic on exam  At this time there is no evidence of acute cholecystitis and no surgical intervention is needed  He is ok to have a diet at this time from our standpoint  Counseling / Coordination of Care  Total floor / unit time spent today 20 minutes  Greater than 50% of total time was spent with the patient and / or family counseling and / or coordination of care

## 2022-11-04 NOTE — PLAN OF CARE
Problem: Nutrition/Hydration-ADULT  Goal: Nutrient/Hydration intake appropriate for improving, restoring or maintaining nutritional needs  Description: Monitor and assess patient's nutrition/hydration status for malnutrition  Collaborate with interdisciplinary team and initiate plan and interventions as ordered  Monitor patient's weight and dietary intake as ordered or per policy  Utilize nutrition screening tool and intervene as necessary  Determine patient's food preferences and provide high-protein, high-caloric foods as appropriate       INTERVENTIONS:  - Monitor oral intake, urinary output, labs, and treatment plans  - Assess nutrition and hydration status and recommend course of action  - Evaluate amount of meals eaten  - Assist patient with eating if necessary   - Allow adequate time for meals  - Recommend/ encourage appropriate diets, oral nutritional supplements, and vitamin/mineral supplements  - Order, calculate, and assess calorie counts as needed  - Recommend, monitor, and adjust tube feedings and TPN/PPN based on assessed needs  - Assess need for intravenous fluids  - Provide specific nutrition/hydration education as appropriate  - Include patient/family/caregiver in decisions related to nutrition  Outcome: Progressing     Problem: MOBILITY - ADULT  Goal: Maintain or return to baseline ADL function  Description: INTERVENTIONS:  -  Assess patient's ability to carry out ADLs; assess patient's baseline for ADL function and identify physical deficits which impact ability to perform ADLs (bathing, care of mouth/teeth, toileting, grooming, dressing, etc )  - Assess/evaluate cause of self-care deficits   - Assess range of motion  - Assess patient's mobility; develop plan if impaired  - Assess patient's need for assistive devices and provide as appropriate  - Encourage maximum independence but intervene and supervise when necessary  - Involve family in performance of ADLs  - Assess for home care needs following discharge   - Consider OT consult to assist with ADL evaluation and planning for discharge  - Provide patient education as appropriate  Outcome: Progressing  Goal: Maintains/Returns to pre admission functional level  Description: INTERVENTIONS:  - Perform BMAT or MOVE assessment daily    - Set and communicate daily mobility goal to care team and patient/family/caregiver  - Collaborate with rehabilitation services on mobility goals if consulted  - Perform Range of Motion 2 times a day  - Reposition patient every 2 hours    - Dangle patient 2 times a day  - Stand patient 2 times a day  - Ambulate patient 2 times a day  - Out of bed to chair 2 times a day   - Out of bed for meals 2 times a day  - Out of bed for toileting  - Record patient progress and toleration of activity level   Outcome: Progressing     Problem: Prexisting or High Potential for Compromised Skin Integrity  Goal: Skin integrity is maintained or improved  Description: INTERVENTIONS:  - Identify patients at risk for skin breakdown  - Assess and monitor skin integrity  - Assess and monitor nutrition and hydration status  - Monitor labs   - Assess for incontinence   - Turn and reposition patient  - Assist with mobility/ambulation  - Relieve pressure over bony prominences  - Avoid friction and shearing  - Provide appropriate hygiene as needed including keeping skin clean and dry  - Evaluate need for skin moisturizer/barrier cream  - Collaborate with interdisciplinary team   - Patient/family teaching  - Consider wound care consult   Outcome: Progressing     Problem: PAIN - ADULT  Goal: Verbalizes/displays adequate comfort level or baseline comfort level  Description: Interventions:  - Encourage patient to monitor pain and request assistance  - Assess pain using appropriate pain scale  - Administer analgesics based on type and severity of pain and evaluate response  - Implement non-pharmacological measures as appropriate and evaluate response  - Consider cultural and social influences on pain and pain management  - Notify physician/advanced practitioner if interventions unsuccessful or patient reports new pain  Outcome: Progressing     Problem: INFECTION - ADULT  Goal: Absence or prevention of progression during hospitalization  Description: INTERVENTIONS:  - Assess and monitor for signs and symptoms of infection  - Monitor lab/diagnostic results  - Monitor all insertion sites, i e  indwelling lines, tubes, and drains  - Monitor endotracheal if appropriate and nasal secretions for changes in amount and color  - [place] appropriate cooling/warming therapies per order  - Administer medications as ordered  - Instruct and encourage patient and family to use good hand hygiene technique  - Identify and instruct in appropriate isolation precautions for identified infection/condition  Outcome: Progressing  Goal: Absence of fever/infection during neutropenic period  Description: INTERVENTIONS:  - Monitor WBC    Outcome: Progressing     Problem: SAFETY ADULT  Goal: Maintain or return to baseline ADL function  Description: INTERVENTIONS:  -  Assess patient's ability to carry out ADLs; assess patient's baseline for ADL function and identify physical deficits which impact ability to perform ADLs (bathing, care of mouth/teeth, toileting, grooming, dressing, etc )  - Assess/evaluate cause of self-care deficits   - Assess range of motion  - Assess patient's mobility; develop plan if impaired  - Assess patient's need for assistive devices and provide as appropriate  - Encourage maximum independence but intervene and supervise when necessary  - Involve family in performance of ADLs  - Assess for home care needs following discharge   - Consider OT consult to assist with ADL evaluation and planning for discharge  - Provide patient education as appropriate  Outcome: Progressing  Goal: Maintains/Returns to pre admission functional level  Description: INTERVENTIONS:  - Perform BMAT or MOVE assessment daily    - Set and communicate daily mobility goal to care team and patient/family/caregiver  - Collaborate with rehabilitation services on mobility goals if consulted  - Perform Range of Motion 2 times a day  - Reposition patient every 2 hours    - Dangle patient 2 times a day  - Stand patient 2 times a day  - Ambulate patient 2 times a day  - Out of bed to chair 2 times a day   - Out of bed for meals 2 times a day  - Out of bed for toileting  - Record patient progress and toleration of activity level   Outcome: Progressing  Goal: Patient will remain free of falls  Description: INTERVENTIONS:  - Educate patient/family on patient safety including physical limitations  - Instruct patient to call for assistance with activity   - Consult OT/PT to assist with strengthening/mobility   - Keep Call bell within reach  - Keep bed low and locked with side rails adjusted as appropriate  - Keep care items and personal belongings within reach  - Initiate and maintain comfort rounds  - Make Fall Risk Sign visible to staff  - Offer Toileting every 2 Hours, in advance of need  - Initiate/Maintain bed/chair alarm  - Obtain necessary fall risk management equipment: socks  - Apply yellow socks and bracelet for high fall risk patients  - Consider moving patient to room near nurses station  Outcome: Progressing     Problem: DISCHARGE PLANNING  Goal: Discharge to home or other facility with appropriate resources  Description: INTERVENTIONS:  - Identify barriers to discharge w/patient and caregiver  - Arrange for needed discharge resources and transportation as appropriate  - Identify discharge learning needs (meds, wound care, etc )  - Arrange for interpretive services to assist at discharge as needed  - Refer to Case Management Department for coordinating discharge planning if the patient needs post-hospital services based on physician/advanced practitioner order or complex needs related to functional status, cognitive ability, or social support system  Outcome: Progressing     Problem: Knowledge Deficit  Goal: Patient/family/caregiver demonstrates understanding of disease process, treatment plan, medications, and discharge instructions  Description: Complete learning assessment and assess knowledge base    Interventions:  - Provide teaching at level of understanding  - Provide teaching via preferred learning methods  Outcome: Progressing     Problem: Potential for Falls  Goal: Patient will remain free of falls  Description: INTERVENTIONS:  - Educate patient/family on patient safety including physical limitations  - Instruct patient to call for assistance with activity   - Consult OT/PT to assist with strengthening/mobility   - Keep Call bell within reach  - Keep bed low and locked with side rails adjusted as appropriate  - Keep care items and personal belongings within reach  - Initiate and maintain comfort rounds  - Make Fall Risk Sign visible to staff  - Offer Toileting every 2 Hours, in advance of need  - Initiate/Maintain bed/chair alarm  - Obtain necessary fall risk management equipment: socks  - Apply yellow socks and bracelet for high fall risk patients  - Consider moving patient to room near nurses station  Outcome: Progressing

## 2022-11-04 NOTE — CONSULTS
Consultation - Neuropsychology/Psychology Department  Heri Blake 58 y o  male MRN: [id number]  Unit/Bed#: -01 Encounter: [id number]        Reason for Consultation:  Heri Blake is a 58y o  year old male who was referred for a Neuropsychological Exam to assess cognitive functioning and comment on capacity to make informed medical decisions  History of Present Illness  States leg keeps going out    Physician Requesting Consult: Hay Medina*    PROBLEM LIST:  Patient Active Problem List   Diagnosis   • SIRS (systemic inflammatory response syndrome) (HCC)   • Suspected Primary malignant neoplasm of left lung metastatic to other site [facility])   • Abnormal CT scan, gallbladder   • Schizophrenia ([facility] [place] [address] )   • Hypercalcemia   • Elevated glucose   • Hyponatremia   • Pedal edema         Historical Information   Past Medical History:   Diagnosis Date   • Bladder infection      History reviewed  No pertinent surgical history  Social History   Social History     Substance and Sexual Activity   Alcohol Use Not Currently     Social History     Substance and Sexual Activity   Drug Use Never     Social History     Tobacco Use   Smoking Status Current Every Day Smoker   • Packs/day: 1 00   • Types: Cigarettes   Smokeless Tobacco Never Used     Family History: History reviewed  No pertinent family history      Meds/Allergies   current meds:   Current Facility-Administered Medications   Medication Dose Route Frequency   • acetaminophen (TYLENOL) tablet 650 mg  650 mg Oral Q8H PRN   • heparin (porcine) 25,000 units in 0 45% NaCl 250 mL infusion (premix)  3-30 Units/kg/hr (Order-Specific) Intravenous Titrated   • influenza vaccine, recombinant, quadrivalent (FLUBLOK) IM injection 0 5 mL  0 5 mL Intramuscular Prior to discharge   • insulin lispro (HumaLOG) 100 units/mL subcutaneous injection 1-5 Units  1-5 Units Subcutaneous Q6H [facility]   • lidocaine (LIDODERM) 5 % patch 1 patch  1 patch Topical Daily   • nicotine (NICODERM CQ) 21 mg/24 hr TD 24 hr patch 1 patch  1 patch Transdermal Daily   • OLANZapine (ZyPREXA) tablet 5 mg  5 mg Oral HS   • ondansetron (ZOFRAN) injection 4 mg  4 mg Intravenous Q6H PRN   • piperacillin-tazobactam (ZOSYN) IVPB 3 375 g  3 375 g Intravenous Q6H   • senna (SENOKOT) tablet 8 6 mg  1 tablet Oral HS PRN       No Known Allergies      Family and Social Support:   Living Arrangements: Lives Alone  Support Systems: Friend  Assistance Needed: walker  Type of Current Residence: [address] [place] [address] Name: [address]: No  Discharge planning discussed with[de-identified] patient  Freedom of Choice: Yes      Behavioral Observations: Alert, oriented except believes he is presently in [place]  Admitted to depressed mood and anxiety; patient was planning to leave [place] and reported to this examiner, "I'll stay now, my friends are not cooperating"  Patient reported his health has been "excellent"  Cognitive Examination    General Cognitive Functioning MMSE = Deficits in recall, working memory, aspects of orientation    Attention/Concentration Auditory Selective Attention = Average; Auditory Vigilance = Within Normal Limits; Information Processing Speed = Within Normal Limits    Frontal Systems/Executive Functioning Mental Flexibility/Cognitive Control = Within Normal Limits; Working Memory = Impaired Abstract Reasoning = Severely Impaired; Generative Ability = Impaired,     Language Functioning Phonemic Fluency = Impaired; Semantic Retrieval = Impaired;  Comprehension of Complex Ideational Material = Impaired;     Memory Functioning Three word recall = Impaired    Visuo-Spatial Abilities Not Assessed    Functional Knowledge  Health & Safety Knowledge = Impaired;     Summary/Impression:  Results of Neuropsychological Exam revealed diffuse cognitive dysfunction and on a measure assessing awareness of personal health status and ability to evaluate health problems, handle medical emergencies and take safety precautions, patient performed in the IMPAIRED range of functioning  At this time, patient does not appear to have capacity to make fully informed medical decisions   Unspecified Neurocognitive Disorder, Schizophrenia by history

## 2022-11-05 LAB
ALBUMIN SERPL BCP-MCNC: 2.1 G/DL (ref 3.5–5)
ALP SERPL-CCNC: 89 U/L (ref 46–116)
ALT SERPL W P-5'-P-CCNC: 18 U/L (ref 12–78)
ANION GAP SERPL CALCULATED.3IONS-SCNC: 2 MMOL/L (ref 4–13)
APTT PPP: 79 SECONDS (ref 23–37)
AST SERPL W P-5'-P-CCNC: 11 U/L (ref 5–45)
BASOPHILS # BLD AUTO: 0.03 THOUSANDS/ÂΜL (ref 0–0.1)
BASOPHILS NFR BLD AUTO: 0 % (ref 0–1)
BILIRUB SERPL-MCNC: 0.8 MG/DL (ref 0.2–1)
BUN SERPL-MCNC: 11 MG/DL (ref 5–25)
CALCIUM ALBUM COR SERPL-MCNC: 11 MG/DL (ref 8.3–10.1)
CALCIUM SERPL-MCNC: 9.5 MG/DL (ref 8.3–10.1)
CHLORIDE SERPL-SCNC: 96 MMOL/L (ref 96–108)
CO2 SERPL-SCNC: 36 MMOL/L (ref 21–32)
CREAT SERPL-MCNC: 0.75 MG/DL (ref 0.6–1.3)
EOSINOPHIL # BLD AUTO: 0.03 THOUSAND/ÂΜL (ref 0–0.61)
EOSINOPHIL NFR BLD AUTO: 0 % (ref 0–6)
ERYTHROCYTE [DISTWIDTH] IN BLOOD BY AUTOMATED COUNT: 16.4 % (ref 11.6–15.1)
GFR SERPL CREATININE-BSD FRML MDRD: 98 ML/MIN/1.73SQ M
GLUCOSE SERPL-MCNC: 196 MG/DL (ref 65–140)
GLUCOSE SERPL-MCNC: 199 MG/DL (ref 65–140)
GLUCOSE SERPL-MCNC: 213 MG/DL (ref 65–140)
GLUCOSE SERPL-MCNC: 219 MG/DL (ref 65–140)
GLUCOSE SERPL-MCNC: 230 MG/DL (ref 65–140)
HCT VFR BLD AUTO: 26.3 % (ref 36.5–49.3)
HGB BLD-MCNC: 8.9 G/DL (ref 12–17)
IMM GRANULOCYTES # BLD AUTO: 0.14 THOUSAND/UL (ref 0–0.2)
IMM GRANULOCYTES NFR BLD AUTO: 1 % (ref 0–2)
LYMPHOCYTES # BLD AUTO: 1.88 THOUSANDS/ÂΜL (ref 0.6–4.47)
LYMPHOCYTES NFR BLD AUTO: 14 % (ref 14–44)
MAGNESIUM SERPL-MCNC: 1.6 MG/DL (ref 1.6–2.6)
MCH RBC QN AUTO: 31.6 PG (ref 26.8–34.3)
MCHC RBC AUTO-ENTMCNC: 33.8 G/DL (ref 31.4–37.4)
MCV RBC AUTO: 93 FL (ref 82–98)
MONOCYTES # BLD AUTO: 1.04 THOUSAND/ÂΜL (ref 0.17–1.22)
MONOCYTES NFR BLD AUTO: 8 % (ref 4–12)
NEUTROPHILS # BLD AUTO: 10.42 THOUSANDS/ÂΜL (ref 1.85–7.62)
NEUTS SEG NFR BLD AUTO: 77 % (ref 43–75)
NRBC BLD AUTO-RTO: 0 /100 WBCS
PLATELET # BLD AUTO: 213 THOUSANDS/UL (ref 149–390)
PMV BLD AUTO: 10 FL (ref 8.9–12.7)
POTASSIUM SERPL-SCNC: 3.1 MMOL/L (ref 3.5–5.3)
PROT SERPL-MCNC: 5.8 G/DL (ref 6.4–8.4)
RBC # BLD AUTO: 2.82 MILLION/UL (ref 3.88–5.62)
SODIUM SERPL-SCNC: 134 MMOL/L (ref 135–147)
WBC # BLD AUTO: 13.54 THOUSAND/UL (ref 4.31–10.16)

## 2022-11-05 RX ORDER — POTASSIUM CHLORIDE 20 MEQ/1
40 TABLET, EXTENDED RELEASE ORAL ONCE
Status: COMPLETED | OUTPATIENT
Start: 2022-11-05 | End: 2022-11-05

## 2022-11-05 RX ORDER — POTASSIUM CHLORIDE 14.9 MG/ML
20 INJECTION INTRAVENOUS ONCE
Status: COMPLETED | OUTPATIENT
Start: 2022-11-05 | End: 2022-11-05

## 2022-11-05 RX ORDER — LIDOCAINE 50 MG/G
1 PATCH TOPICAL DAILY
Status: DISCONTINUED | OUTPATIENT
Start: 2022-11-05 | End: 2022-11-18 | Stop reason: HOSPADM

## 2022-11-05 RX ADMIN — OLANZAPINE 5 MG: 5 TABLET, FILM COATED ORAL at 21:29

## 2022-11-05 RX ADMIN — HEPARIN SODIUM 20 UNITS/KG/HR: 10000 INJECTION, SOLUTION INTRAVENOUS at 15:27

## 2022-11-05 RX ADMIN — ACETAMINOPHEN 650 MG: 325 TABLET, FILM COATED ORAL at 11:44

## 2022-11-05 RX ADMIN — PIPERACILLIN AND TAZOBACTAM 3.38 G: 3; .375 INJECTION, POWDER, LYOPHILIZED, FOR SOLUTION INTRAVENOUS at 16:28

## 2022-11-05 RX ADMIN — INSULIN LISPRO 1 UNITS: 100 INJECTION, SOLUTION INTRAVENOUS; SUBCUTANEOUS at 21:28

## 2022-11-05 RX ADMIN — PIPERACILLIN AND TAZOBACTAM 3.38 G: 3; .375 INJECTION, POWDER, LYOPHILIZED, FOR SOLUTION INTRAVENOUS at 10:52

## 2022-11-05 RX ADMIN — POTASSIUM CHLORIDE 40 MEQ: 1500 TABLET, EXTENDED RELEASE ORAL at 16:29

## 2022-11-05 RX ADMIN — INSULIN LISPRO 2 UNITS: 100 INJECTION, SOLUTION INTRAVENOUS; SUBCUTANEOUS at 12:28

## 2022-11-05 RX ADMIN — INSULIN LISPRO 3 UNITS: 100 INJECTION, SOLUTION INTRAVENOUS; SUBCUTANEOUS at 08:55

## 2022-11-05 RX ADMIN — INSULIN LISPRO 2 UNITS: 100 INJECTION, SOLUTION INTRAVENOUS; SUBCUTANEOUS at 08:55

## 2022-11-05 RX ADMIN — INSULIN LISPRO 3 UNITS: 100 INJECTION, SOLUTION INTRAVENOUS; SUBCUTANEOUS at 12:28

## 2022-11-05 RX ADMIN — PIPERACILLIN AND TAZOBACTAM 3.38 G: 3; .375 INJECTION, POWDER, LYOPHILIZED, FOR SOLUTION INTRAVENOUS at 03:20

## 2022-11-05 RX ADMIN — PIPERACILLIN AND TAZOBACTAM 3.38 G: 3; .375 INJECTION, POWDER, LYOPHILIZED, FOR SOLUTION INTRAVENOUS at 21:32

## 2022-11-05 RX ADMIN — ACETAMINOPHEN 650 MG: 325 TABLET, FILM COATED ORAL at 05:10

## 2022-11-05 RX ADMIN — INSULIN LISPRO 3 UNITS: 100 INJECTION, SOLUTION INTRAVENOUS; SUBCUTANEOUS at 17:16

## 2022-11-05 RX ADMIN — INSULIN LISPRO 1 UNITS: 100 INJECTION, SOLUTION INTRAVENOUS; SUBCUTANEOUS at 17:15

## 2022-11-05 RX ADMIN — NICOTINE 1 PATCH: 21 PATCH, EXTENDED RELEASE TRANSDERMAL at 08:55

## 2022-11-05 RX ADMIN — LIDOCAINE 5% 1 PATCH: 700 PATCH TOPICAL at 08:55

## 2022-11-05 RX ADMIN — LIDOCAINE 5% 1 PATCH: 700 PATCH TOPICAL at 17:14

## 2022-11-05 RX ADMIN — POTASSIUM CHLORIDE 20 MEQ: 14.9 INJECTION, SOLUTION INTRAVENOUS at 17:11

## 2022-11-05 RX ADMIN — ACETAMINOPHEN 650 MG: 325 TABLET, FILM COATED ORAL at 19:32

## 2022-11-05 RX ADMIN — INSULIN GLARGINE 6 UNITS: 100 INJECTION, SOLUTION SUBCUTANEOUS at 21:29

## 2022-11-05 NOTE — PLAN OF CARE
Problem: Nutrition/Hydration-ADULT  Goal: Nutrient/Hydration intake appropriate for improving, restoring or maintaining nutritional needs  Description: Monitor and assess patient's nutrition/hydration status for malnutrition  Collaborate with interdisciplinary team and initiate plan and interventions as ordered  Monitor patient's weight and dietary intake as ordered or per policy  Utilize nutrition screening tool and intervene as necessary  Determine patient's food preferences and provide high-protein, high-caloric foods as appropriate       INTERVENTIONS:  - Monitor oral intake, urinary output, labs, and treatment plans  - Assess nutrition and hydration status and recommend course of action  - Evaluate amount of meals eaten  - Assist patient with eating if necessary   - Allow adequate time for meals  - Recommend/ encourage appropriate diets, oral nutritional supplements, and vitamin/mineral supplements  - Order, calculate, and assess calorie counts as needed  - Recommend, monitor, and adjust tube feedings and TPN/PPN based on assessed needs  - Assess need for intravenous fluids  - Provide specific nutrition/hydration education as appropriate  - Include patient/family/caregiver in decisions related to nutrition  Outcome: Progressing     Problem: PAIN - ADULT  Goal: Verbalizes/displays adequate comfort level or baseline comfort level  Description: Interventions:  - Encourage patient to monitor pain and request assistance  - Assess pain using appropriate pain scale  - Administer analgesics based on type and severity of pain and evaluate response  - Implement non-pharmacological measures as appropriate and evaluate response  - Consider cultural and social influences on pain and pain management  - Notify physician/advanced practitioner if interventions unsuccessful or patient reports new pain  Outcome: Progressing     Problem: DISCHARGE PLANNING  Goal: Discharge to home or other facility with appropriate resources  Description: INTERVENTIONS:  - Identify barriers to discharge w/patient and caregiver  - Arrange for needed discharge resources and transportation as appropriate  - Identify discharge learning needs (meds, wound care, etc )  - Arrange for interpretive services to assist at discharge as needed  - Refer to Case Management Department for coordinating discharge planning if the patient needs post-hospital services based on physician/advanced practitioner order or complex needs related to functional status, cognitive ability, or social support system  Outcome: Progressing     Problem: Knowledge Deficit  Goal: Patient/family/caregiver demonstrates understanding of disease process, treatment plan, medications, and discharge instructions  Description: Complete learning assessment and assess knowledge base    Interventions:  - Provide teaching at level of understanding  - Provide teaching via preferred learning methods  Outcome: Progressing     Problem: Potential for Falls  Goal: Patient will remain free of falls  Description: INTERVENTIONS:  -  Assess patient's ability to carry out ADLs; assess patient's baseline for ADL function and identify physical deficits which impact ability to perform ADLs (bathing, care of mouth/teeth, toileting, grooming, dressing, etc )  - Assess/evaluate cause of self-care deficits   - Assess range of motion  - Assess patient's mobility; develop plan if impaired  - Assess patient's need for assistive devices and provide as appropriate  - Encourage maximum independence but intervene and supervise when necessary  - Involve family in performance of ADLs  - Assess for home care needs following discharge   - Consider OT consult to assist with ADL evaluation and planning for discharge  - Provide patient education as appropriate  Outcome: Progressing

## 2022-11-05 NOTE — PROGRESS NOTES
[place]  Progress Note Yoni Mediate 1960, 58 y o  male MRN: [id number]  Unit/Bed#: -01 Encounter: [id number]  Primary Care Provider: No primary care provider on file  Date and time admitted to hospital: 11/2/2022  9:13 PM    Hyponatremia  Assessment & Plan  · Sodium at 129 with chloride at 91, sodium corrects to 131 for hyperglycemia   · Suspect hypovolemia as contributory   · Recheck CMP in morning after IV fluids    Elevated glucose  Assessment & Plan  · Serum glucose on admission is 233   · No prior reported history of DM, however patient poor historian  · Will obtain hemoglobin A1c and start on SSI    Hypercalcemia  Assessment & Plan  · Corrected calcium 11 5   · Received 2 L of IV fluids in the ER, will place on continuous fluids overnight and recheck CMP in morning  · If calcium is persistently elevated despite hydration, would consult Nephrology as could be secondary to newly diagnosed malignancy with lytic lesions on CT scan  · Check ionized calcium     Schizophrenia ([facility] [address] )  Assessment & Plan  · Self-reported history of schizophrenia   · Reportedly treated with clonazepam 100 mg HS, however patient reports non adherence with this   · Presented to the ED with acute psychosis requiring Haldol 5 mg IM x1  · Neuro- psychiatry was consulted because of question about patient's capacity  Patient did not seem to have insight into his condition  He does not have capacity to make medical decisions at this time      Abnormal CT scan, gallbladder  Assessment & Plan  · Distended gallbladder on CT scan   · Obtain right upper quadrant ultrasound patient also with elevated T bili and alk-phos  · Continue Zosyn for now    SIRS (systemic inflammatory response syndrome) (HCC)  Assessment & Plan  · Sirs criteria met on admission:  Tachycardia and leukocytosis at 17 36 K without bandemia   · No severe sepsis criteria met   · Unclear if infectious source versus secondary to suspected metastatic lung cancer as seen on CT scan   · UA without infection   · Procalcitonin elevated at 11 67  · ? Elevation secondary to infection or possible small cell lung cancer  · CT scan does show distended gallbladder and with elevated T bili and alk-phos, will treat with IV Zosyn to cover for possible infection  · Blood cultures x2, shows no growth in 24 hours    * Suspected Primary malignant neoplasm of left lung metastatic to other site [facility])  Assessment & Plan  · Presents with generalized weakness, fatigue, and weight loss  · CT a/P: " 2 5 x 2 2 x 2 2 cm mass in the left lower lobe concerning for lung cancer  Lytic lesions within the left 6th rib and pelvis bones compatible with osseous metastasis "  · Longstanding smoking history  · Heme-Onc recommended tissue diagnosis with complete staging  They recommended patient will need anticoagulation which will be lifelong based on probable stage IV malignancy causing hypercoagulable state  · IR tissue biopsy pending  · Palliative care is on board        VTE Pharmacologic Prophylaxis: VTE Score: 4 Moderate Risk (Score 3-4) - Pharmacological DVT Prophylaxis Ordered: enoxaparin (Lovenox)  Time Spent for Care: 30 minutes  More than 50% of total time spent on counseling and coordination of care as described above  Current Length of Stay: 2 day(s)  Current Patient Status: Inpatient   Certification Statement: The patient will continue to require additional inpatient hospital stay due to r/o Lung cancer  Discharge Plan: Anticipate discharge in 24-48 hrs to rehab facility  Code Status: Level 1 - Full Code    Subjective:   No fresh Complaints    Objective:   Patient looks well    Vitals:   Temp (24hrs), Av 4 °F (37 4 °C), Min:98 8 °F (37 1 °C), Max:99 9 °F (37 7 °C)    Temp:  [98 8 °F (37 1 °C)-99 9 °F (37 7 °C)] 98 8 °F (37 1 °C)  HR:  [110-124] 110  Resp:  [19] 19  BP: (128-133)/(76-80) 133/80  SpO2:  [90 %-93 %] 90 %  Body mass index is 23 63 kg/m²  Input and Output Summary (last 24 hours): Intake/Output Summary (Last 24 hours) at 11/5/2022 1423  Last data filed at 11/5/2022 1100  Gross per 24 hour   Intake 120 ml   Output 3005 ml   Net -2885 ml       Physical Exam:   Physical Exam  Vitals and nursing note reviewed  Constitutional:       Appearance: He is well-developed  HENT:      Head: Normocephalic and atraumatic  Eyes:      Conjunctiva/sclera: Conjunctivae normal    Cardiovascular:      Rate and Rhythm: Normal rate and regular rhythm  Heart sounds: No murmur heard  Pulmonary:      Effort: Pulmonary effort is normal  No respiratory distress  Breath sounds: Normal breath sounds  Abdominal:      Palpations: Abdomen is soft  Tenderness: There is no abdominal tenderness  Musculoskeletal:      Cervical back: Neck supple  Skin:     General: Skin is warm and dry  Neurological:      Mental Status: He is alert              Additional Data:     Labs:  Results from last 7 days   Lab Units 11/05/22  1305   WBC Thousand/uL 13 54*   HEMOGLOBIN g/dL 8 9*   HEMATOCRIT % 26 3*   PLATELETS Thousands/uL 213   NEUTROS PCT % 77*   LYMPHS PCT % 14   MONOS PCT % 8   EOS PCT % 0     Results from last 7 days   Lab Units 11/05/22  1305   SODIUM mmol/L 134*   POTASSIUM mmol/L 3 1*   CHLORIDE mmol/L 96   CO2 mmol/L 36*   BUN mg/dL 11   CREATININE mg/dL 0 75   ANION GAP mmol/L 2*   CALCIUM mg/dL 9 5   ALBUMIN g/dL 2 1*   TOTAL BILIRUBIN mg/dL 0 80   ALK PHOS U/L 89   ALT U/L 18   AST U/L 11   GLUCOSE RANDOM mg/dL 230*     Results from last 7 days   Lab Units 11/03/22  1234   INR  1 08     Results from last 7 days   Lab Units 11/05/22  1129 11/05/22  0757 11/04/22  2044 11/04/22  1653 11/04/22  1110 11/04/22  0711 11/04/22  0555 11/03/22  2338 11/03/22  2235 11/03/22  1653 11/03/22  1055 11/03/22  0706   POC GLUCOSE mg/dl 219* 213* 290* 232* 257* 198* 205* 213* 206* 183* 122 333*     Results from last 7 days   Lab Units 11/02/22  2349   HEMOGLOBIN A1C % 9 1*     Results from last 7 days   Lab Units 11/04/22  0254 11/03/22  0442 11/02/22  2349   LACTIC ACID mmol/L  --   --  1 1   PROCALCITONIN ng/ml 3 26* 10 74* 11 67*       Lines/Drains:  Invasive Devices  Report    Peripheral Intravenous Line  Duration           Peripheral IV 11/02/22 Right Antecubital 2 days    Peripheral IV 11/03/22 Dorsal (posterior); Left Forearm 2 days                  Telemetry:  Telemetry Orders (From admission, onward)             48 Hour Telemetry Monitoring  Continuous x 48 hours        References:    Telemetry Guidelines   Question:  Reason for 48 Hour Telemetry  Answer:  Arrhythmias Requiring Medical Therapy (eg  SVT, Vtach/fib, Bradycardia, Uncontrolled A-fib)                            Recent Cultures (last 7 days):   Results from last 7 days   Lab Units 11/02/22  2349   BLOOD CULTURE  No Growth at 48 hrs  No Growth at 48 hrs         Last 24 Hours Medication List:   Current Facility-Administered Medications   Medication Dose Route Frequency Provider Last Rate   • acetaminophen  650 mg Oral Q8H PRN Deepti Sheriff PA-C     • heparin (porcine)  3-30 Units/kg/hr (Order-Specific) Intravenous Titrated Binta Suárez MD 20 Units/kg/hr (11/04/22 2239)   • influenza vaccine  0 5 mL Intramuscular Prior to discharge Deepti Sheriff PA-C     • insulin glargine  6 Units Subcutaneous HS Deepti Sheriff PA-C     • insulin lispro  1-5 Units Subcutaneous TID AC Deepti Sheriff PA-C     • insulin lispro  1-5 Units Subcutaneous HS Deepti Sheriff PA-C     • insulin lispro  3 Units Subcutaneous TID With Meals Deepti Sheriff PA-C     • lidocaine  1 patch Topical Daily Binta Suárez MD     • lidocaine  1 patch Topical Daily Binta Suárez MD     • nicotine  1 patch Transdermal Daily Deepti Sheriff PA-C     • OLANZapine  5 mg Oral HS Binta Suárez MD     • ondansetron  4 mg Intravenous Q6H PRN Deepti Sheriff LIDIA     • piperacillin-tazobactam  3 375 g Intravenous Q6H Winnie Tavarez PA-C 3 375 g (11/05/22 0320)   • senna  1 tablet Oral HS PRN Winnie Tavarez PA-C          Today, Patient Was Seen By: Lexie Gitelman    **Please Note: This note may have been constructed using a voice recognition system  **

## 2022-11-05 NOTE — PLAN OF CARE
Problem: Nutrition/Hydration-ADULT  Goal: Nutrient/Hydration intake appropriate for improving, restoring or maintaining nutritional needs  Description: Monitor and assess patient's nutrition/hydration status for malnutrition  Collaborate with interdisciplinary team and initiate plan and interventions as ordered  Monitor patient's weight and dietary intake as ordered or per policy  Utilize nutrition screening tool and intervene as necessary  Determine patient's food preferences and provide high-protein, high-caloric foods as appropriate       INTERVENTIONS:  - Monitor oral intake, urinary output, labs, and treatment plans  - Assess nutrition and hydration status and recommend course of action  - Evaluate amount of meals eaten  - Assist patient with eating if necessary   - Allow adequate time for meals  - Recommend/ encourage appropriate diets, oral nutritional supplements, and vitamin/mineral supplements  - Order, calculate, and assess calorie counts as needed  - Recommend, monitor, and adjust tube feedings and TPN/PPN based on assessed needs  - Assess need for intravenous fluids  - Provide specific nutrition/hydration education as appropriate  - Include patient/family/caregiver in decisions related to nutrition  Outcome: Progressing     Problem: MOBILITY - ADULT  Goal: Maintain or return to baseline ADL function  Description: INTERVENTIONS:  -  Assess patient's ability to carry out ADLs; assess patient's baseline for ADL function and identify physical deficits which impact ability to perform ADLs (bathing, care of mouth/teeth, toileting, grooming, dressing, etc )  - Assess/evaluate cause of self-care deficits   - Assess range of motion  - Assess patient's mobility; develop plan if impaired  - Assess patient's need for assistive devices and provide as appropriate  - Encourage maximum independence but intervene and supervise when necessary  - Involve family in performance of ADLs  - Assess for home care needs following discharge   - Consider OT consult to assist with ADL evaluation and planning for discharge  - Provide patient education as appropriate  Outcome: Progressing  Goal: Maintains/Returns to pre admission functional level  Description: INTERVENTIONS:  - Perform BMAT or MOVE assessment daily    - Set and communicate daily mobility goal to care team and patient/family/caregiver  - Collaborate with rehabilitation services on mobility goals if consulted  - Perform Range of Motion 2 times a day  - Reposition patient every 2 hours    - Dangle patient 2 times a day  - Stand patient 2 times a day  - Ambulate patient 2 times a day  - Out of bed to chair 2 times a day   - Out of bed for meals 2 times a day  - Out of bed for toileting  - Record patient progress and toleration of activity level   Outcome: Progressing     Problem: Prexisting or High Potential for Compromised Skin Integrity  Goal: Skin integrity is maintained or improved  Description: INTERVENTIONS:  - Identify patients at risk for skin breakdown  - Assess and monitor skin integrity  - Assess and monitor nutrition and hydration status  - Monitor labs   - Assess for incontinence   - Turn and reposition patient  - Assist with mobility/ambulation  - Relieve pressure over bony prominences  - Avoid friction and shearing  - Provide appropriate hygiene as needed including keeping skin clean and dry  - Evaluate need for skin moisturizer/barrier cream  - Collaborate with interdisciplinary team   - Patient/family teaching  - Consider wound care consult   Outcome: Progressing     Problem: PAIN - ADULT  Goal: Verbalizes/displays adequate comfort level or baseline comfort level  Description: Interventions:  - Encourage patient to monitor pain and request assistance  - Assess pain using appropriate pain scale  - Administer analgesics based on type and severity of pain and evaluate response  - Implement non-pharmacological measures as appropriate and evaluate response  - Consider cultural and social influences on pain and pain management  - Notify physician/advanced practitioner if interventions unsuccessful or patient reports new pain  Outcome: Progressing     Problem: INFECTION - ADULT  Goal: Absence or prevention of progression during hospitalization  Description: INTERVENTIONS:  - Assess and monitor for signs and symptoms of infection  - Monitor lab/diagnostic results  - Monitor all insertion sites, i e  indwelling lines, tubes, and drains  - Monitor endotracheal if appropriate and nasal secretions for changes in amount and color  - Fisher appropriate cooling/warming therapies per order  - Administer medications as ordered  - Instruct and encourage patient and family to use good hand hygiene technique  - Identify and instruct in appropriate isolation precautions for identified infection/condition  Outcome: Progressing  Goal: Absence of fever/infection during neutropenic period  Description: INTERVENTIONS:  - Monitor WBC    Outcome: Progressing     Problem: SAFETY ADULT  Goal: Maintain or return to baseline ADL function  Description: INTERVENTIONS:  -  Assess patient's ability to carry out ADLs; assess patient's baseline for ADL function and identify physical deficits which impact ability to perform ADLs (bathing, care of mouth/teeth, toileting, grooming, dressing, etc )  - Assess/evaluate cause of self-care deficits   - Assess range of motion  - Assess patient's mobility; develop plan if impaired  - Assess patient's need for assistive devices and provide as appropriate  - Encourage maximum independence but intervene and supervise when necessary  - Involve family in performance of ADLs  - Assess for home care needs following discharge   - Consider OT consult to assist with ADL evaluation and planning for discharge  - Provide patient education as appropriate  Outcome: Progressing  Goal: Maintains/Returns to pre admission functional level  Description: INTERVENTIONS:  - Perform BMAT or MOVE assessment daily    - Set and communicate daily mobility goal to care team and patient/family/caregiver  - Collaborate with rehabilitation services on mobility goals if consulted  - Perform Range of Motion 2 times a day  - Reposition patient every 2 hours    - Dangle patient 2 times a day  - Stand patient 2 times a day  - Ambulate patient 2 times a day  - Out of bed to chair 2 times a day   - Out of bed for meals 2 times a day  - Out of bed for toileting  - Record patient progress and toleration of activity level   Outcome: Progressing  Goal: Patient will remain free of falls  Description: INTERVENTIONS:  - Educate patient/family on patient safety including physical limitations  - Instruct patient to call for assistance with activity   - Consult OT/PT to assist with strengthening/mobility   - Keep Call bell within reach  - Keep bed low and locked with side rails adjusted as appropriate  - Keep care items and personal belongings within reach  - Initiate and maintain comfort rounds  - Make Fall Risk Sign visible to staff  - Offer Toileting every 2 Hours, in advance of need  - Initiate/Maintain bedalarm  - Obtain necessary fall risk management equipment: yellow socks, yellow bracelet  - Apply yellow socks and bracelet for high fall risk patients  - Consider moving patient to room near nurses station  Outcome: Progressing     Problem: DISCHARGE PLANNING  Goal: Discharge to home or other facility with appropriate resources  Description: INTERVENTIONS:  - Identify barriers to discharge w/patient and caregiver  - Arrange for needed discharge resources and transportation as appropriate  - Identify discharge learning needs (meds, wound care, etc )  - Arrange for interpretive services to assist at discharge as needed  - Refer to Case Management Department for coordinating discharge planning if the patient needs post-hospital services based on physician/advanced practitioner order or complex needs related to functional status, cognitive ability, or social support system  Outcome: Progressing     Problem: Knowledge Deficit  Goal: Patient/family/caregiver demonstrates understanding of disease process, treatment plan, medications, and discharge instructions  Description: Complete learning assessment and assess knowledge base    Interventions:  - Provide teaching at level of understanding  - Provide teaching via preferred learning methods  Outcome: Progressing     Problem: Potential for Falls  Goal: Patient will remain free of falls  Description: INTERVENTIONS:  - Educate patient/family on patient safety including physical limitations  - Instruct patient to call for assistance with activity   - Consult OT/PT to assist with strengthening/mobility   - Keep Call bell within reach  - Keep bed low and locked with side rails adjusted as appropriate  - Keep care items and personal belongings within reach  - Initiate and maintain comfort rounds  - Make Fall Risk Sign visible to staff  - Offer Toileting every 2 Hours, in advance of need  - Initiate/Maintain bed alarm  - Obtain necessary fall risk management equipment: yellow socks, yellow bracelet  - Apply yellow socks and bracelet for high fall risk patients  - Consider moving patient to room near nurses station  Outcome: Progressing

## 2022-11-06 ENCOUNTER — APPOINTMENT (INPATIENT)
Dept: MRI IMAGING | Facility: HOSPITAL | Age: 62
End: 2022-11-06

## 2022-11-06 LAB
ALBUMIN SERPL BCP-MCNC: 2 G/DL (ref 3.5–5)
ALP SERPL-CCNC: 84 U/L (ref 46–116)
ALT SERPL W P-5'-P-CCNC: 14 U/L (ref 12–78)
ANION GAP SERPL CALCULATED.3IONS-SCNC: 2 MMOL/L (ref 4–13)
APTT PPP: 80 SECONDS (ref 23–37)
AST SERPL W P-5'-P-CCNC: 12 U/L (ref 5–45)
BASOPHILS # BLD AUTO: 0.04 THOUSANDS/ÂΜL (ref 0–0.1)
BASOPHILS NFR BLD AUTO: 0 % (ref 0–1)
BILIRUB SERPL-MCNC: 0.7 MG/DL (ref 0.2–1)
BUN SERPL-MCNC: 10 MG/DL (ref 5–25)
CALCIUM ALBUM COR SERPL-MCNC: 11.3 MG/DL (ref 8.3–10.1)
CALCIUM SERPL-MCNC: 9.7 MG/DL (ref 8.3–10.1)
CHLORIDE SERPL-SCNC: 99 MMOL/L (ref 96–108)
CO2 SERPL-SCNC: 35 MMOL/L (ref 21–32)
CREAT SERPL-MCNC: 0.7 MG/DL (ref 0.6–1.3)
EOSINOPHIL # BLD AUTO: 0.07 THOUSAND/ÂΜL (ref 0–0.61)
EOSINOPHIL NFR BLD AUTO: 1 % (ref 0–6)
ERYTHROCYTE [DISTWIDTH] IN BLOOD BY AUTOMATED COUNT: 16.5 % (ref 11.6–15.1)
GFR SERPL CREATININE-BSD FRML MDRD: 101 ML/MIN/1.73SQ M
GLUCOSE SERPL-MCNC: 167 MG/DL (ref 65–140)
GLUCOSE SERPL-MCNC: 187 MG/DL (ref 65–140)
GLUCOSE SERPL-MCNC: 199 MG/DL (ref 65–140)
GLUCOSE SERPL-MCNC: 206 MG/DL (ref 65–140)
GLUCOSE SERPL-MCNC: 237 MG/DL (ref 65–140)
HCT VFR BLD AUTO: 24 % (ref 36.5–49.3)
HGB BLD-MCNC: 8.1 G/DL (ref 12–17)
IMM GRANULOCYTES # BLD AUTO: 0.15 THOUSAND/UL (ref 0–0.2)
IMM GRANULOCYTES NFR BLD AUTO: 1 % (ref 0–2)
LYMPHOCYTES # BLD AUTO: 2.1 THOUSANDS/ÂΜL (ref 0.6–4.47)
LYMPHOCYTES NFR BLD AUTO: 15 % (ref 14–44)
MCH RBC QN AUTO: 31.2 PG (ref 26.8–34.3)
MCHC RBC AUTO-ENTMCNC: 33.8 G/DL (ref 31.4–37.4)
MCV RBC AUTO: 92 FL (ref 82–98)
MONOCYTES # BLD AUTO: 1.31 THOUSAND/ÂΜL (ref 0.17–1.22)
MONOCYTES NFR BLD AUTO: 9 % (ref 4–12)
NEUTROPHILS # BLD AUTO: 10.72 THOUSANDS/ÂΜL (ref 1.85–7.62)
NEUTS SEG NFR BLD AUTO: 74 % (ref 43–75)
NRBC BLD AUTO-RTO: 0 /100 WBCS
PLATELET # BLD AUTO: 196 THOUSANDS/UL (ref 149–390)
PMV BLD AUTO: 9.8 FL (ref 8.9–12.7)
POTASSIUM SERPL-SCNC: 3.1 MMOL/L (ref 3.5–5.3)
PROT SERPL-MCNC: 5.5 G/DL (ref 6.4–8.4)
RBC # BLD AUTO: 2.6 MILLION/UL (ref 3.88–5.62)
SODIUM SERPL-SCNC: 136 MMOL/L (ref 135–147)
WBC # BLD AUTO: 14.39 THOUSAND/UL (ref 4.31–10.16)

## 2022-11-06 RX ORDER — OXYCODONE HYDROCHLORIDE 5 MG/1
5 TABLET ORAL EVERY 6 HOURS PRN
Status: DISPENSED | OUTPATIENT
Start: 2022-11-06 | End: 2022-11-07

## 2022-11-06 RX ORDER — INSULIN GLARGINE 100 [IU]/ML
8 INJECTION, SOLUTION SUBCUTANEOUS
Status: DISCONTINUED | OUTPATIENT
Start: 2022-11-06 | End: 2022-11-12

## 2022-11-06 RX ADMIN — INSULIN GLARGINE 8 UNITS: 100 INJECTION, SOLUTION SUBCUTANEOUS at 21:33

## 2022-11-06 RX ADMIN — INSULIN LISPRO 2 UNITS: 100 INJECTION, SOLUTION INTRAVENOUS; SUBCUTANEOUS at 21:33

## 2022-11-06 RX ADMIN — NICOTINE 1 PATCH: 21 PATCH, EXTENDED RELEASE TRANSDERMAL at 08:57

## 2022-11-06 RX ADMIN — INSULIN LISPRO 3 UNITS: 100 INJECTION, SOLUTION INTRAVENOUS; SUBCUTANEOUS at 12:37

## 2022-11-06 RX ADMIN — PIPERACILLIN AND TAZOBACTAM 3.38 G: 3; .375 INJECTION, POWDER, LYOPHILIZED, FOR SOLUTION INTRAVENOUS at 09:02

## 2022-11-06 RX ADMIN — OLANZAPINE 5 MG: 5 TABLET, FILM COATED ORAL at 21:33

## 2022-11-06 RX ADMIN — LIDOCAINE 5% 1 PATCH: 700 PATCH TOPICAL at 09:11

## 2022-11-06 RX ADMIN — GADOBUTROL 7 ML: 604.72 INJECTION INTRAVENOUS at 18:19

## 2022-11-06 RX ADMIN — PIPERACILLIN AND TAZOBACTAM 3.38 G: 3; .375 INJECTION, POWDER, LYOPHILIZED, FOR SOLUTION INTRAVENOUS at 03:44

## 2022-11-06 RX ADMIN — OXYCODONE HYDROCHLORIDE 5 MG: 5 TABLET ORAL at 13:56

## 2022-11-06 RX ADMIN — LIDOCAINE 5% 1 PATCH: 700 PATCH TOPICAL at 08:58

## 2022-11-06 RX ADMIN — HEPARIN SODIUM 20 UNITS/KG/HR: 10000 INJECTION, SOLUTION INTRAVENOUS at 08:54

## 2022-11-06 RX ADMIN — ACETAMINOPHEN 650 MG: 325 TABLET, FILM COATED ORAL at 02:17

## 2022-11-06 RX ADMIN — OXYCODONE HYDROCHLORIDE 5 MG: 5 TABLET ORAL at 20:30

## 2022-11-06 RX ADMIN — INSULIN LISPRO 1 UNITS: 100 INJECTION, SOLUTION INTRAVENOUS; SUBCUTANEOUS at 12:37

## 2022-11-06 RX ADMIN — ACETAMINOPHEN 650 MG: 325 TABLET, FILM COATED ORAL at 12:19

## 2022-11-06 RX ADMIN — INSULIN LISPRO 1 UNITS: 100 INJECTION, SOLUTION INTRAVENOUS; SUBCUTANEOUS at 09:01

## 2022-11-06 RX ADMIN — PIPERACILLIN AND TAZOBACTAM 3.38 G: 3; .375 INJECTION, POWDER, LYOPHILIZED, FOR SOLUTION INTRAVENOUS at 19:22

## 2022-11-06 RX ADMIN — INSULIN LISPRO 3 UNITS: 100 INJECTION, SOLUTION INTRAVENOUS; SUBCUTANEOUS at 08:59

## 2022-11-06 NOTE — NURSING NOTE
Pt needed to go for MRI and on Heparin gtt  MRI tech informed this nurse that tubing for MRI was on back order and that MRI could not be completed at this time  This nurse tiger text the provider and provider gave verbal it was ok to hold Heparin for MRI  no redness

## 2022-11-06 NOTE — PLAN OF CARE
Problem: Nutrition/Hydration-ADULT  Goal: Nutrient/Hydration intake appropriate for improving, restoring or maintaining nutritional needs  Description: Monitor and assess patient's nutrition/hydration status for malnutrition  Collaborate with interdisciplinary team and initiate plan and interventions as ordered  Monitor patient's weight and dietary intake as ordered or per policy  Utilize nutrition screening tool and intervene as necessary  Determine patient's food preferences and provide high-protein, high-caloric foods as appropriate       INTERVENTIONS:  - Monitor oral intake, urinary output, labs, and treatment plans  - Assess nutrition and hydration status and recommend course of action  - Evaluate amount of meals eaten  - Assist patient with eating if necessary   - Allow adequate time for meals  - Recommend/ encourage appropriate diets, oral nutritional supplements, and vitamin/mineral supplements  - Order, calculate, and assess calorie counts as needed  - Recommend, monitor, and adjust tube feedings and TPN/PPN based on assessed needs  - Assess need for intravenous fluids  - Provide specific nutrition/hydration education as appropriate  - Include patient/family/caregiver in decisions related to nutrition  Outcome: Progressing     Problem: MOBILITY - ADULT  Goal: Maintain or return to baseline ADL function  Description: INTERVENTIONS:  -  Assess patient's ability to carry out ADLs; assess patient's baseline for ADL function and identify physical deficits which impact ability to perform ADLs (bathing, care of mouth/teeth, toileting, grooming, dressing, etc )  - Assess/evaluate cause of self-care deficits   - Assess range of motion  - Assess patient's mobility; develop plan if impaired  - Assess patient's need for assistive devices and provide as appropriate  - Encourage maximum independence but intervene and supervise when necessary  - Involve family in performance of ADLs  - Assess for home care needs following discharge   - Consider OT consult to assist with ADL evaluation and planning for discharge  - Provide patient education as appropriate  Outcome: Progressing  Goal: Maintains/Returns to pre admission functional level  Description: INTERVENTIONS:  - Perform BMAT or MOVE assessment daily    - Set and communicate daily mobility goal to care team and patient/family/caregiver  - Collaborate with rehabilitation services on mobility goals if consulted  - Perform Range of Motion 2 times a day  - Reposition patient every 2 hours    - Dangle patient 2 times a day  - Stand patient 2 times a day  - Ambulate patient 2 times a day  - Out of bed to chair 2 times a day   - Out of bed for meals 2 times a day  - Out of bed for toileting  - Record patient progress and toleration of activity level   Outcome: Progressing     Problem: Prexisting or High Potential for Compromised Skin Integrity  Goal: Skin integrity is maintained or improved  Description: INTERVENTIONS:  - Identify patients at risk for skin breakdown  - Assess and monitor skin integrity  - Assess and monitor nutrition and hydration status  - Monitor labs   - Assess for incontinence   - Turn and reposition patient  - Assist with mobility/ambulation  - Relieve pressure over bony prominences  - Avoid friction and shearing  - Provide appropriate hygiene as needed including keeping skin clean and dry  - Evaluate need for skin moisturizer/barrier cream  - Collaborate with interdisciplinary team   - Patient/family teaching  - Consider wound care consult   Outcome: Progressing     Problem: PAIN - ADULT  Goal: Verbalizes/displays adequate comfort level or baseline comfort level  Description: Interventions:  - Encourage patient to monitor pain and request assistance  - Assess pain using appropriate pain scale  - Administer analgesics based on type and severity of pain and evaluate response  - Implement non-pharmacological measures as appropriate and evaluate response  - Consider cultural and social influences on pain and pain management  - Notify physician/advanced practitioner if interventions unsuccessful or patient reports new pain  Outcome: Progressing     Problem: INFECTION - ADULT  Goal: Absence or prevention of progression during hospitalization  Description: INTERVENTIONS:  - Assess and monitor for signs and symptoms of infection  - Monitor lab/diagnostic results  - Monitor all insertion sites, i e  indwelling lines, tubes, and drains  - Monitor endotracheal if appropriate and nasal secretions for changes in amount and color  - [place] appropriate cooling/warming therapies per order  - Administer medications as ordered  - Instruct and encourage patient and family to use good hand hygiene technique  - Identify and instruct in appropriate isolation precautions for identified infection/condition  Outcome: Progressing  Goal: Absence of fever/infection during neutropenic period  Description: INTERVENTIONS:  - Monitor WBC    Outcome: Progressing     Problem: SAFETY ADULT  Goal: Maintain or return to baseline ADL function  Description: INTERVENTIONS:  -  Assess patient's ability to carry out ADLs; assess patient's baseline for ADL function and identify physical deficits which impact ability to perform ADLs (bathing, care of mouth/teeth, toileting, grooming, dressing, etc )  - Assess/evaluate cause of self-care deficits   - Assess range of motion  - Assess patient's mobility; develop plan if impaired  - Assess patient's need for assistive devices and provide as appropriate  - Encourage maximum independence but intervene and supervise when necessary  - Involve family in performance of ADLs  - Assess for home care needs following discharge   - Consider OT consult to assist with ADL evaluation and planning for discharge  - Provide patient education as appropriate  Outcome: Progressing  Goal: Maintains/Returns to pre admission functional level  Description: INTERVENTIONS:  - Perform BMAT or MOVE assessment daily    - Set and communicate daily mobility goal to care team and patient/family/caregiver  - Collaborate with rehabilitation services on mobility goals if consulted  - Perform Range of Motion 2 times a day  - Reposition patient every 2 hours    - Dangle patient 2 times a day  - Stand patient 2 times a day  - Ambulate patient 2 times a day  - Out of bed to chair 2 times a day   - Out of bed for meals 2 times a day  - Out of bed for toileting  - Record patient progress and toleration of activity level   Outcome: Progressing  Goal: Patient will remain free of falls  Description: INTERVENTIONS:  - Educate patient/family on patient safety including physical limitations  - Instruct patient to call for assistance with activity   - Consult OT/PT to assist with strengthening/mobility   - Keep Call bell within reach  - Keep bed low and locked with side rails adjusted as appropriate  - Keep care items and personal belongings within reach  - Initiate and maintain comfort rounds  - Make Fall Risk Sign visible to staff  - Offer Toileting every 2 Hours, in advance of need  - Initiate/Maintain bed alarm  - Obtain necessary fall risk management equipment: socks  - Apply yellow socks and bracelet for high fall risk patients  - Consider moving patient to room near nurses station  Outcome: Progressing     Problem: DISCHARGE PLANNING  Goal: Discharge to home or other facility with appropriate resources  Description: INTERVENTIONS:  - Identify barriers to discharge w/patient and caregiver  - Arrange for needed discharge resources and transportation as appropriate  - Identify discharge learning needs (meds, wound care, etc )  - Arrange for interpretive services to assist at discharge as needed  - Refer to Case Management Department for coordinating discharge planning if the patient needs post-hospital services based on physician/advanced practitioner order or complex needs related to functional status, cognitive ability, or social support system  Outcome: Progressing     Problem: Knowledge Deficit  Goal: Patient/family/caregiver demonstrates understanding of disease process, treatment plan, medications, and discharge instructions  Description: Complete learning assessment and assess knowledge base    Interventions:  - Provide teaching at level of understanding  - Provide teaching via preferred learning methods  Outcome: Progressing     Problem: Potential for Falls  Goal: Patient will remain free of falls  Description: INTERVENTIONS:  - Educate patient/family on patient safety including physical limitations  - Instruct patient to call for assistance with activity   - Consult OT/PT to assist with strengthening/mobility   - Keep Call bell within reach  - Keep bed low and locked with side rails adjusted as appropriate  - Keep care items and personal belongings within reach  - Initiate and maintain comfort rounds  - Make Fall Risk Sign visible to staff  - Offer Toileting every 2 Hours, in advance of need  - Initiate/Maintain alarm  - Obtain necessary fall risk management equipment: socks  - Apply yellow socks and bracelet for high fall risk patients  - Consider moving patient to room near nurses station  Outcome: Progressing

## 2022-11-06 NOTE — PLAN OF CARE
Problem: Nutrition/Hydration-ADULT  Goal: Nutrient/Hydration intake appropriate for improving, restoring or maintaining nutritional needs  Description: Monitor and assess patient's nutrition/hydration status for malnutrition  Collaborate with interdisciplinary team and initiate plan and interventions as ordered  Monitor patient's weight and dietary intake as ordered or per policy  Utilize nutrition screening tool and intervene as necessary  Determine patient's food preferences and provide high-protein, high-caloric foods as appropriate       INTERVENTIONS:  - Monitor oral intake, urinary output, labs, and treatment plans  - Assess nutrition and hydration status and recommend course of action  - Evaluate amount of meals eaten  - Assist patient with eating if necessary   - Allow adequate time for meals  - Recommend/ encourage appropriate diets, oral nutritional supplements, and vitamin/mineral supplements  - Order, calculate, and assess calorie counts as needed  - Recommend, monitor, and adjust tube feedings and TPN/PPN based on assessed needs  - Assess need for intravenous fluids  - Provide specific nutrition/hydration education as appropriate  - Include patient/family/caregiver in decisions related to nutrition  Outcome: Progressing     Problem: MOBILITY - ADULT  Goal: Maintain or return to baseline ADL function  Description: INTERVENTIONS:  -  Assess patient's ability to carry out ADLs; assess patient's baseline for ADL function and identify physical deficits which impact ability to perform ADLs (bathing, care of mouth/teeth, toileting, grooming, dressing, etc )  - Assess/evaluate cause of self-care deficits   - Assess range of motion  - Assess patient's mobility; develop plan if impaired  - Assess patient's need for assistive devices and provide as appropriate  - Encourage maximum independence but intervene and supervise when necessary  - Involve family in performance of ADLs  - Assess for home care needs following discharge   - Consider OT consult to assist with ADL evaluation and planning for discharge  - Provide patient education as appropriate  Outcome: Progressing  Goal: Maintains/Returns to pre admission functional level  Description: INTERVENTIONS:  - Perform BMAT or MOVE assessment daily    - Set and communicate daily mobility goal to care team and patient/family/caregiver  - Collaborate with rehabilitation services on mobility goals if consulted  - Perform Range of Motion 3 times a day  - Reposition patient every 2 hours    - Dangle patient 3 times a day  - Stand patient 3 times a day  - Ambulate patient 3 times a day  - Out of bed to chair 3 times a day   - Out of bed for meals 3 times a day  - Out of bed for toileting  - Record patient progress and toleration of activity level   Outcome: Progressing     Problem: Prexisting or High Potential for Compromised Skin Integrity  Goal: Skin integrity is maintained or improved  Description: INTERVENTIONS:  - Identify patients at risk for skin breakdown  - Assess and monitor skin integrity  - Assess and monitor nutrition and hydration status  - Monitor labs   - Assess for incontinence   - Turn and reposition patient  - Assist with mobility/ambulation  - Relieve pressure over bony prominences  - Avoid friction and shearing  - Provide appropriate hygiene as needed including keeping skin clean and dry  - Evaluate need for skin moisturizer/barrier cream  - Collaborate with interdisciplinary team   - Patient/family teaching  - Consider wound care consult   Outcome: Progressing     Problem: PAIN - ADULT  Goal: Verbalizes/displays adequate comfort level or baseline comfort level  Description: Interventions:  - Encourage patient to monitor pain and request assistance  - Assess pain using appropriate pain scale  - Administer analgesics based on type and severity of pain and evaluate response  - Implement non-pharmacological measures as appropriate and evaluate response  - Consider cultural and social influences on pain and pain management  - Notify physician/advanced practitioner if interventions unsuccessful or patient reports new pain  Outcome: Progressing     Problem: INFECTION - ADULT  Goal: Absence or prevention of progression during hospitalization  Description: INTERVENTIONS:  - Assess and monitor for signs and symptoms of infection  - Monitor lab/diagnostic results  - Monitor all insertion sites, i e  indwelling lines, tubes, and drains  - Monitor endotracheal if appropriate and nasal secretions for changes in amount and color  - [place] appropriate cooling/warming therapies per order  - Administer medications as ordered  - Instruct and encourage patient and family to use good hand hygiene technique  - Identify and instruct in appropriate isolation precautions for identified infection/condition  Outcome: Progressing  Goal: Absence of fever/infection during neutropenic period  Description: INTERVENTIONS:  - Monitor WBC    Outcome: Progressing     Problem: SAFETY ADULT  Goal: Maintain or return to baseline ADL function  Description: INTERVENTIONS:  -  Assess patient's ability to carry out ADLs; assess patient's baseline for ADL function and identify physical deficits which impact ability to perform ADLs (bathing, care of mouth/teeth, toileting, grooming, dressing, etc )  - Assess/evaluate cause of self-care deficits   - Assess range of motion  - Assess patient's mobility; develop plan if impaired  - Assess patient's need for assistive devices and provide as appropriate  - Encourage maximum independence but intervene and supervise when necessary  - Involve family in performance of ADLs  - Assess for home care needs following discharge   - Consider OT consult to assist with ADL evaluation and planning for discharge  - Provide patient education as appropriate  Outcome: Progressing  Goal: Maintains/Returns to pre admission functional level  Description: INTERVENTIONS:  - Perform BMAT or MOVE assessment daily    - Set and communicate daily mobility goal to care team and patient/family/caregiver  - Collaborate with rehabilitation services on mobility goals if consulted  - Perform Range of Motion 3 times a day  - Reposition patient every 2 hours    - Dangle patient 3 times a day  - Stand patient 3 times a day  - Ambulate patient 3 times a day  - Out of bed to chair 3 times a day   - Out of bed for meals 3 times a day  - Out of bed for toileting  - Record patient progress and toleration of activity level   Outcome: Progressing  Goal: Patient will remain free of falls  Description: INTERVENTIONS:  - Educate patient/family on patient safety including physical limitations  - Instruct patient to call for assistance with activity   - Consult OT/PT to assist with strengthening/mobility   - Keep Call bell within reach  - Keep bed low and locked with side rails adjusted as appropriate  - Keep care items and personal belongings within reach  - Initiate and maintain comfort rounds  - Make Fall Risk Sign visible to staff  - Offer Toileting every 2 Hours, in advance of need  - Initiate/Maintain alarm  - Obtain necessary fall risk management equipment:   - Apply yellow socks and bracelet for high fall risk patients  - Consider moving patient to room near nurses station  Outcome: Progressing     Problem: DISCHARGE PLANNING  Goal: Discharge to home or other facility with appropriate resources  Description: INTERVENTIONS:  - Identify barriers to discharge w/patient and caregiver  - Arrange for needed discharge resources and transportation as appropriate  - Identify discharge learning needs (meds, wound care, etc )  - Arrange for interpretive services to assist at discharge as needed  - Refer to Case Management Department for coordinating discharge planning if the patient needs post-hospital services based on physician/advanced practitioner order or complex needs related to functional status, cognitive ability, or social support system  Outcome: Progressing     Problem: Knowledge Deficit  Goal: Patient/family/caregiver demonstrates understanding of disease process, treatment plan, medications, and discharge instructions  Description: Complete learning assessment and assess knowledge base    Interventions:  - Provide teaching at level of understanding  - Provide teaching via preferred learning methods  Outcome: Progressing     Problem: Potential for Falls  Goal: Patient will remain free of falls  Description: INTERVENTIONS:  - Educate patient/family on patient safety including physical limitations  - Instruct patient to call for assistance with activity   - Consult OT/PT to assist with strengthening/mobility   - Keep Call bell within reach  - Keep bed low and locked with side rails adjusted as appropriate  - Keep care items and personal belongings within reach  - Initiate and maintain comfort rounds  - Make Fall Risk Sign visible to staff  - Offer Toileting every 2 Hours, in advance of need  - Initiate/Maintain alarm  - Obtain necessary fall risk management equipment:   - Apply yellow socks and bracelet for high fall risk patients  - Consider moving patient to room near nurses station  Outcome: Progressing

## 2022-11-07 LAB
25(OH)D3 SERPL-MCNC: 20.7 NG/ML (ref 30–100)
ALBUMIN SERPL BCP-MCNC: 2.2 G/DL (ref 3.5–5)
ALP SERPL-CCNC: 87 U/L (ref 46–116)
ALT SERPL W P-5'-P-CCNC: 14 U/L (ref 12–78)
ANION GAP SERPL CALCULATED.3IONS-SCNC: 7 MMOL/L (ref 4–13)
APTT PPP: 63 SECONDS (ref 23–37)
BASOPHILS # BLD AUTO: 0.04 THOUSANDS/ÂΜL (ref 0–0.1)
BASOPHILS NFR BLD AUTO: 0 % (ref 0–1)
BILIRUB SERPL-MCNC: 0.9 MG/DL (ref 0.2–1)
BUN SERPL-MCNC: 13 MG/DL (ref 5–25)
CALCIUM ALBUM COR SERPL-MCNC: 12.5 MG/DL (ref 8.3–10.1)
CALCIUM SERPL-MCNC: 11.1 MG/DL (ref 8.3–10.1)
CHLORIDE SERPL-SCNC: 98 MMOL/L (ref 96–108)
CO2 SERPL-SCNC: 33 MMOL/L (ref 21–32)
CREAT SERPL-MCNC: 0.83 MG/DL (ref 0.6–1.3)
EOSINOPHIL # BLD AUTO: 0.02 THOUSAND/ÂΜL (ref 0–0.61)
EOSINOPHIL NFR BLD AUTO: 0 % (ref 0–6)
ERYTHROCYTE [DISTWIDTH] IN BLOOD BY AUTOMATED COUNT: 16.4 % (ref 11.6–15.1)
GFR SERPL CREATININE-BSD FRML MDRD: 94 ML/MIN/1.73SQ M
GLUCOSE SERPL-MCNC: 163 MG/DL (ref 65–140)
GLUCOSE SERPL-MCNC: 176 MG/DL (ref 65–140)
GLUCOSE SERPL-MCNC: 195 MG/DL (ref 65–140)
GLUCOSE SERPL-MCNC: 208 MG/DL (ref 65–140)
GLUCOSE SERPL-MCNC: 226 MG/DL (ref 65–140)
HCT VFR BLD AUTO: 25.1 % (ref 36.5–49.3)
HGB BLD-MCNC: 8.5 G/DL (ref 12–17)
IMM GRANULOCYTES # BLD AUTO: 0.08 THOUSAND/UL (ref 0–0.2)
IMM GRANULOCYTES NFR BLD AUTO: 1 % (ref 0–2)
LYMPHOCYTES # BLD AUTO: 1.76 THOUSANDS/ÂΜL (ref 0.6–4.47)
LYMPHOCYTES NFR BLD AUTO: 11 % (ref 14–44)
MAGNESIUM SERPL-MCNC: 1.5 MG/DL (ref 1.6–2.6)
MCH RBC QN AUTO: 32 PG (ref 26.8–34.3)
MCHC RBC AUTO-ENTMCNC: 33.9 G/DL (ref 31.4–37.4)
MCV RBC AUTO: 94 FL (ref 82–98)
MONOCYTES # BLD AUTO: 1.58 THOUSAND/ÂΜL (ref 0.17–1.22)
MONOCYTES NFR BLD AUTO: 10 % (ref 4–12)
NEUTROPHILS # BLD AUTO: 12.11 THOUSANDS/ÂΜL (ref 1.85–7.62)
NEUTS SEG NFR BLD AUTO: 78 % (ref 43–75)
NRBC BLD AUTO-RTO: 0 /100 WBCS
PLATELET # BLD AUTO: 214 THOUSANDS/UL (ref 149–390)
PMV BLD AUTO: 11 FL (ref 8.9–12.7)
POTASSIUM SERPL-SCNC: 3.2 MMOL/L (ref 3.5–5.3)
PROT SERPL-MCNC: 6.1 G/DL (ref 6.4–8.4)
PTH-INTACT SERPL-MCNC: 9.8 PG/ML (ref 18.4–80.1)
RBC # BLD AUTO: 2.66 MILLION/UL (ref 3.88–5.62)
SODIUM SERPL-SCNC: 138 MMOL/L (ref 135–147)
WBC # BLD AUTO: 15.59 THOUSAND/UL (ref 4.31–10.16)

## 2022-11-07 RX ORDER — POTASSIUM CHLORIDE 20 MEQ/1
40 TABLET, EXTENDED RELEASE ORAL 2 TIMES DAILY
Status: COMPLETED | OUTPATIENT
Start: 2022-11-07 | End: 2022-11-07

## 2022-11-07 RX ORDER — FUROSEMIDE 10 MG/ML
20 INJECTION INTRAMUSCULAR; INTRAVENOUS ONCE
Status: COMPLETED | OUTPATIENT
Start: 2022-11-07 | End: 2022-11-07

## 2022-11-07 RX ORDER — SODIUM CHLORIDE 9 MG/ML
80 INJECTION, SOLUTION INTRAVENOUS CONTINUOUS
Status: DISCONTINUED | OUTPATIENT
Start: 2022-11-07 | End: 2022-11-09

## 2022-11-07 RX ORDER — MAGNESIUM SULFATE HEPTAHYDRATE 40 MG/ML
2 INJECTION, SOLUTION INTRAVENOUS ONCE
Status: COMPLETED | OUTPATIENT
Start: 2022-11-07 | End: 2022-11-07

## 2022-11-07 RX ORDER — CALCITONIN SALMON 200 [USP'U]/ML
4 INJECTION, SOLUTION INTRAMUSCULAR; SUBCUTANEOUS EVERY 12 HOURS SCHEDULED
Status: DISCONTINUED | OUTPATIENT
Start: 2022-11-07 | End: 2022-11-07

## 2022-11-07 RX ADMIN — NICOTINE 1 PATCH: 21 PATCH, EXTENDED RELEASE TRANSDERMAL at 08:24

## 2022-11-07 RX ADMIN — LIDOCAINE 5% 1 PATCH: 700 PATCH TOPICAL at 08:23

## 2022-11-07 RX ADMIN — OXYCODONE HYDROCHLORIDE 5 MG: 5 TABLET ORAL at 03:43

## 2022-11-07 RX ADMIN — POTASSIUM CHLORIDE 40 MEQ: 1500 TABLET, EXTENDED RELEASE ORAL at 18:01

## 2022-11-07 RX ADMIN — MAGNESIUM SULFATE HEPTAHYDRATE 2 G: 2 INJECTION, SOLUTION INTRAVENOUS at 11:10

## 2022-11-07 RX ADMIN — POTASSIUM CHLORIDE 40 MEQ: 1500 TABLET, EXTENDED RELEASE ORAL at 11:00

## 2022-11-07 RX ADMIN — ACETAMINOPHEN 650 MG: 325 TABLET, FILM COATED ORAL at 11:52

## 2022-11-07 RX ADMIN — HEPARIN SODIUM 20 UNITS/KG/HR: 10000 INJECTION, SOLUTION INTRAVENOUS at 03:43

## 2022-11-07 RX ADMIN — LIDOCAINE 5% 1 PATCH: 700 PATCH TOPICAL at 08:24

## 2022-11-07 RX ADMIN — INSULIN LISPRO 3 UNITS: 100 INJECTION, SOLUTION INTRAVENOUS; SUBCUTANEOUS at 08:25

## 2022-11-07 RX ADMIN — SODIUM CHLORIDE 80 ML/HR: 0.9 INJECTION, SOLUTION INTRAVENOUS at 13:57

## 2022-11-07 RX ADMIN — SODIUM CHLORIDE 1000 ML: 0.9 INJECTION, SOLUTION INTRAVENOUS at 11:00

## 2022-11-07 RX ADMIN — INSULIN LISPRO 3 UNITS: 100 INJECTION, SOLUTION INTRAVENOUS; SUBCUTANEOUS at 11:55

## 2022-11-07 RX ADMIN — ZOLEDRONIC ACID 4 MG: 0.04 INJECTION, SOLUTION INTRAVENOUS at 13:57

## 2022-11-07 RX ADMIN — CALCITONIN SALMON 1 UNITS: 200 INJECTION, SOLUTION INTRAMUSCULAR; SUBCUTANEOUS at 12:00

## 2022-11-07 RX ADMIN — FUROSEMIDE 20 MG: 10 INJECTION, SOLUTION INTRAMUSCULAR; INTRAVENOUS at 17:43

## 2022-11-07 RX ADMIN — ACETAMINOPHEN 650 MG: 325 TABLET, FILM COATED ORAL at 20:18

## 2022-11-07 RX ADMIN — PIPERACILLIN AND TAZOBACTAM 3.38 G: 3; .375 INJECTION, POWDER, LYOPHILIZED, FOR SOLUTION INTRAVENOUS at 08:22

## 2022-11-07 RX ADMIN — INSULIN GLARGINE 8 UNITS: 100 INJECTION, SOLUTION SUBCUTANEOUS at 22:30

## 2022-11-07 RX ADMIN — OLANZAPINE 5 MG: 5 TABLET, FILM COATED ORAL at 22:31

## 2022-11-07 RX ADMIN — PIPERACILLIN AND TAZOBACTAM 3.38 G: 3; .375 INJECTION, POWDER, LYOPHILIZED, FOR SOLUTION INTRAVENOUS at 20:20

## 2022-11-07 RX ADMIN — OXYCODONE HYDROCHLORIDE 5 MG: 5 TABLET ORAL at 10:31

## 2022-11-07 RX ADMIN — INSULIN LISPRO 1 UNITS: 100 INJECTION, SOLUTION INTRAVENOUS; SUBCUTANEOUS at 22:31

## 2022-11-07 RX ADMIN — PIPERACILLIN AND TAZOBACTAM 3.38 G: 3; .375 INJECTION, POWDER, LYOPHILIZED, FOR SOLUTION INTRAVENOUS at 01:42

## 2022-11-07 RX ADMIN — INSULIN LISPRO 1 UNITS: 100 INJECTION, SOLUTION INTRAVENOUS; SUBCUTANEOUS at 16:52

## 2022-11-07 RX ADMIN — HEPARIN SODIUM 20 UNITS/KG/HR: 10000 INJECTION, SOLUTION INTRAVENOUS at 22:23

## 2022-11-07 RX ADMIN — INSULIN LISPRO 3 UNITS: 100 INJECTION, SOLUTION INTRAVENOUS; SUBCUTANEOUS at 16:53

## 2022-11-07 RX ADMIN — INSULIN LISPRO 1 UNITS: 100 INJECTION, SOLUTION INTRAVENOUS; SUBCUTANEOUS at 11:53

## 2022-11-07 RX ADMIN — INSULIN LISPRO 2 UNITS: 100 INJECTION, SOLUTION INTRAVENOUS; SUBCUTANEOUS at 08:24

## 2022-11-07 RX ADMIN — PIPERACILLIN AND TAZOBACTAM 3.38 G: 3; .375 INJECTION, POWDER, LYOPHILIZED, FOR SOLUTION INTRAVENOUS at 13:58

## 2022-11-07 NOTE — ASSESSMENT & PLAN NOTE
· Serum glucose on admission is 233   · No prior reported history of DM, however patient poor historian  ·   hemoglobin A1c 9 1  ·   start on SSI

## 2022-11-07 NOTE — CONSULTS
Consultation - Nephrology   Vinicio Segura 58 y o  male MRN: [id number]  Unit/Bed#: -01 Encounter: [id number]    ASSESSMENT/PLAN:    Hypercalcemia  -corrected calcium on admission was 11 5, received IV fluid with downtrending to a yojana of 10 3 on 11/03, now increasing to 12 5 on most recent corrected calcium  -send PTH, vitamin-D hydroxy for completeness  -MRI lumbar spine with scattered enhancing osseous metastases throughout the lumbar spine  -CTA chest PE study with metastatic disease of bilateral ribs, 3 6 cm thick-walled cavitary right upper lobe tumor, 2 8 cm necrotic left lobe tumor  -suspect hypercalcemia related to underlying malignancy, patient does report he takes oral calcium and vitamin-D supplements at home, hold further vitamin-D and calcium supplements while inpatient  -evaluated by Hematology/Oncology 11/3  -given 1 L 0 9% saline this morning by Internal Medicine  -give calcitonin subcu x4 doses for now, consider addition of bisphosphonate therapy pending biopsy/confirmation of malignancy  -spoke with Hematology-Oncology Rosa Mast, no objection to bisphosphonate use    Lung mass  -CTA as noted above  -has pending IR biopsy tentative 11/8  -palliative/Hematology/Oncology/pulmonology consulted    Blood pressure  -Recent blood pressures trends are acceptable    Hypokalemia  -potassium 3 2 this morning, Mag 1 5  -give 40 p o  x2, 2 g IV Mag    Hyponatremia (resolved)  -sodium 120 on admission most recently 138 with IV fluids    Anemia  -Hgb 8 5  -Recommend transfuse for goal greater than 7 per primary team  -hematology/oncology consult  -iron panel from 11/03:  Ferritin greater than 1000, iron sat 43    Lower extremity edema  -has bilateral lower extremity DVT on Doppler  -currently on heparin infusion    Pulmonary embolism  -CTA PE study with few small subsegmental emboli in the right middle lobe right lower lobe and inferior lingula  -currently on heparin infusion per primary team    Elevated serum bicarbonate  -suspect 2nd to volume contraction  -downtrending after IV fluid    SIRS  -meeting SIRS criteria on admission unclear source  -currently on Zosyn    Respiratory insufficiency  -currently on 1 L nasal cannula, nursing reports he desaturates when falling asleep  -monitor respiratory status in setting of IV hydration    Advanced care planning  -evaluated by neuropsych, deemed to not have capacity to make fully informed medical decisions    Additional Medical Problems:  Schizophrenia    HISTORY OF PRESENT ILLNESS:  Requesting Physician: Yaquelin Sands MD  Reason for Consult:  Hypercalcemia    Darius Roberto is a 58y o  year old male who was admitted to [facility] after presenting with generalized weakness/fatigue/ambulatory dysfunction  Was found to have pulmonary embolism/lower extremity DVT on admission as well as hypercalcemia  Had new lung mass concerning for possible malignancy, currently awaiting IR biopsy tentatively planned for 11/8  He was initially given IV hydration with improvement in his calcium level however is again started to increase prompting a renal consultation  He does report he takes calcium and vitamin-D supplements at home, denies any recent nausea/vomiting/diarrhea/dysuria  PAST MEDICAL HISTORY:  Past Medical History:   Diagnosis Date   • Bladder infection        PAST SURGICAL HISTORY:  History reviewed  No pertinent surgical history  ALLERGIES:  No Known Allergies    SOCIAL HISTORY:  Social History     Substance and Sexual Activity   Alcohol Use Not Currently     Social History     Substance and Sexual Activity   Drug Use Never     Social History     Tobacco Use   Smoking Status Current Every Day Smoker   • Packs/day: 1 00   • Types: Cigarettes   Smokeless Tobacco Never Used       FAMILY HISTORY:  History reviewed  No pertinent family history      MEDICATIONS:    Current Facility-Administered Medications:   •  acetaminophen (TYLENOL) tablet 650 mg, 650 mg, Oral, Q8H PRN, Mitch Danielson PA-C, 650 mg at 11/06/22 1219  •  calcitonin (salmon) (MIACALCIN) 1 Unit/0 1 mL skin test 1 Units, 1 Units, Intradermal, Once, EDSON Quintana  •  calcitonin (salmon) (MIACALCIN) injection 290 Units, 4 Units/kg, Subcutaneous, Q12H [facility], EDSON Quintana  •  heparin (porcine) 25,000 units in 0 45% NaCl 250 mL infusion (premix), 3-30 Units/kg/hr (Order-Specific), Intravenous, Titrated, Reyes Buhl, MD, Last Rate: 14 mL/hr at 11/07/22 0600, 20 Units/kg/hr at 11/07/22 0600  •  influenza vaccine, recombinant, quadrivalent (FLUBLOK) IM injection 0 5 mL, 0 5 mL, Intramuscular, Prior to discharge, Mitch Danielson PA-C  •  insulin glargine (LANTUS) subcutaneous injection 8 Units 0 08 mL, 8 Units, Subcutaneous, HS, Haleigh Ramirez MD, 8 Units at 11/06/22 2133  •  insulin lispro (HumaLOG) 100 units/mL subcutaneous injection 1-5 Units, 1-5 Units, Subcutaneous, TID AC, 2 Units at 11/07/22 0824 **AND** Fingerstick Glucose (POCT), , , TID AC, Mitch Danielson PA-C  •  insulin lispro (HumaLOG) 100 units/mL subcutaneous injection 1-5 Units, 1-5 Units, Subcutaneous, HS, Mitch Danielson PA-C, 2 Units at 11/06/22 2133  •  insulin lispro (HumaLOG) 100 units/mL subcutaneous injection 3 Units, 3 Units, Subcutaneous, TID With Meals, Mitch Danielson PA-C, 3 Units at 11/07/22 0825  •  lidocaine (LIDODERM) 5 % patch 1 patch, 1 patch, Topical, Daily, Reyes Buhl, MD, 1 patch at 11/07/22 0824  •  lidocaine (LIDODERM) 5 % patch 1 patch, 1 patch, Topical, Daily, Haleigh Ramirez MD, 1 patch at 11/07/22 2379  •  magnesium sulfate 2 g/50 mL IVPB (premix) 2 g, 2 g, Intravenous, Once, EDSON Quintana, Last Rate: 25 mL/hr at 11/07/22 1110, 2 g at 11/07/22 1110  •  nicotine (NICODERM CQ) 21 mg/24 hr TD 24 hr patch 1 patch, 1 patch, Transdermal, Daily, Mitch Danielson PA-C, 1 patch at 11/07/22 3740  • OLANZapine (ZyPREXA) tablet 5 mg, 5 mg, Oral, HS, Glenford Brunner, MD, 5 mg at 11/06/22 2133  •  ondansetron [facility]) injection 4 mg, 4 mg, Intravenous, Q6H PRN, Ramiro Spray, PA-C  •  oxyCODONE (ROXICODONE) IR tablet 5 mg, 5 mg, Oral, Q6H PRN, Glenford Brunner, MD, 5 mg at 11/07/22 1031  •  piperacillin-tazobactam (ZOSYN) IVPB 3 375 g, 3 375 g, Intravenous, Q6H, Aura Hyatt MD  •  potassium chloride (K-DUR,KLOR-CON) CR tablet 40 mEq, 40 mEq, Oral, BID, Auar Hyatt MD, 40 mEq at 11/07/22 1100  •  senna (SENOKOT) tablet 8 6 mg, 1 tablet, Oral, HS PRN, Ramiro Spray, PA-C  •  sodium chloride 0 9 % bolus 1,000 mL, 1,000 mL, Intravenous, Once, Aura Hyatt MD, Last Rate: 500 mL/hr at 11/07/22 1100, 1,000 mL at 11/07/22 1100    REVIEW OF SYSTEMS:  Review of Systems   Constitutional: Negative  HENT: Negative  Respiratory: Negative  Cardiovascular: Negative  Gastrointestinal: Negative  Genitourinary: Negative  Musculoskeletal: Negative  Skin: Negative  A complete 10 point review of systems was performed and found to be negative unless otherwise noted above or in the HPI  PHYSICAL EXAM:  Current Weight: Weight - Scale: 72 6 kg (160 lb)  First Weight: Weight - Scale: 72 6 kg (160 lb)  Vitals:    11/06/22 1432 11/06/22 1921 11/06/22 1922 11/07/22 0751   BP: 126/78 135/98 135/98 137/99   Pulse: (!) 111 (!) 119 (!) 118 (!) 124   Resp:       Temp: 99 4 °F (37 4 °C)  98 9 °F (37 2 °C) 98 9 °F (37 2 °C)   TempSrc:       SpO2: 97% 94% 94% 92%   Weight:       Height:           Intake/Output Summary (Last 24 hours) at 11/7/2022 1147  Last data filed at 11/7/2022 1100  Gross per 24 hour   Intake 420 ml   Output 1990 ml   Net -1570 ml     Physical Exam  Vitals and nursing note reviewed  Constitutional:       General: He is not in acute distress  Appearance: He is ill-appearing (Chronically)  He is not toxic-appearing or diaphoretic  Comments: Sleeping in bed, awakens to verbal stimuli   HENT:      Head: Normocephalic and atraumatic  Nose: Nose normal       Mouth/Throat:      Mouth: Mucous membranes are moist    Eyes:      General: No scleral icterus  Cardiovascular:      Rate and Rhythm: Normal rate and regular rhythm  Pulses: Normal pulses  Pulmonary:      Effort: Pulmonary effort is normal  No respiratory distress  Breath sounds: No wheezing or rales  Abdominal:      General: Abdomen is flat  Palpations: Abdomen is soft  Musculoskeletal:      Cervical back: Neck supple  Right lower leg: No edema  Left lower leg: No edema  Skin:     General: Skin is warm and dry  Capillary Refill: Capillary refill takes less than 2 seconds  Neurological:      General: No focal deficit present  Mental Status: He is alert  Psychiatric:         Mood and Affect: Mood normal           Invasive Devices:      Lab Results:   Results from last 7 days   Lab Units 11/07/22  0602 11/06/22  0424 11/05/22  1305 11/04/22  0254 11/03/22  1234 11/03/22  0442   WBC Thousand/uL 15 59* 14 39* 13 54* 17 51*   < > 15 50*   HEMOGLOBIN g/dL 8 5* 8 1* 8 9* 8 9*   < > 9 2*   HEMATOCRIT % 25 1* 24 0* 26 3* 26 9*   < > 27 1*   PLATELETS Thousands/uL 214 196 213 195   < > 180   POTASSIUM mmol/L 3 2* 3 1* 3 1* 3 7  --  3 2*   CHLORIDE mmol/L 98 99 96 99  --  97   CO2 mmol/L 33* 35* 36* 37*  --  32   BUN mg/dL 13 10 11 14  --  19   CREATININE mg/dL 0 83 0 70 0 75 0 67  --  0 71   CALCIUM mg/dL 11 1* 9 7 9 5 9 0  --  8 8   MAGNESIUM mg/dL 1 5*  --  1 6 1 8  --  1 5*   PHOSPHORUS mg/dL  --   --   --  3 2  --  3 3   ALK PHOS U/L 87 84 89 94  --  94   ALT U/L 14 14 18 21  --  22   AST U/L  --  12 11 13  --  16    < > = values in this interval not displayed  I have personally reviewed the blood work as stated above and in my note  I have personally reviewed MRI lumbar spine imaging studies    I have personally reviewed internal medicine and oncology note

## 2022-11-07 NOTE — PROGRESS NOTES
[place]  Progress Note Marissa Hall 1960, 58 y o  male MRN: [id number]  Unit/Bed#: -01 Encounter: [id number]  Primary Care Provider: No primary care provider on file  Date and time admitted to hospital: 11/2/2022  9:13 PM    Pedal edema  Assessment & Plan  · B/l pedal edema (R>L) for a couple of days   · Due to probable newly diagnosed metastatic cancer on CT scan, will obtain venous duplex    Hyponatremia  Assessment & Plan  · resolved    Elevated glucose  Assessment & Plan  · Serum glucose on admission is 233   · No prior reported history of DM, however patient poor historian  ·   hemoglobin A1c 9 1  ·   start on SSI    Hypercalcemia  Assessment & Plan  · Corrected calcium 11 5   · Received 2 L of IV fluids in the ER, will place on continuous fluids overnight and recheck CMP in morning  · Will consult nephrology for assistance, likely hypercalcemia of malignancy  Schizophrenia ([facility] [address] )  Assessment & Plan  · Self-reported history of schizophrenia   · Reportedly treated with clonazepam 100 mg HS, however patient reports non adherence with this   · Presented to the ED with acute psychosis requiring Haldol 5 mg IM x1  · Neuro- psychiatry was consulted because of question about patient's capacity  Patient did not seem to have insight into his condition  He does not have capacity to make medical decisions at this time  Abnormal CT scan, gallbladder  Assessment & Plan  · Distended gallbladder on CT scan   · Obtain right upper quadrant ultrasound patient also with elevated T bili and alk-phos  · Completed zosyn  No acute surgical needs by general surgery   No suspicion for cholecystitis per surg    SIRS (systemic inflammatory response syndrome) (HCC)  Assessment & Plan  · Sirs criteria met on admission:  Tachycardia and leukocytosis at 17 36 K without bandemia   · No severe sepsis criteria met   · Suspect reactive from suspected metastatic lung cancer as seen on CT scan   · UA without infection   · Procalcitonin elevated at 11 67-> 3    · ? Elevation secondary to infection or possible small cell lung cancer  · CT scan does show distended gallbladder and with elevated T bili and alk-phos, received IV Zosyn to cover for possible infection  Surgery suggests no acute surgical intervention  · Blood cultures x2, neg  · Dc zosyn  and observe  * Suspected Primary malignant neoplasm of left lung metastatic to other site [facility])  Assessment & Plan  · Presents with generalized weakness, fatigue, and weight loss  · CT a/P: " 2 5 x 2 2 x 2 2 cm mass in the left lower lobe concerning for lung cancer  Lytic lesions within the left 6th rib and pelvis bones compatible with osseous metastasis "  · Longstanding smoking history  · Will ask pulm to evaluate  · Heme-Onc recommended tissue diagnosis with complete staging  They recommended patient will need anticoagulation which will be lifelong based on probable stage IV malignancy causing hypercoagulable state  · IR tissue biopsy pending  · Palliative care is on board         VTE  Prophylaxis:   Pharmacologic: in place    Patient Centered Rounds: I have performed bedside rounds with nursing staff today  Discussions with Specialists or Other Care Team Provider: case management    Education and Discussions with Family / Patient: pt      Current Length of Stay: 4 day(s)    Current Patient Status: Inpatient        Code Status: Level 1 - Full Code      Subjective:   Pt denies complaints  Awaiting biopsy  Limited historian    Patient is seen and examined at bedside  All other ROS are negative  Objective:     Vitals:   Temp (24hrs), Av 2 °F (37 3 °C), Min:98 9 °F (37 2 °C), Max:99 4 °F (37 4 °C)    Temp:  [98 9 °F (37 2 °C)-99 4 °F (37 4 °C)] 98 9 °F (37 2 °C)  HR:  [111-124] 124  Resp:  [16] 16  BP: (126-137)/(78-99) 137/99  SpO2:  [92 %-97 %] 92 %  Body mass index is 23 63 kg/m²       Input and Output Summary (last 24 hours): Intake/Output Summary (Last 24 hours) at 11/7/2022 1035  Last data filed at 11/7/2022 0345  Gross per 24 hour   Intake 420 ml   Output 1790 ml   Net -1370 ml       Physical Exam:       GEN: No acute distress, comfortable, chronically ill   HEEENT: No JVD, PERRLA, no scleral icterus  RESP: Lungs clear to auscultation bilaterally  CV: RRR, +s1/s2   ABD: SOFT NON TENDER, POSITIVE BOWEL SOUNDS, NO DISTENTION  PSYCH: CALM  Poor insight  NEURO:   NO FOCAL DEFICITS  SKIN: NO RASH  EXTREM: NO EDEMA    Additional Data:     Labs:    Results from last 7 days   Lab Units 11/07/22  0602   WBC Thousand/uL 15 59*   HEMOGLOBIN g/dL 8 5*   HEMATOCRIT % 25 1*   PLATELETS Thousands/uL 214   NEUTROS PCT % 78*   LYMPHS PCT % 11*   MONOS PCT % 10   EOS PCT % 0     Results from last 7 days   Lab Units 11/07/22  0602 11/06/22  0424   SODIUM mmol/L 138 136   POTASSIUM mmol/L 3 2* 3 1*   CHLORIDE mmol/L 98 99   CO2 mmol/L 33* 35*   BUN mg/dL 13 10   CREATININE mg/dL 0 83 0 70   ANION GAP mmol/L 7 2*   CALCIUM mg/dL 11 1* 9 7   ALBUMIN g/dL 2 2* 2 0*   TOTAL BILIRUBIN mg/dL 0 90 0 70   ALK PHOS U/L 87 84   ALT U/L 14 14   AST U/L  --  12   GLUCOSE RANDOM mg/dL 208* 167*     Results from last 7 days   Lab Units 11/03/22  1234   INR  1 08     Results from last 7 days   Lab Units 11/07/22  0747 11/06/22  2114 11/06/22  1605 11/06/22  1123 11/06/22  0746 11/05/22  2110 11/05/22  1611 11/05/22  1129 11/05/22  0757 11/04/22  2044 11/04/22  1653 11/04/22  1110   POC GLUCOSE mg/dl 226* 237* 187* 199* 206* 199* 196* 219* 213* 290* 232* 257*     Results from last 7 days   Lab Units 11/02/22  2349   HEMOGLOBIN A1C % 9 1*     Results from last 7 days   Lab Units 11/04/22  0254 11/03/22  0442 11/02/22  2349   LACTIC ACID mmol/L  --   --  1 1   PROCALCITONIN ng/ml 3 26* 10 74* 11 67*           * I Have Reviewed All Lab Data Listed Above             Imaging:     Results for orders placed during the hospital encounter of 11/02/22    XR chest portable    Narrative  CHEST    INDICATION:   Cough, tachycardia, weakness  COMPARISON:  Abdomen CT 11/3/2022, CXR 3/20/2006  EXAM PERFORMED/VIEWS:  XR CHEST PORTABLE      FINDINGS:    Cardiomediastinal silhouette appears unremarkable  2 6 cm solid left lower lobe mass corresponding with the abdomen CT   4 cm cavitary right upper lobe mass  No acute disease  No effusion or pneumothorax  Left lateral 6th rib metastasis corresponding with the abdomen CT probable posterior left 5th rib metastasis  Impression  Right upper lobe cavitary mass, left lower lobe mass, and left rib metastases, some which are visible on the abdomen CT  Findings on the abdomen CT were reported to the emergency physician by Dr Indira Hilliard  Workstation performed: [id number]    No results found for this or any previous visit  *I have reviewed all imaging reports listed above      Recent Cultures (last 7 days):     Results from last 7 days   Lab Units 11/02/22  2349   BLOOD CULTURE  No Growth After 4 Days  No Growth After 4 Days         Last 24 Hours Medication List:   Current Facility-Administered Medications   Medication Dose Route Frequency Provider Last Rate   • acetaminophen  650 mg Oral Q8H PRN Shira Estrella PA-C     • heparin (porcine)  3-30 Units/kg/hr (Order-Specific) Intravenous Titrated Arminda Ortez MD 20 Units/kg/hr (11/07/22 0600)   • influenza vaccine  0 5 mL Intramuscular Prior to discharge Shira Estrella PA-C     • insulin glargine  8 Units Subcutaneous HS Arminda Ortez MD     • insulin lispro  1-5 Units Subcutaneous TID AC Shira Estrella PA-C     • insulin lispro  1-5 Units Subcutaneous HS Shira Estrella PA-C     • insulin lispro  3 Units Subcutaneous TID With Meals Shira Estrella PA-C     • lidocaine  1 patch Topical Daily Arminda Ortez MD     • lidocaine  1 patch Topical Daily Arminda Ortez MD     • nicotine  1 patch Transdermal Daily Fernando Correa PA-C     • OLANZapine  5 mg Oral HS Kal Soto MD     • ondansetron  4 mg Intravenous Q6H PRN Fernando Correa PA-C     • oxyCODONE  5 mg Oral Q6H PRN Kal Soto MD     • piperacillin-tazobactam  3 375 g Intravenous Q6H Cameron Benítez MD     • potassium chloride  40 mEq Oral BID Cameron Benítez MD     • senna  1 tablet Oral HS PRN Fernando Correa PA-C     • sodium chloride  1,000 mL Intravenous Once Cameron Benítez MD          Today, Patient Was Seen By: Cameron Benítez    ** Please Note: Dictation voice to text software may have been used in the creation of this document   **

## 2022-11-07 NOTE — ACP (ADVANCE CARE PLANNING)
Serious Illness Conversation    1  What is your understanding now of where you are with your illness? Prognostic Understanding: appropriate understanding of prognosis     2  How much information about what is likely to be ahead with your illness would you like to have? Information: patient wants to be informed of big picture, but not details     3  What did you (clinician) communicate to the patient? Prognostic Communication: Uncertain - It can be difficult to predict what will happen with your illness  I hope you will continue to live well for a long time but I’m worried that you could get sick quickly, and I think it is important to prepare for that possibility  4  If your health situation worsens, what are your most important goals? Goals: be physically comfortable     5  What are the biggest fears and worries about the future and your health? Fears/Worries: pain     6  What abilities are so critical to your life that you cannot imagine living without them? Unacceptable Function: not being able to care for myself, including toileting and feeding     7  What gives you strength as you think about the future with your illness? 8  If you become sicker, how much are you willing to go through for the possibility of gaining more time? 9  How much does your proxy and family know about your priorities and wishes? Does not have family        How does this plan sound to you? I will do everything I can to help you through this       I have spent  13 minutes speaking with my patient on advanced care planning today or during this visit     Advanced directives

## 2022-11-07 NOTE — ASSESSMENT & PLAN NOTE
· Sirs criteria met on admission:  Tachycardia and leukocytosis at 17 36 K without bandemia   · No severe sepsis criteria met   · Suspect reactive from suspected metastatic lung cancer as seen on CT scan   · UA without infection   · Procalcitonin elevated at 11 67-> 3    · ? Elevation secondary to infection or possible small cell lung cancer  · CT scan does show distended gallbladder and with elevated T bili and alk-phos, received IV Zosyn to cover for possible infection  Surgery suggests no acute surgical intervention  · Blood cultures x2, neg  · Dc zosyn 11/7 and observe

## 2022-11-07 NOTE — PROGRESS NOTES
Medical Oncology/Hematology Progress Note  Alex Rosales, male, 58 y  o , 1960,  /-[birth year], [id number]     Assessment and Plan  1  Abnormal Imaging suspicious for primary lung cancer with metastatic disease    11/3/22 CT AP:   2 5 x 2 2 x 2 2 cm mass in the left lower lobe concerning for lung cancer  Lytic lesions within the left 6th rib and pelvis bones compatible with osseous metastasis  small left pleural effusion with adjacent atelectasis    11/3/22 CTA chest:  3 6 cm thick walled cavitary right upper lobe tumor and 2 8 cm necrotic left lower lobe tumor  Ill-defined infiltration of the mediastinal fat likely due to tumor with enlarged left hilar node, likely metastatic  Metastatic disease to bilateral ribs and T6 and T8 with severe compression deformities  A few small subsegmental emboli in the right middle lobe, right lower lobe, and inferior lingula  11/6/22 MRI Lumbar Spine:  Scattered enhancing osseous metastases throughout the lumbar spine most notable at the T12 and L2-L4 vertebral bodies  Bilateral iliac bone lesions, partially imaged  There is no pathologic compression fracture    Moderate L5-S1 disc bulge with moderate bilateral subarticular/lateral recess narrowing  Moderate to severe bilateral L5-S1 neural foraminal narrowing with encroachment of the exiting L5 nerve roots    Above all concerning for stage IV lung cancer  Tissue sample would be necessary in order to make a diagnosis  · IR has been consulted and patient is scheduled for biopsy tomorrow  Patient will also undergo thoracentesis for both diagnostic and therapeutic purposes  Treatment plan to be developed once tissue diagnosis is established  Of note, given evidence of widely metastatic disease, patient has incurable cancer  Treatment would be palliative in nature  · Appreciative palliative care's assistance    Given patient's lack of competency, he will need decision maker involved in discussion regarding pursuit of treatment      2  Hypercalcemia   Serum calcium elevated on admission 10 6, corrected calcium 11 5  Serum calcium has risen today 11 1, corrected calcium 12 5  · Suspect this is hypercalcemia secondary to malignancy  Imaging demonstrates multiple bony lesions  · PTH pending  · Nephrology following  Appreciate recs  · Hematology agrees with Zometa 4 mg IV per Nephrology    3  Pulmonary embolism  4  Bilateral acute DVT  Patient was found to have pulmonary embolism on CTA of chest as well as bilateral acute DVTs  Suspect hypercoagulable state from underlying malignancy  Presently on heparin drip  He can be transition[facility] to [facility] upon discharge  He will require lifelong anticoagulation given that he has stage IV malignancy      Please see initial hospital consult note dated 11/3/2022for admission details  HPI: Darius Roberto is a 58y o  year old male with a history of schizophrenia,  who presented 11/2/2022 for symptoms of generalized weakness and fatigue  Imaging demonstrates findings of bilateral lesions in the lungs with left pleural effusion and multiple bony lesions all concerning for metastasis  He was also found to have pulmonary embolism and bilateral acute lower extremity DVT  He is scheduled to undergo IR bone biopsy as well as thoracentesis tomorrow  He has been seen by behavioral health and results of neuro psychological exam revealed diffuse cognitive dysfunction  He was deemed to not have capacity to make informed medical decisions secondary to underlying schizophrenia        Review of Systems   Unable to perform ROS: Other         Interval History:   Patient seen and examined  He was sleeping comfortably at time of visit  He was not awoken  EMR reviewed    He is plan for biopsied and therapeutic thoracentesis tomorrow        /99   Pulse (!) 124   Temp 99 2 °F (37 3 °C) (Axillary)   Resp 16   Ht 5' 9" (1 753 m)   Wt 72 6 kg (160 lb)   SpO2 92%   BMI 23 63 kg/m²       Physical Exam  Vitals reviewed  Constitutional:       Comments: Patient is sleeping at time of visit  No acute distress  HENT:      Head: Normocephalic and atraumatic  Cardiovascular:      Rate and Rhythm: Normal rate and regular rhythm  Pulmonary:      Effort: Pulmonary effort is normal  No respiratory distress           Recent Results (from the past 48 hour(s))   Fingerstick Glucose (POCT)    Collection Time: 11/05/22  4:11 PM   Result Value Ref Range    POC Glucose 196 (H) 65 - 140 mg/dl   Fingerstick Glucose (POCT)    Collection Time: 11/05/22  9:10 PM   Result Value Ref Range    POC Glucose 199 (H) 65 - 140 mg/dl   APTT    Collection Time: 11/06/22  4:24 AM   Result Value Ref Range    PTT 80 (H) 23 - 37 seconds   CBC and differential    Collection Time: 11/06/22  4:24 AM   Result Value Ref Range    WBC 14 39 (H) 4 31 - 10 16 Thousand/uL    RBC 2 60 (L) 3 88 - 5 62 Million/uL    Hemoglobin 8 1 (L) 12 0 - 17 0 g/dL    Hematocrit 24 0 (L) 36 5 - 49 3 %    MCV 92 82 - 98 fL    MCH 31 2 26 8 - 34 3 pg    MCHC 33 8 31 4 - 37 4 g/dL    RDW 16 5 (H) 11 6 - 15 1 %    MPV 9 8 8 9 - 12 7 fL    Platelets 025 195 - 650 Thousands/uL    nRBC 0 /100 WBCs    Neutrophils Relative 74 43 - 75 %    Immat GRANS % 1 0 - 2 %    Lymphocytes Relative 15 14 - 44 %    Monocytes Relative 9 4 - 12 %    Eosinophils Relative 1 0 - 6 %    Basophils Relative 0 0 - 1 %    Neutrophils Absolute 10 72 (H) 1 85 - 7 62 Thousands/µL    Immature Grans Absolute 0 15 0 00 - 0 20 Thousand/uL    Lymphocytes Absolute 2 10 0 60 - 4 47 Thousands/µL    Monocytes Absolute 1 31 (H) 0 17 - 1 22 Thousand/µL    Eosinophils Absolute 0 07 0 00 - 0 61 Thousand/µL    Basophils Absolute 0 04 0 00 - 0 10 Thousands/µL   Comprehensive metabolic panel    Collection Time: 11/06/22  4:24 AM   Result Value Ref Range    Sodium 136 135 - 147 mmol/L    Potassium 3 1 (L) 3 5 - 5 3 mmol/L    Chloride 99 96 - 108 mmol/L    CO2 35 (H) 21 - 32 mmol/L    ANION GAP 2 (L) 4 - 13 mmol/L BUN 10 5 - 25 mg/dL    Creatinine 0 70 0 60 - 1 30 mg/dL    Glucose 167 (H) 65 - 140 mg/dL    Calcium 9 7 8 3 - 10 1 mg/dL    Corrected Calcium 11 3 (H) 8 3 - 10 1 mg/dL    AST 12 5 - 45 U/L    ALT 14 12 - 78 U/L    Alkaline Phosphatase 84 46 - 116 U/L    Total Protein 5 5 (L) 6 4 - 8 4 g/dL    Albumin 2 0 (L) 3 5 - 5 0 g/dL    Total Bilirubin 0 70 0 20 - 1 00 mg/dL    eGFR 101 ml/min/1 73sq m   Fingerstick Glucose (POCT)    Collection Time: 11/06/22  7:46 AM   Result Value Ref Range    POC Glucose 206 (H) 65 - 140 mg/dl   Fingerstick Glucose (POCT)    Collection Time: 11/06/22 11:23 AM   Result Value Ref Range    POC Glucose 199 (H) 65 - 140 mg/dl   Fingerstick Glucose (POCT)    Collection Time: 11/06/22  4:05 PM   Result Value Ref Range    POC Glucose 187 (H) 65 - 140 mg/dl   Fingerstick Glucose (POCT)    Collection Time: 11/06/22  9:14 PM   Result Value Ref Range    POC Glucose 237 (H) 65 - 140 mg/dl   CBC and differential    Collection Time: 11/07/22  6:02 AM   Result Value Ref Range    WBC 15 59 (H) 4 31 - 10 16 Thousand/uL    RBC 2 66 (L) 3 88 - 5 62 Million/uL    Hemoglobin 8 5 (L) 12 0 - 17 0 g/dL    Hematocrit 25 1 (L) 36 5 - 49 3 %    MCV 94 82 - 98 fL    MCH 32 0 26 8 - 34 3 pg    MCHC 33 9 31 4 - 37 4 g/dL    RDW 16 4 (H) 11 6 - 15 1 %    MPV 11 0 8 9 - 12 7 fL    Platelets 275 478 - 076 Thousands/uL    nRBC 0 /100 WBCs    Neutrophils Relative 78 (H) 43 - 75 %    Immat GRANS % 1 0 - 2 %    Lymphocytes Relative 11 (L) 14 - 44 %    Monocytes Relative 10 4 - 12 %    Eosinophils Relative 0 0 - 6 %    Basophils Relative 0 0 - 1 %    Neutrophils Absolute 12 11 (H) 1 85 - 7 62 Thousands/µL    Immature Grans Absolute 0 08 0 00 - 0 20 Thousand/uL    Lymphocytes Absolute 1 76 0 60 - 4 47 Thousands/µL    Monocytes Absolute 1 58 (H) 0 17 - 1 22 Thousand/µL    Eosinophils Absolute 0 02 0 00 - 0 61 Thousand/µL    Basophils Absolute 0 04 0 00 - 0 10 Thousands/µL   Comprehensive metabolic panel    Collection Time: 11/07/22  6:02 AM   Result Value Ref Range    Sodium 138 135 - 147 mmol/L    Potassium 3 2 (L) 3 5 - 5 3 mmol/L    Chloride 98 96 - 108 mmol/L    CO2 33 (H) 21 - 32 mmol/L    ANION GAP 7 4 - 13 mmol/L    BUN 13 5 - 25 mg/dL    Creatinine 0 83 0 60 - 1 30 mg/dL    Glucose 208 (H) 65 - 140 mg/dL    Calcium 11 1 (H) 8 3 - 10 1 mg/dL    Corrected Calcium 12 5 (HH) 8 3 - 10 1 mg/dL    AST      ALT 14 12 - 78 U/L    Alkaline Phosphatase 87 46 - 116 U/L    Total Protein 6 1 (L) 6 4 - 8 4 g/dL    Albumin 2 2 (L) 3 5 - 5 0 g/dL    Total Bilirubin 0 90 0 20 - 1 00 mg/dL    eGFR 94 ml/min/1 73sq m   Magnesium    Collection Time: 11/07/22  6:02 AM   Result Value Ref Range    Magnesium 1 5 (L) 1 6 - 2 6 mg/dL   APTT    Collection Time: 11/07/22  6:02 AM   Result Value Ref Range    PTT 63 (H) 23 - 37 seconds   Fingerstick Glucose (POCT)    Collection Time: 11/07/22  7:47 AM   Result Value Ref Range    POC Glucose 226 (H) 65 - 140 mg/dl   Fingerstick Glucose (POCT)    Collection Time: 11/07/22 11:22 AM   Result Value Ref Range    POC Glucose 163 (H) 65 - 140 mg/dl       XR chest portable    Result Date: 11/3/2022  Narrative: CHEST INDICATION:   Cough, tachycardia, weakness  COMPARISON:  Abdomen CT 11/3/2022, CXR 3/20/2006  EXAM PERFORMED/VIEWS:  XR CHEST PORTABLE FINDINGS: Cardiomediastinal silhouette appears unremarkable  2 6 cm solid left lower lobe mass corresponding with the abdomen CT   4 cm cavitary right upper lobe mass  No acute disease  No effusion or pneumothorax  Left lateral 6th rib metastasis corresponding with the abdomen CT probable posterior left 5th rib metastasis  Impression: Right upper lobe cavitary mass, left lower lobe mass, and left rib metastases, some which are visible on the abdomen CT  Findings on the abdomen CT were reported to the emergency physician by Dr Richy Mancuso   Workstation performed: [id number]     MRI lumbar spine w wo contrast    Result Date: 11/6/2022  Narrative: MRI LUMBAR SPINE WITH AND WITHOUT CONTRAST INDICATION: Lower extremity weakness in patient with lytic bone lesion concerning for metastasis  COMPARISON:  CTA chest 11/3/2022 TECHNIQUE:  Sagittal T1, sagittal T2, sagittal inversion recovery, axial T1 and axial T2, coronal T2  Sagittal and axial T1 postcontrast  IV Contrast:  7 mL of Gadobutrol injection (SINGLE-DOSE) IMAGE QUALITY:  Diagnostic FINDINGS: VERTEBRAL BODIES:  There are 5 lumbar type vertebral bodies  Normal alignment of the lumbar spine  No spondylolysis or spondylolisthesis  No scoliosis  No compression fracture  Diffuse marrow signal heterogeneity with superimposed scattered enhancing, hypointense T1/T2 lesions suspicious for osseous metastases  The most prominent lesions are measured :Diffuse lesion involving the T12 vertebral body, 2 5 cm lesion anterior L2 vertebral body, 2 1 cm lesion involving the right L3 vertebral body and pedicle, 2 cm lesions involving the posterior superior L4 vertebral body  SACRUM:  Hypointense T1/T2 lesions involving the posterior iliac bones, partially imaged  DISTAL CORD AND CONUS:  Normal size and signal within the distal cord and conus  The conus terminates at the L1 level  The cauda equina nerve roots appear within normal limits  PARASPINAL SOFT TISSUES:  Mild edema within the posterior paraspinal muscles  LOWER THORACIC DISC SPACES:  Normal disc height and signal   No disc herniation, canal stenosis or foraminal narrowing  LUMBAR DISC SPACES: L1-2: No focal disc herniation, central canal stenosis, or neural foraminal narrowing  L2-3: Bilateral foraminal protrusions  No central canal or subarticular/lateral recess narrowing  No neural foraminal stenosis  L3-4: Disc bulge extending into the foraminal regions  Bilateral ligamentum flavum and facet hypertrophy  No central canal or neural foraminal stenosis  L4-5: Disc bulge extending into the foraminal regions  Bilateral ligamentum flavum and facet hypertrophy    No central canal or neural foraminal stenosis  L5-S1: Moderate diffuse disc bulge with spondylotic ridging  No central canal stenosis  Moderate bilateral subarticular/lateral recess narrowing  Moderate to severe bilateral neural foraminal stenosis with encroachment of the exiting L5 nerve roots  Visualized abdominal and pelvic contents: No suspicious abnormality  Impression: 1  Scattered enhancing osseous metastases throughout the lumbar spine most notable at the T12 and L2-L4 vertebral bodies  Bilateral iliac bone lesions, partially imaged  2  There is no pathologic compression fracture  3  Moderate L5-S1 disc bulge with moderate bilateral subarticular/lateral recess narrowing  Moderate to severe bilateral L5-S1 neural foraminal narrowing with encroachment of the exiting L5 nerve roots  Workstation performed: [id number]     CTA chest pe study    Result Date: 11/3/2022  Narrative: CTA - CHEST WITH IV CONTRAST - PULMONARY ANGIOGRAM INDICATION:   b/l DVTs, new lung cancer, looking for PE but also further staging of malignancy  COMPARISON: CXR 11/2/2022 and abdomen CT 11/3/2022  Small pleural effusions, larger on the left  TECHNIQUE: CT angiogram timed for optimal opacification of the pulmonary arteries  Axial, sagittal, and coronal 2D reformats created from source data  Coronal 3D MIP postprocessing on the acquisition scanner  Radiation dose length product (DLP):  315 28 mGy-cm   Radiation dose exposure minimized using iterative reconstruction and automated exposure control  IV Contrast:  85 mL of iohexol (OMNIPAQUE)  FINDINGS: PULMONARY ARTERIES:  A few small subsegmental emboli in the right middle lobe, right lower lobe, and inferior lingula, marked on series 2  LUNGS:  3 6 cm cavitary thick-walled mass in the right upper lobe (3/31)  2 8 cm necrotic nodule in the posterior basal left lower lobe (3/69)  Mild emphysema  Minimal dependent atelectasis in the lower lobes   AIRWAYS: No significant filling defects  PLEURA:  Small effusions, greater on the left  HEART/GREAT VESSELS:  Normal heart size  RV/LV diameter ratio less than 1 with nothing to indicate right heart strain  Mild coronary artery calcification indicating atherosclerotic heart disease  Trace pericardial effusion  MEDIASTINUM AND JAYESH:  Ill-defined infiltration of the mediastinal fat  Enlarged left hilar node with a short axis of 1 6 cm  CHEST Unremarkable  R NECK: Unremarkable  UPPER ABDOMEN:  Persistent gallbladder distention, incompletely imaged  OSSEOUS STRUCTURES:  Lytic metastasis to the left 5th rib posteriorly and laterally and to the lateral 6th rib  Question metastatic disease to the left posterior 8th rib at the costovertebral junction  Probable sclerotic metastasis to the lateral right  6th rib  Moderate compression deformities of T6 and T8, likely due to metastatic disease, with mild retropulsion of bone into the spinal canal at the level of T8  Impression: A few small subsegmental emboli in the right middle lobe, right lower lobe, and inferior lingula  3 6 cm thick walled cavitary right upper lobe tumor and 2 8 cm necrotic left lower lobe tumor  Ill-defined infiltration of the mediastinal fat likely due to tumor with enlarged left hilar node, likely metastatic  Small effusions  Metastatic disease to bilateral ribs and T6 and T8 with severe compression deformities  Given mild retropulsion of bone into the spinal canal at the level of T8, recommend further evaluation with MRI to evaluate for cord compression  Persistent gallbladder distention, incompletely imaged  I notified Landen Roman on 11/3/2022 at 8:51 PM by secure text and she responded immediately  Workstation performed: [id number]     US right upper quadrant    Result Date: 11/3/2022  Narrative: RIGHT UPPER QUADRANT ULTRASOUND INDICATION:     distended gallbladder, elevated tbili and alk phos   COMPARISON:  11/3/2022 TECHNIQUE:   Real-time ultrasound of the right upper quadrant was performed with a curvilinear transducer with both volumetric sweeps and still imaging techniques  FINDINGS: PANCREAS:  There is mild prominence of the pancreatic duct which is likely related to partial divisum on CT  AORTA AND IVC:  Visualized portions are normal for patient age  LIVER: Size:  Moderately enlarged  The liver measures 21 0 cm in the midclavicular line  Contour:  Surface contour is smooth  Parenchyma:  Echogenicity and echotexture are within normal limits  No liver mass identified  Limited imaging of the main portal vein shows it to be patent and hepatopetal   BILIARY: The gallbladder is distended  Wall is top normal in caliber though without wall edema  There is trace pericholecystic fluid  There is layering sludge without evidence for shadowing calculi  Sonographic Emerson's sign cannot be accurately assessed because patient had recent pain medication administration, thus limiting ultrasound assessment of acute cholecystitis  No intrahepatic biliary dilatation  CBD measures 5 0 mm  No choledocholithiasis  KIDNEY: Right kidney measures 11 5 x 6 1 x 6 1 cm  Volume 224 8 mL Kidney within normal limits  ASCITES:   None  Impression: Distended gallbladder with sludge, top normal wall and trace pericholecystic fluid though no wall edema or stones and Emerson's sign could not be assessed due to medication  These findings may represent acute cholecystitis in the appropriate clinical setting though HIDA scan can be performed for confirmation of clinically indicated  The study was marked in [facility] for immediate notification  Workstation performed: [id number]     VAS lower limb venous duplex study, complete bilateral    Result Date: 11/3/2022  Narrative:  [facility] REPORT CLINICAL: Indications: Patient presents with bilateral pedal edema x  1 week    FINDINGS:  Segment        Right                   Left                          Impression              Impression Gastrocnemius  Occlusive Subsegmental  Occlusive Subsegmental  Calf Veins     Occlusive Subsegmental                             CONCLUSION:  Impression: RIGHT LOWER LIMB: Evaluation shows acute occlusive thrombus in the gastrocnemius veins from behind the knee to proximal calf and in the soleal vein from proximal to mid calf  No evidence of superficial thrombophlebitis noted  Doppler evaluation shows a normal response to augmentation maneuvers  Popliteal, posterior tibial and anterior tibial arterial Doppler waveforms are triphasic  LEFT LOWER LIMB: Evaluation shows acute occlusive thrombus in the gastrocnemius veins from behind the knee to proximal calf  No evidence of superficial thrombophlebitis noted  Doppler evaluation shows a normal response to augmentation maneuvers  Popliteal, posterior tibial and anterior tibial arterial Doppler waveforms are triphasic  Technical findings were given to GALEN Knox on 11/3/2022 @ 1045  SIGNATURE: Electronically Signed by: Sophia Harris on 2022-11-03 02:12:15 PM    CT abdomen pelvis with contrast    Result Date: 11/3/2022  Narrative: CT ABDOMEN AND PELVIS WITH IV CONTRAST INDICATION:   Sepsis Sepsis rule out infection  Poor historian  COMPARISON:  CT of abdomen pelvis on March 20, 2006  TECHNIQUE:  CT examination of the abdomen and pelvis was performed  Axial, sagittal, and coronal 2D reformatted images were created from the source data and submitted for interpretation  Radiation dose length product (DLP) for this visit:  487 6 mGy-cm   This examination, like all CT scans performed in the [facility], was performed utilizing techniques to minimize radiation dose exposure, including the use of iterative reconstruction and automated exposure control  IV Contrast:  100 mL of iohexol (OMNIPAQUE) Enteric Contrast:  Enteric contrast was not administered   FINDINGS: ABDOMEN LOWER CHEST:  2 5 x 2 2 x 2 2 cm mass in the left lower lobe (series 2, image 6) concerning for malignancy  There is a small left pleural effusion with adjacent atelectasis  There is a small anterior pericardial effusion  Coronary artery calcifications  LIVER/BILIARY TREE:  Unremarkable  GALLBLADDER:  Distended  No calcified gallstones  No pericholecystic inflammatory change  SPLEEN:  Unremarkable  PANCREAS:  Unremarkable  ADRENAL GLANDS:  Unremarkable  KIDNEYS/URETERS:  There are small wedge-shaped hypodensities measuring up to 1 3 cm within the lower pole of the right kidney and the upper pole of the left kidney which may be due to scarring versus small renal infarctions  No hydronephrosis  STOMACH AND BOWEL:  Unremarkable  APPENDIX:  No findings to suggest appendicitis  ABDOMINOPELVIC CAVITY:  No ascites  No pneumoperitoneum  No lymphadenopathy  VESSELS:  Moderate atherosclerotic disease of the abdominal aorta  There is narrowing of the distal abdominal aorta, the lumen measuring up to 8 mm in diameter just prior to the bifurcation  PELVIS REPRODUCTIVE ORGANS:  Enlarged prostate  URINARY BLADDER:  Unremarkable  ABDOMINAL WALL/INGUINAL REGIONS:  Unremarkable  OSSEOUS STRUCTURES:  There are lytic lesions compatible with metastasis  For example, there is a lytic lesion measuring 1 9 cm in the left iliac bone (series 2, image 57)  Additional smaller lytic lesions are seen in the left iliac bone (series 2, image 53)  A small lytic lesion seen within the left sacral ala  2 6 x 1 9 cm lytic expansile lesion in the left lateral 6th rib  Possible vague lytic lesions are seen within the L4 vertebral body  Impression: 1   2 5 x 2 2 x 2 2 cm mass in the left lower lobe concerning for lung cancer  2   Lytic lesions within the left 6th rib and pelvis bones compatible with osseous metastasis  3   2   Small left pleural effusion  4   3   Small anterior pericardial effusion  5   Small wedge-shaped hypodensities within bilateral kidneys may reflect scarring versus small renal infarcts   6  Other findings as above  I personally discussed this study with Dr Rogelio Connors on 11/3/2022 at 1:54 AM  Workstation performed: [id number]     Echo complete w/ contrast if indicated    Result Date: 11/4/2022  Narrative: •  Left Ventricle: Left ventricular cavity size is normal  Wall thickness is normal  The left ventricular ejection fraction is 65% by visual estimation  Wall motion is normal  Diastolic function is normal  •  Pericardium: There is a small pericardial effusion along the right atrial free wall  There is a small likelihood of cardiac tamponade  The evidence for tamponade includes: right atrial inversion  I have personally reviewed labs, imaging studies, and pertinent reports  This note has been generated by voice recognition software system  Therefore, there may be spelling, grammar, and or syntax errors  Please contact if questions arise

## 2022-11-07 NOTE — PROGRESS NOTES
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  Hilar metastasis  Retropulsion of bone into spinal canal  SIRS  Pulmonary emboli  Acute DVT   Hypercalcemia  Hyponatremia  Schizophrenia    Plan:  1  Symptom management - defer to primary team at this time    -Pain     -managed by primary     -Well-controlled with oxycodone 5 mg     -4 PRN doses in past 24 hrs     -Will continue to monitor for signs of uncontrolled pain and step in for management if needed  2  Goals -      -During previous discussion on , the patient did not have understanding of his diagnosis and repeatedly asked to leave AMA  -Today, he was aware of and agreeable to biopsy scheduled for Tuesday  However, not competent on exam      -In the absence of advanced directives with a designated HCA or POA paperwork, [place] the following hierarchy:  1  Spouse-none reported  2  Adult children-none  3  Parents-  4  Siblings-sister who is estranged  5  Adult grandchildren-none reported  6  Any adult who has knowledge of the patient preference and values  - Patient has close friend, Mr John Jessica, who knows pt and his wishes well  Mr John Jessica is  decision-maker at this time  He is agreeable to biopsy at this time  He is able to sign consents      -Further goals of care discussions pending results of biopsy  Palliative care will continue to follow  Code Status: Full - Level 1   Decisional apparatus:  Patient is not competent on my exam today    If competence is lost, patient's substitute decision maker would default to close friends [place]  Advance Directive / Living Will / POLST:  None on file    Interval history:       Upon entering the room, patient is laying in bed comfortably  He remembers meeting with palliative care last week, but cannot remember specific details of the conversation  He is aware of biopsy scheduled for Tuesday, but asks several times if all of his symptoms could be caused by an infection  When told that he has a fever, he waves his arm and says "it will pass " He states that he just wants to go back home and mentions his time spent in the [employer]  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 " However, he is agreeable to a biopsy prior to leaving the hospital         He denies a spouse, children, parents, siblings, or adult grandchildren and states that the closest person in his life is his friend, Heidi Hightower  He would like Pavel to continue to receive medical updates and be involved in his care  When asked about symptoms, he states that he has 9/10 pain primarily located in his abdomen  However, he reports improvement from time of admission and that he feels it is well-controlled by his medication regimen  He denies other symptoms at this time  After visit with the patient, close friend Heidi Hightower is called for an update  He is aware of Cristino's current condition and agreeable with proceeding with a biopsy  He states that the patient is a fighter  He provides information about Vanessa gannon ([phone]), at their [employer] ([address] [place] [address]) who is also close with the patient      MEDICATIONS / ALLERGIES:     current meds:   Current Facility-Administered Medications   Medication Dose Route Frequency   • acetaminophen (TYLENOL) tablet 650 mg  650 mg Oral Q8H PRN   • calcitonin (salmon) (MIACALCIN) injection 290 Units  4 Units/kg Subcutaneous Q12H [address]   • heparin (porcine) 25,000 units in 0 45% NaCl 250 mL infusion (premix)  3-30 Units/kg/hr (Order-Specific) Intravenous Titrated   • influenza vaccine, recombinant, quadrivalent (FLUBLOK) IM injection 0 5 mL  0 5 mL Intramuscular Prior to discharge   • insulin glargine (LANTUS) subcutaneous injection 8 Units 0 08 mL  8 Units Subcutaneous HS   • insulin lispro (HumaLOG) 100 units/mL subcutaneous injection 1-5 Units  1-5 Units Subcutaneous TID AC   • insulin lispro (HumaLOG) 100 units/mL subcutaneous injection 1-5 Units  1-5 Units Subcutaneous HS   • insulin lispro (HumaLOG) 100 units/mL subcutaneous injection 3 Units  3 Units Subcutaneous TID With Meals   • lidocaine (LIDODERM) 5 % patch 1 patch  1 patch Topical Daily   • lidocaine (LIDODERM) 5 % patch 1 patch  1 patch Topical Daily   • magnesium sulfate 2 g/50 mL IVPB (premix) 2 g  2 g Intravenous Once   • nicotine (NICODERM CQ) 21 mg/24 hr TD 24 hr patch 1 patch  1 patch Transdermal Daily   • OLANZapine (ZyPREXA) tablet 5 mg  5 mg Oral HS   • ondansetron (ZOFRAN) injection 4 mg  4 mg Intravenous Q6H PRN   • oxyCODONE (ROXICODONE) IR tablet 5 mg  5 mg Oral Q6H PRN   • piperacillin-tazobactam (ZOSYN) IVPB 3 375 g  3 375 g Intravenous Q6H   • potassium chloride (K-DUR,KLOR-CON) CR tablet 40 mEq  40 mEq Oral BID   • senna (SENOKOT) tablet 8 6 mg  1 tablet Oral HS PRN   • sodium chloride 0 9 % bolus 1,000 mL  1,000 mL Intravenous Once       No Known Allergies    Review of Systems - unable to perform ROS: psychiatric disorder    OBJECTIVE:    Physical Exam  Physical Exam  Constitutional:       General: He is not in acute distress  Appearance: Normal appearance  He is ill-appearing  Comments: Thin  HENT:      Head: Normocephalic and atraumatic  Nose: Nose normal    Eyes:      Conjunctiva/sclera: Conjunctivae normal    Cardiovascular:      Rate and Rhythm: Tachycardia present  Pulmonary:      Effort: Pulmonary effort is normal    Abdominal:      General: There is no distension     Musculoskeletal:      Cervical back: Neck supple  Skin:     General: Skin is warm and dry  Coloration: Skin is pale  Neurological:      Mental Status: He is alert  He is disoriented  Psychiatric:         Mood and Affect: Mood normal          Behavior: Behavior is cooperative  Cognition and Memory: Cognition is impaired  Judgment: Judgment is inappropriate  Lab Results:   I have personally reviewed pertinent labs  , CBC:   Lab Results   Component Value Date    WBC 15 59 (H) 11/07/2022    HGB 8 5 (L) 11/07/2022    HCT 25 1 (L) 11/07/2022    MCV 94 11/07/2022     11/07/2022    MCH 32 0 11/07/2022    MCHC 33 9 11/07/2022    RDW 16 4 (H) 11/07/2022    MPV 11 0 11/07/2022    NRBC 0 11/07/2022   , CMP:   Lab Results   Component Value Date    SODIUM 138 11/07/2022    K 3 2 (L) 11/07/2022    CL 98 11/07/2022    CO2 33 (H) 11/07/2022    BUN 13 11/07/2022    CREATININE 0 83 11/07/2022    CALCIUM 11 1 (H) 11/07/2022    AST  11/07/2022      Comment:      No Result; if an AST is required for patient care, it must be ordered separately    Specimen collection should occur prior to Sulfasalazine administration due to the potential for falsely depressed results  ALT 14 11/07/2022    ALKPHOS 87 11/07/2022    EGFR 94 11/07/2022     Imaging Studies: MRI reviewed  EKG, Pathology, and Other Studies: CBC, CMP    Counseling / Coordination of Care    Total floor / unit time spent today 25 minutes  Greater than 50% of total time was spent with the patient and / or family counseling and / or coordination of care   A description of the counseling / coordination of care: chart review, discussed medical updates, discussed symptom management, discussed friend support, provided medical update to friend, Nina Velasco, coordination of care with GALEN Wang PA-C

## 2022-11-07 NOTE — PLAN OF CARE
Problem: Nutrition/Hydration-ADULT  Goal: Nutrient/Hydration intake appropriate for improving, restoring or maintaining nutritional needs  Description: Monitor and assess patient's nutrition/hydration status for malnutrition  Collaborate with interdisciplinary team and initiate plan and interventions as ordered  Monitor patient's weight and dietary intake as ordered or per policy  Utilize nutrition screening tool and intervene as necessary  Determine patient's food preferences and provide high-protein, high-caloric foods as appropriate       INTERVENTIONS:  - Monitor oral intake, urinary output, labs, and treatment plans  - Assess nutrition and hydration status and recommend course of action  - Evaluate amount of meals eaten  - Assist patient with eating if necessary   - Allow adequate time for meals  - Recommend/ encourage appropriate diets, oral nutritional supplements, and vitamin/mineral supplements  - Order, calculate, and assess calorie counts as needed  - Recommend, monitor, and adjust tube feedings and TPN/PPN based on assessed needs  - Assess need for intravenous fluids  - Provide specific nutrition/hydration education as appropriate  - Include patient/family/caregiver in decisions related to nutrition  Outcome: Progressing     Problem: MOBILITY - ADULT  Goal: Maintain or return to baseline ADL function  Description: INTERVENTIONS:  -  Assess patient's ability to carry out ADLs; assess patient's baseline for ADL function and identify physical deficits which impact ability to perform ADLs (bathing, care of mouth/teeth, toileting, grooming, dressing, etc )  - Assess/evaluate cause of self-care deficits   - Assess range of motion  - Assess patient's mobility; develop plan if impaired  - Assess patient's need for assistive devices and provide as appropriate  - Encourage maximum independence but intervene and supervise when necessary  - Involve family in performance of ADLs  - Assess for home care needs following discharge   - Consider OT consult to assist with ADL evaluation and planning for discharge  - Provide patient education as appropriate  Outcome: Progressing  Goal: Maintains/Returns to pre admission functional level  Description: INTERVENTIONS:  - Perform BMAT or MOVE assessment daily    - Set and communicate daily mobility goal to care team and patient/family/caregiver  - Collaborate with rehabilitation services on mobility goals if consulted  - Perform Range of Motion 3 times a day  - Reposition patient every 2 hours    - Dangle patient 3 times a day  - Stand patient 3 times a day  - Ambulate patient 3 times a day  - Out of bed to chair 3 times a day   - Out of bed for meals 3 times a day  - Out of bed for toileting  - Record patient progress and toleration of activity level   Outcome: Progressing     Problem: Prexisting or High Potential for Compromised Skin Integrity  Goal: Skin integrity is maintained or improved  Description: INTERVENTIONS:  - Identify patients at risk for skin breakdown  - Assess and monitor skin integrity  - Assess and monitor nutrition and hydration status  - Monitor labs   - Assess for incontinence   - Turn and reposition patient  - Assist with mobility/ambulation  - Relieve pressure over bony prominences  - Avoid friction and shearing  - Provide appropriate hygiene as needed including keeping skin clean and dry  - Evaluate need for skin moisturizer/barrier cream  - Collaborate with interdisciplinary team   - Patient/family teaching  - Consider wound care consult   Outcome: Progressing     Problem: PAIN - ADULT  Goal: Verbalizes/displays adequate comfort level or baseline comfort level  Description: Interventions:  - Encourage patient to monitor pain and request assistance  - Assess pain using appropriate pain scale  - Administer analgesics based on type and severity of pain and evaluate response  - Implement non-pharmacological measures as appropriate and evaluate response  - Consider cultural and social influences on pain and pain management  - Notify physician/advanced practitioner if interventions unsuccessful or patient reports new pain  Outcome: Progressing     Problem: INFECTION - ADULT  Goal: Absence or prevention of progression during hospitalization  Description: INTERVENTIONS:  - Assess and monitor for signs and symptoms of infection  - Monitor lab/diagnostic results  - Monitor all insertion sites, i e  indwelling lines, tubes, and drains  - Monitor endotracheal if appropriate and nasal secretions for changes in amount and color  - [place] appropriate cooling/warming therapies per order  - Administer medications as ordered  - Instruct and encourage patient and family to use good hand hygiene technique  - Identify and instruct in appropriate isolation precautions for identified infection/condition  Outcome: Progressing  Goal: Absence of fever/infection during neutropenic period  Description: INTERVENTIONS:  - Monitor WBC    Outcome: Progressing     Problem: SAFETY ADULT  Goal: Maintain or return to baseline ADL function  Description: INTERVENTIONS:  -  Assess patient's ability to carry out ADLs; assess patient's baseline for ADL function and identify physical deficits which impact ability to perform ADLs (bathing, care of mouth/teeth, toileting, grooming, dressing, etc )  - Assess/evaluate cause of self-care deficits   - Assess range of motion  - Assess patient's mobility; develop plan if impaired  - Assess patient's need for assistive devices and provide as appropriate  - Encourage maximum independence but intervene and supervise when necessary  - Involve family in performance of ADLs  - Assess for home care needs following discharge   - Consider OT consult to assist with ADL evaluation and planning for discharge  - Provide patient education as appropriate  Outcome: Progressing  Goal: Maintains/Returns to pre admission functional level  Description: INTERVENTIONS:  - Perform BMAT or MOVE assessment daily    - Set and communicate daily mobility goal to care team and patient/family/caregiver  - Collaborate with rehabilitation services on mobility goals if consulted  - Perform Range of Motion 3 times a day  - Reposition patient every 2 hours    - Dangle patient 3 times a day  - Stand patient 3 times a day  - Ambulate patient 3 times a day  - Out of bed to chair 3 times a day   - Out of bed for meals 3 times a day  - Out of bed for toileting  - Record patient progress and toleration of activity level   Outcome: Progressing  Goal: Patient will remain free of falls  Description: INTERVENTIONS:  - Educate patient/family on patient safety including physical limitations  - Instruct patient to call for assistance with activity   - Consult OT/PT to assist with strengthening/mobility   - Keep Call bell within reach  - Keep bed low and locked with side rails adjusted as appropriate  - Keep care items and personal belongings within reach  - Initiate and maintain comfort rounds  - Make Fall Risk Sign visible to staff  - Offer Toileting every 2 Hours, in advance of need  - Initiate/Maintain bed alarm  - Obtain necessary fall risk management equipment:   - Apply yellow socks and bracelet for high fall risk patients  - Consider moving patient to room near nurses station  Outcome: Progressing     Problem: DISCHARGE PLANNING  Goal: Discharge to home or other facility with appropriate resources  Description: INTERVENTIONS:  - Identify barriers to discharge w/patient and caregiver  - Arrange for needed discharge resources and transportation as appropriate  - Identify discharge learning needs (meds, wound care, etc )  - Arrange for interpretive services to assist at discharge as needed  - Refer to Case Management Department for coordinating discharge planning if the patient needs post-hospital services based on physician/advanced practitioner order or complex needs related to functional status, cognitive ability, or social support system  Outcome: Progressing     Problem: Knowledge Deficit  Goal: Patient/family/caregiver demonstrates understanding of disease process, treatment plan, medications, and discharge instructions  Description: Complete learning assessment and assess knowledge base    Interventions:  - Provide teaching at level of understanding  - Provide teaching via preferred learning methods  Outcome: Progressing     Problem: Potential for Falls  Goal: Patient will remain free of falls  Description: INTERVENTIONS:  - Educate patient/family on patient safety including physical limitations  - Instruct patient to call for assistance with activity   - Consult OT/PT to assist with strengthening/mobility   - Keep Call bell within reach  - Keep bed low and locked with side rails adjusted as appropriate  - Keep care items and personal belongings within reach  - Initiate and maintain comfort rounds  - Make Fall Risk Sign visible to staff  - Offer Toileting every 2 Hours, in advance of need  - Initiate/Maintain bed alarm  - Obtain necessary fall risk management equipment:   - Apply yellow socks and bracelet for high fall risk patients  - Consider moving patient to room near nurses station  Outcome: Progressing

## 2022-11-07 NOTE — ASSESSMENT & PLAN NOTE
· Distended gallbladder on CT scan   · Obtain right upper quadrant ultrasound patient also with elevated T bili and alk-phos  · Completed zosyn  No acute surgical needs by general surgery   No suspicion for cholecystitis per surg

## 2022-11-07 NOTE — ASSESSMENT & PLAN NOTE
· Corrected calcium 11 5   · Received 2 L of IV fluids in the ER, will place on continuous fluids overnight and recheck CMP in morning  · Will consult nephrology for assistance, likely hypercalcemia of malignancy

## 2022-11-07 NOTE — ASSESSMENT & PLAN NOTE
· Presents with generalized weakness, fatigue, and weight loss  · CT a/P: " 2 5 x 2 2 x 2 2 cm mass in the left lower lobe concerning for lung cancer  Lytic lesions within the left 6th rib and pelvis bones compatible with osseous metastasis "  · Longstanding smoking history  · Will ask pulm to evaluate  · Heme-Onc recommended tissue diagnosis with complete staging    They recommended patient will need anticoagulation which will be lifelong based on probable stage IV malignancy causing hypercoagulable state  · IR tissue biopsy pending  · Palliative care is on board

## 2022-11-07 NOTE — PROGRESS NOTES
[place]  Progress Note Meldon Less 1960, 58 y o  male MRN: [id number]  Unit/Bed#: -01 Encounter: [id number]  Primary Care Provider: No primary care provider on file  Date and time admitted to hospital: 11/2/2022  9:13 PM    Pedal edema  Assessment & Plan  · B/l pedal edema (R>L) for a couple of days   · Due to probable newly diagnosed metastatic cancer on CT scan, will obtain venous duplex    Hyponatremia  Assessment & Plan  · Sodium at 129 with chloride at 91, sodium corrects to 131 for hyperglycemia   · Suspect hypovolemia as contributory   · Recheck CMP in morning after IV fluids    Elevated glucose  Assessment & Plan  · Serum glucose on admission is 233   · No prior reported history of DM, however patient poor historian  · Will obtain hemoglobin A1c and start on SSI    Hypercalcemia  Assessment & Plan  · Corrected calcium 11 5   · Received 2 L of IV fluids in the ER, will place on continuous fluids overnight and recheck CMP in morning  · If calcium is persistently elevated despite hydration, would consult Nephrology as could be secondary to newly diagnosed malignancy with lytic lesions on CT scan  · Check ionized calcium     Schizophrenia ([facility] [address] )  Assessment & Plan  · Self-reported history of schizophrenia   · Reportedly treated with clonazepam 100 mg HS, however patient reports non adherence with this   · Presented to the ED with acute psychosis requiring Haldol 5 mg IM x1  · Neuro- psychiatry was consulted because of question about patient's capacity  Patient did not seem to have insight into his condition  He does not have capacity to make medical decisions at this time      Abnormal CT scan, gallbladder  Assessment & Plan  · Distended gallbladder on CT scan   · Obtain right upper quadrant ultrasound patient also with elevated T bili and alk-phos  · Continue Zosyn for now    SIRS (systemic inflammatory response syndrome) (HCC)  Assessment & Plan  · Sirs criteria met on admission:  Tachycardia and leukocytosis at 17 36 K without bandemia   · No severe sepsis criteria met   · Unclear if infectious source versus secondary to suspected metastatic lung cancer as seen on CT scan   · UA without infection   · Procalcitonin elevated at 11 67  · ? Elevation secondary to infection or possible small cell lung cancer  · CT scan does show distended gallbladder and with elevated T bili and alk-phos, will treat with IV Zosyn to cover for possible infection  · Blood cultures x2, shows no growth in 24 hours    * Suspected Primary malignant neoplasm of left lung metastatic to other site [facility])  Assessment & Plan  · Presents with generalized weakness, fatigue, and weight loss  · CT a/P: " 2 5 x 2 2 x 2 2 cm mass in the left lower lobe concerning for lung cancer  Lytic lesions within the left 6th rib and pelvis bones compatible with osseous metastasis "  · Longstanding smoking history  · Heme-Onc recommended tissue diagnosis with complete staging  They recommended patient will need anticoagulation which will be lifelong based on probable stage IV malignancy causing hypercoagulable state  · IR tissue biopsy pending  · Palliative care is on board        VTE Pharmacologic Prophylaxis: VTE Score: 4 Moderate Risk (Score 3-4) - Pharmacological DVT Prophylaxis Ordered: rivaroxaban (Xarelto)  Time Spent for Care: 30 minutes  More than 50% of total time spent on counseling and coordination of care as described above  Current Length of Stay: 3 day(s)  Current Patient Status: Inpatient   Certification Statement: The patient will continue to require additional inpatient hospital stay due to Stroke  Discharge Plan: Anticipate discharge in 24-48 hrs to rehab facility      Code Status: Level 1 - Full Code    Subjective:   No fresh complaint    Objective:   Patient looks well    Vitals:   Temp (24hrs), Av 6 °F (37 °C), Min:95 7 °F (35 4 °C), Max:99 6 °F (37 6 °C)    Temp:  [95 7 °F (35 4 °C)-99 6 °F (37 6 °C)] 98 9 °F (37 2 °C)  HR:  [109-119] 118  Resp:  [16-20] 16  BP: (126-135)/(77-98) 135/98  SpO2:  [92 %-97 %] 94 %  Body mass index is 23 63 kg/m²  Input and Output Summary (last 24 hours): Intake/Output Summary (Last 24 hours) at 11/6/2022 2028  Last data filed at 11/6/2022 1606  Gross per 24 hour   Intake 1020 ml   Output 3620 ml   Net -2600 ml       Physical Exam:   Physical Exam  Vitals and nursing note reviewed  Constitutional:       Appearance: He is well-developed  HENT:      Head: Normocephalic and atraumatic  Eyes:      Conjunctiva/sclera: Conjunctivae normal    Cardiovascular:      Rate and Rhythm: Normal rate and regular rhythm  Heart sounds: No murmur heard  Pulmonary:      Effort: Pulmonary effort is normal  No respiratory distress  Breath sounds: Normal breath sounds  Abdominal:      Palpations: Abdomen is soft  Tenderness: There is no abdominal tenderness  Musculoskeletal:      Cervical back: Neck supple  Skin:     General: Skin is warm and dry  Neurological:      Mental Status: He is alert              Additional Data:     Labs:  Results from last 7 days   Lab Units 11/06/22  0424   WBC Thousand/uL 14 39*   HEMOGLOBIN g/dL 8 1*   HEMATOCRIT % 24 0*   PLATELETS Thousands/uL 196   NEUTROS PCT % 74   LYMPHS PCT % 15   MONOS PCT % 9   EOS PCT % 1     Results from last 7 days   Lab Units 11/06/22  0424   SODIUM mmol/L 136   POTASSIUM mmol/L 3 1*   CHLORIDE mmol/L 99   CO2 mmol/L 35*   BUN mg/dL 10   CREATININE mg/dL 0 70   ANION GAP mmol/L 2*   CALCIUM mg/dL 9 7   ALBUMIN g/dL 2 0*   TOTAL BILIRUBIN mg/dL 0 70   ALK PHOS U/L 84   ALT U/L 14   AST U/L 12   GLUCOSE RANDOM mg/dL 167*     Results from last 7 days   Lab Units 11/03/22  1234   INR  1 08     Results from last 7 days   Lab Units 11/06/22  1605 11/06/22  1123 11/06/22  0746 11/05/22  2110 11/05/22  1611 11/05/22  1129 11/05/22  0757 11/04/22  2044 11/04/22  1653 11/04/22  1110 11/04/22  1791 11/04/22  0555   POC GLUCOSE mg/dl 187* 199* 206* 199* 196* 219* 213* 290* 232* 257* 198* 205*     Results from last 7 days   Lab Units 11/02/22  2349   HEMOGLOBIN A1C % 9 1*     Results from last 7 days   Lab Units 11/04/22  0254 11/03/22  0442 11/02/22  2349   LACTIC ACID mmol/L  --   --  1 1   PROCALCITONIN ng/ml 3 26* 10 74* 11 67*       Lines/Drains:  Invasive Devices  Report    Peripheral Intravenous Line  Duration           Peripheral IV 11/02/22 Right Antecubital 3 days    Peripheral IV 11/03/22 Dorsal (posterior); Left Forearm 3 days                  Telemetry:  Telemetry Orders (From admission, onward)             48 Hour Telemetry Monitoring  Continuous x 48 hours        References:    Telemetry Guidelines   Question:  Reason for 48 Hour Telemetry  Answer:  Acute Decompensated CHF (continuous diuretic infusion or total diuretic dose > 200 mg daily, associated electrolyte derangement, ionotropic drip, history of ventricular arrhythmia, or new EF <35%)                          Recent Cultures (last 7 days):   Results from last 7 days   Lab Units 11/02/22  2349   BLOOD CULTURE  No Growth at 72 hrs  No Growth at 72 hrs         Last 24 Hours Medication List:   Current Facility-Administered Medications   Medication Dose Route Frequency Provider Last Rate   • acetaminophen  650 mg Oral Q8H PRN Isabel Chen PA-C     • heparin (porcine)  3-30 Units/kg/hr (Order-Specific) Intravenous Titrated Aaron Hennessy MD 20 Units/kg/hr (11/06/22 1922)   • influenza vaccine  0 5 mL Intramuscular Prior to discharge Isabel Chen PA-C     • insulin glargine  8 Units Subcutaneous HS Aaron Hennessy MD     • insulin lispro  1-5 Units Subcutaneous TID AC Isabel Chen PA-C     • insulin lispro  1-5 Units Subcutaneous HS Isabel Chen PA-C     • insulin lispro  3 Units Subcutaneous TID With Meals Isabel Chen PA-C     • lidocaine  1 patch Topical Daily [facility] Saba Bhakta MD     • lidocaine  1 patch Topical Daily Tammi Rios MD     • nicotine  1 patch Transdermal Daily Jesus Junior PA-C     • OLANZapine  5 mg Oral HS Tammi Rios MD     • ondansetron  4 mg Intravenous Q6H PRN Jesus Junior PA-C     • oxyCODONE  5 mg Oral Q6H PRN Tammi Rios MD     • piperacillin-tazobactam  3 375 g Intravenous Q6H Jesus Junior PA-C 3 375 g (11/06/22 0902)   • senna  1 tablet Oral HS PRN Jesus Junior PA-C          Today, Patient Was Seen By: Tammi Rios    **Please Note: This note may have been constructed using a voice recognition system  **

## 2022-11-07 NOTE — PLAN OF CARE
Problem: Nutrition/Hydration-ADULT  Goal: Nutrient/Hydration intake appropriate for improving, restoring or maintaining nutritional needs  Description: Monitor and assess patient's nutrition/hydration status for malnutrition  Collaborate with interdisciplinary team and initiate plan and interventions as ordered  Monitor patient's weight and dietary intake as ordered or per policy  Utilize nutrition screening tool and intervene as necessary  Determine patient's food preferences and provide high-protein, high-caloric foods as appropriate       INTERVENTIONS:  - Monitor oral intake, urinary output, labs, and treatment plans  - Assess nutrition and hydration status and recommend course of action  - Evaluate amount of meals eaten  - Assist patient with eating if necessary   - Allow adequate time for meals  - Recommend/ encourage appropriate diets, oral nutritional supplements, and vitamin/mineral supplements  - Order, calculate, and assess calorie counts as needed  - Recommend, monitor, and adjust tube feedings and TPN/PPN based on assessed needs  - Assess need for intravenous fluids  - Provide specific nutrition/hydration education as appropriate  - Include patient/family/caregiver in decisions related to nutrition  Outcome: Progressing     Problem: MOBILITY - ADULT  Goal: Maintain or return to baseline ADL function  Description: INTERVENTIONS:  -  Assess patient's ability to carry out ADLs; assess patient's baseline for ADL function and identify physical deficits which impact ability to perform ADLs (bathing, care of mouth/teeth, toileting, grooming, dressing, etc )  - Assess/evaluate cause of self-care deficits   - Assess range of motion  - Assess patient's mobility; develop plan if impaired  - Assess patient's need for assistive devices and provide as appropriate  - Encourage maximum independence but intervene and supervise when necessary  - Involve family in performance of ADLs  - Assess for home care needs following discharge   - Consider OT consult to assist with ADL evaluation and planning for discharge  - Provide patient education as appropriate  Outcome: Progressing  Goal: Maintains/Returns to pre admission functional level  Description: INTERVENTIONS:  - Perform BMAT or MOVE assessment daily    - Set and communicate daily mobility goal to care team and patient/family/caregiver  - Collaborate with rehabilitation services on mobility goals if consulted  - Perform Range of Motion 3 times a day  - Reposition patient every 2 hours    - Dangle patient 3 times a day  - Stand patient 3 times a day  - Ambulate patient 3 times a day  - Out of bed to chair 3 times a day   - Out of bed for meals 3 times a day  - Out of bed for toileting  - Record patient progress and toleration of activity level   Outcome: Progressing     Problem: Prexisting or High Potential for Compromised Skin Integrity  Goal: Skin integrity is maintained or improved  Description: INTERVENTIONS:  - Identify patients at risk for skin breakdown  - Assess and monitor skin integrity  - Assess and monitor nutrition and hydration status  - Monitor labs   - Assess for incontinence   - Turn and reposition patient  - Assist with mobility/ambulation  - Relieve pressure over bony prominences  - Avoid friction and shearing  - Provide appropriate hygiene as needed including keeping skin clean and dry  - Evaluate need for skin moisturizer/barrier cream  - Collaborate with interdisciplinary team   - Patient/family teaching  - Consider wound care consult   Outcome: Progressing     Problem: PAIN - ADULT  Goal: Verbalizes/displays adequate comfort level or baseline comfort level  Description: Interventions:  - Encourage patient to monitor pain and request assistance  - Assess pain using appropriate pain scale  - Administer analgesics based on type and severity of pain and evaluate response  - Implement non-pharmacological measures as appropriate and evaluate response  - Consider cultural and social influences on pain and pain management  - Notify physician/advanced practitioner if interventions unsuccessful or patient reports new pain  Outcome: Progressing     Problem: INFECTION - ADULT  Goal: Absence or prevention of progression during hospitalization  Description: INTERVENTIONS:  - Assess and monitor for signs and symptoms of infection  - Monitor lab/diagnostic results  - Monitor all insertion sites, i e  indwelling lines, tubes, and drains  - Monitor endotracheal if appropriate and nasal secretions for changes in amount and color  - [place] appropriate cooling/warming therapies per order  - Administer medications as ordered  - Instruct and encourage patient and family to use good hand hygiene technique  - Identify and instruct in appropriate isolation precautions for identified infection/condition  Outcome: Progressing  Goal: Absence of fever/infection during neutropenic period  Description: INTERVENTIONS:  - Monitor WBC    Outcome: Progressing     Problem: SAFETY ADULT  Goal: Maintain or return to baseline ADL function  Description: INTERVENTIONS:  -  Assess patient's ability to carry out ADLs; assess patient's baseline for ADL function and identify physical deficits which impact ability to perform ADLs (bathing, care of mouth/teeth, toileting, grooming, dressing, etc )  - Assess/evaluate cause of self-care deficits   - Assess range of motion  - Assess patient's mobility; develop plan if impaired  - Assess patient's need for assistive devices and provide as appropriate  - Encourage maximum independence but intervene and supervise when necessary  - Involve family in performance of ADLs  - Assess for home care needs following discharge   - Consider OT consult to assist with ADL evaluation and planning for discharge  - Provide patient education as appropriate  Outcome: Progressing  Goal: Maintains/Returns to pre admission functional level  Description: INTERVENTIONS:  - Perform BMAT or MOVE assessment daily    - Set and communicate daily mobility goal to care team and patient/family/caregiver  - Collaborate with rehabilitation services on mobility goals if consulted  - Perform Range of Motion 3 times a day  - Reposition patient every 2 hours    - Dangle patient 3 times a day  - Stand patient 3 times a day  - Ambulate patient 3 times a day  - Out of bed to chair 3 times a day   - Out of bed for meals 3 times a day  - Out of bed for toileting  - Record patient progress and toleration of activity level   Outcome: Progressing  Goal: Patient will remain free of falls  Description: INTERVENTIONS:  - Educate patient/family on patient safety including physical limitations  - Instruct patient to call for assistance with activity   - Consult OT/PT to assist with strengthening/mobility   - Keep Call bell within reach  - Keep bed low and locked with side rails adjusted as appropriate  - Keep care items and personal belongings within reach  - Initiate and maintain comfort rounds  - Make Fall Risk Sign visible to staff  - Offer Toileting every 2 Hours, in advance of need  - Initiate/Maintain alarm  - Obtain necessary fall risk management equipment:   - Apply yellow socks and bracelet for high fall risk patients  - Consider moving patient to room near nurses station  Outcome: Progressing     Problem: DISCHARGE PLANNING  Goal: Discharge to home or other facility with appropriate resources  Description: INTERVENTIONS:  - Identify barriers to discharge w/patient and caregiver  - Arrange for needed discharge resources and transportation as appropriate  - Identify discharge learning needs (meds, wound care, etc )  - Arrange for interpretive services to assist at discharge as needed  - Refer to Case Management Department for coordinating discharge planning if the patient needs post-hospital services based on physician/advanced practitioner order or complex needs related to functional status, cognitive ability, or social support system  Outcome: Progressing     Problem: Knowledge Deficit  Goal: Patient/family/caregiver demonstrates understanding of disease process, treatment plan, medications, and discharge instructions  Description: Complete learning assessment and assess knowledge base    Interventions:  - Provide teaching at level of understanding  - Provide teaching via preferred learning methods  Outcome: Progressing     Problem: Potential for Falls  Goal: Patient will remain free of falls  Description: INTERVENTIONS:  - Educate patient/family on patient safety including physical limitations  - Instruct patient to call for assistance with activity   - Consult OT/PT to assist with strengthening/mobility   - Keep Call bell within reach  - Keep bed low and locked with side rails adjusted as appropriate  - Keep care items and personal belongings within reach  - Initiate and maintain comfort rounds  - Make Fall Risk Sign visible to staff  - Offer Toileting every 2 Hours, in advance of need  - Initiate/Maintain alarm  - Obtain necessary fall risk management equipment:   - Apply yellow socks and bracelet for high fall risk patients  - Consider moving patient to room near nurses station  Outcome: Progressing

## 2022-11-07 NOTE — CONSULTS
Consultation - Pulmonary Medicine   Carla Castellano 58 y o  male MRN: [id number]  Unit/Bed#: -01 Encounter: [id number]      Assessment & Plan:   Acute respiratory insufficiency  Abnormal CT chest suspicious for primary lung cancer with metastatic disease  Acute PE and DVT  Schizoaffective disorder  Tobacco abuse    · Patient reportedly desaturating to 88% while supine, likely secondary to pleural effusion  · Scheduled for IR biopsy of bone tomorrow  Will request add on for thoracentesis as patient is symptomatic  · Will need Oncology and palliative Care follow-up  Course will likely be complicated by patient's lack of competency to make his own medical decisions  · Anticoagulation per Hematology  Patient likely remain on anticoagulation for the foreseeable future  · Pulmonary to follow PRN/please call with questions  · Discussed with nursing     History of Present Illness   Physician Requesting Consult: Hany Rodriguez MD  Reason for Consult / Principal Problem: metastatic cancer  Hx and PE limited by: mental status   HPI: Carla Castellano is a 58y o  year old male who presents with generalized weakness and fatigue  CT imaging revealed evidence of likely lung cancer with metastatic disease to the bone along with PE  Patient has not particularly been going of shortness of breath  He has been noted to have desaturation episodes as noted above  He says that he is feeling better since he has been admitted  Still feeling weak and still has leg pain  Patient does not typically see any doctors  He does not carry diagnosis of chronic pulmonary disease  He is a smoker of 1 PPD since the age of 12  He is an [employer]   Consults    Review of Systems   Constitutional: Positive for fatigue  Respiratory: Negative for shortness of breath  Cardiovascular: Positive for leg swelling  All other systems reviewed and are negative        Historical Information   Past Medical History:   Diagnosis Date   • Bladder infection      History reviewed  No pertinent surgical history  Social History   Social History     Substance and Sexual Activity   Alcohol Use Not Currently     Social History     Substance and Sexual Activity   Drug Use Never     E-Cigarette/Vaping   • E-Cigarette Use Never User      E-Cigarette/Vaping Substances     Social History     Tobacco Use   Smoking Status Current Every Day Smoker   • Packs/day: 1 00   • Types: Cigarettes   Smokeless Tobacco Never Used     Occupational History: army     Family History: History reviewed  No pertinent family history  Meds/Allergies   all current active meds have been reviewed    No Known Allergies    Objective   Vitals: Blood pressure 137/99, pulse (!) 124, temperature 98 9 °F (37 2 °C), resp  rate 16, height 5' 9" (1 753 m), weight 72 6 kg (160 lb), SpO2 92 %  ,Body mass index is 23 63 kg/m²  Intake/Output Summary (Last 24 hours) at 11/7/2022 1200  Last data filed at 11/7/2022 1100  Gross per 24 hour   Intake 420 ml   Output 1990 ml   Net -1570 ml     Invasive Devices  Report    Peripheral Intravenous Line  Duration           Peripheral IV 11/07/22 Left Antecubital <1 day    Peripheral IV 11/07/22 Right;Ventral (anterior) Forearm <1 day                Physical Exam  Vitals reviewed  Constitutional:       General: He is not in acute distress  Appearance: He is not toxic-appearing  HENT:      Head: Normocephalic and atraumatic  Eyes:      General: No scleral icterus  Cardiovascular:      Rate and Rhythm: Normal rate and regular rhythm  Pulmonary:      Effort: Pulmonary effort is normal       Breath sounds: Normal breath sounds  Musculoskeletal:         General: Tenderness present  Right lower leg: No edema  Left lower leg: No edema  Skin:     General: Skin is warm and dry  Neurological:      General: No focal deficit present  Mental Status: He is alert  Psychiatric:         Behavior: Behavior is cooperative           Lab Results: I have personally reviewed pertinent lab results  Imaging Studies: I have personally reviewed pertinent reports  and I have personally reviewed pertinent films in PACS  EKG, Pathology, and Other Studies: I have personally reviewed pertinent reports      VTE Prophylaxis: Heparin    Code Status: Level 1 - Full Code  Advance Directive and Living Will:      Power of :    POLST:

## 2022-11-08 ENCOUNTER — APPOINTMENT (INPATIENT)
Dept: INTERVENTIONAL RADIOLOGY/VASCULAR | Facility: HOSPITAL | Age: 62
End: 2022-11-08

## 2022-11-08 ENCOUNTER — APPOINTMENT (INPATIENT)
Dept: CT IMAGING | Facility: HOSPITAL | Age: 62
End: 2022-11-08
Attending: RADIOLOGY

## 2022-11-08 LAB
ALBUMIN SERPL BCP-MCNC: 2.1 G/DL (ref 3.5–5)
ALP SERPL-CCNC: 89 U/L (ref 46–116)
ALT SERPL W P-5'-P-CCNC: 12 U/L (ref 12–78)
ANION GAP SERPL CALCULATED.3IONS-SCNC: 3 MMOL/L (ref 4–13)
APTT PPP: 73 SECONDS (ref 23–37)
AST SERPL W P-5'-P-CCNC: 11 U/L (ref 5–45)
BACTERIA BLD CULT: NORMAL
BACTERIA BLD CULT: NORMAL
BASOPHILS # BLD AUTO: 0.02 THOUSANDS/ÂΜL (ref 0–0.1)
BASOPHILS NFR BLD AUTO: 0 % (ref 0–1)
BILIRUB SERPL-MCNC: 0.7 MG/DL (ref 0.2–1)
BUN SERPL-MCNC: 21 MG/DL (ref 5–25)
CALCIUM ALBUM COR SERPL-MCNC: 13.1 MG/DL (ref 8.3–10.1)
CALCIUM SERPL-MCNC: 11.6 MG/DL (ref 8.3–10.1)
CHLORIDE SERPL-SCNC: 100 MMOL/L (ref 96–108)
CO2 SERPL-SCNC: 35 MMOL/L (ref 21–32)
CREAT SERPL-MCNC: 1.17 MG/DL (ref 0.6–1.3)
EOSINOPHIL # BLD AUTO: 0.1 THOUSAND/ÂΜL (ref 0–0.61)
EOSINOPHIL NFR BLD AUTO: 1 % (ref 0–6)
ERYTHROCYTE [DISTWIDTH] IN BLOOD BY AUTOMATED COUNT: 16.7 % (ref 11.6–15.1)
GFR SERPL CREATININE-BSD FRML MDRD: 66 ML/MIN/1.73SQ M
GLUCOSE SERPL-MCNC: 158 MG/DL (ref 65–140)
GLUCOSE SERPL-MCNC: 170 MG/DL (ref 65–140)
GLUCOSE SERPL-MCNC: 179 MG/DL (ref 65–140)
GLUCOSE SERPL-MCNC: 179 MG/DL (ref 65–140)
GLUCOSE SERPL-MCNC: 253 MG/DL (ref 65–140)
HCT VFR BLD AUTO: 24.4 % (ref 36.5–49.3)
HGB BLD-MCNC: 8 G/DL (ref 12–17)
IMM GRANULOCYTES # BLD AUTO: 0.12 THOUSAND/UL (ref 0–0.2)
IMM GRANULOCYTES NFR BLD AUTO: 1 % (ref 0–2)
LYMPHOCYTES # BLD AUTO: 1.35 THOUSANDS/ÂΜL (ref 0.6–4.47)
LYMPHOCYTES NFR BLD AUTO: 9 % (ref 14–44)
MCH RBC QN AUTO: 30.9 PG (ref 26.8–34.3)
MCHC RBC AUTO-ENTMCNC: 32.8 G/DL (ref 31.4–37.4)
MCV RBC AUTO: 94 FL (ref 82–98)
MONOCYTES # BLD AUTO: 0.98 THOUSAND/ÂΜL (ref 0.17–1.22)
MONOCYTES NFR BLD AUTO: 7 % (ref 4–12)
NEUTROPHILS # BLD AUTO: 12.06 THOUSANDS/ÂΜL (ref 1.85–7.62)
NEUTS SEG NFR BLD AUTO: 82 % (ref 43–75)
NRBC BLD AUTO-RTO: 0 /100 WBCS
PLATELET # BLD AUTO: 184 THOUSANDS/UL (ref 149–390)
PMV BLD AUTO: 9.8 FL (ref 8.9–12.7)
POTASSIUM SERPL-SCNC: 3.6 MMOL/L (ref 3.5–5.3)
PROT SERPL-MCNC: 5.9 G/DL (ref 6.4–8.4)
RBC # BLD AUTO: 2.59 MILLION/UL (ref 3.88–5.62)
SODIUM SERPL-SCNC: 138 MMOL/L (ref 135–147)
WBC # BLD AUTO: 14.63 THOUSAND/UL (ref 4.31–10.16)

## 2022-11-08 PROCEDURE — 0QB33ZX EXCISION OF LEFT PELVIC BONE, PERCUTANEOUS APPROACH, DIAGNOSTIC: ICD-10-PCS | Performed by: RADIOLOGY

## 2022-11-08 RX ORDER — MIDAZOLAM HYDROCHLORIDE 2 MG/2ML
INJECTION, SOLUTION INTRAMUSCULAR; INTRAVENOUS CODE/TRAUMA/SEDATION MEDICATION
Status: COMPLETED | OUTPATIENT
Start: 2022-11-08 | End: 2022-11-08

## 2022-11-08 RX ORDER — LIDOCAINE HYDROCHLORIDE 10 MG/ML
INJECTION, SOLUTION EPIDURAL; INFILTRATION; INTRACAUDAL; PERINEURAL CODE/TRAUMA/SEDATION MEDICATION
Status: COMPLETED | OUTPATIENT
Start: 2022-11-08 | End: 2022-11-08

## 2022-11-08 RX ORDER — FUROSEMIDE 10 MG/ML
20 INJECTION INTRAMUSCULAR; INTRAVENOUS ONCE
Status: COMPLETED | OUTPATIENT
Start: 2022-11-08 | End: 2022-11-08

## 2022-11-08 RX ORDER — OXYCODONE HYDROCHLORIDE 5 MG/1
5 TABLET ORAL EVERY 6 HOURS PRN
Status: DISCONTINUED | OUTPATIENT
Start: 2022-11-08 | End: 2022-11-09

## 2022-11-08 RX ORDER — FENTANYL CITRATE 50 UG/ML
INJECTION, SOLUTION INTRAMUSCULAR; INTRAVENOUS CODE/TRAUMA/SEDATION MEDICATION
Status: COMPLETED | OUTPATIENT
Start: 2022-11-08 | End: 2022-11-08

## 2022-11-08 RX ADMIN — FUROSEMIDE 20 MG: 10 INJECTION, SOLUTION INTRAMUSCULAR; INTRAVENOUS at 18:00

## 2022-11-08 RX ADMIN — OXYCODONE HYDROCHLORIDE 5 MG: 5 TABLET ORAL at 18:14

## 2022-11-08 RX ADMIN — OXYCODONE HYDROCHLORIDE 5 MG: 5 TABLET ORAL at 02:24

## 2022-11-08 RX ADMIN — PIPERACILLIN AND TAZOBACTAM 3.38 G: 3; .375 INJECTION, POWDER, LYOPHILIZED, FOR SOLUTION INTRAVENOUS at 23:50

## 2022-11-08 RX ADMIN — LIDOCAINE 5% 1 PATCH: 700 PATCH TOPICAL at 08:23

## 2022-11-08 RX ADMIN — PIPERACILLIN AND TAZOBACTAM 3.38 G: 3; .375 INJECTION, POWDER, LYOPHILIZED, FOR SOLUTION INTRAVENOUS at 08:28

## 2022-11-08 RX ADMIN — OLANZAPINE 5 MG: 5 TABLET, FILM COATED ORAL at 23:01

## 2022-11-08 RX ADMIN — ACETAMINOPHEN 650 MG: 325 TABLET, FILM COATED ORAL at 20:40

## 2022-11-08 RX ADMIN — INSULIN GLARGINE 8 UNITS: 100 INJECTION, SOLUTION SUBCUTANEOUS at 23:01

## 2022-11-08 RX ADMIN — MIDAZOLAM 2 MG: 1 INJECTION INTRAMUSCULAR; INTRAVENOUS at 14:31

## 2022-11-08 RX ADMIN — LIDOCAINE HYDROCHLORIDE 8 ML: 10 INJECTION, SOLUTION EPIDURAL; INFILTRATION; INTRACAUDAL; PERINEURAL at 14:31

## 2022-11-08 RX ADMIN — NICOTINE 1 PATCH: 21 PATCH, EXTENDED RELEASE TRANSDERMAL at 08:24

## 2022-11-08 RX ADMIN — SODIUM CHLORIDE 80 ML/HR: 0.9 INJECTION, SOLUTION INTRAVENOUS at 04:01

## 2022-11-08 RX ADMIN — INSULIN LISPRO 1 UNITS: 100 INJECTION, SOLUTION INTRAVENOUS; SUBCUTANEOUS at 23:00

## 2022-11-08 RX ADMIN — ACETAMINOPHEN 650 MG: 325 TABLET, FILM COATED ORAL at 06:18

## 2022-11-08 RX ADMIN — PIPERACILLIN AND TAZOBACTAM 3.38 G: 3; .375 INJECTION, POWDER, LYOPHILIZED, FOR SOLUTION INTRAVENOUS at 18:08

## 2022-11-08 RX ADMIN — FENTANYL CITRATE 100 MCG: 50 INJECTION, SOLUTION INTRAMUSCULAR; INTRAVENOUS at 14:31

## 2022-11-08 RX ADMIN — HEPARIN SODIUM 20 UNITS/KG/HR: 10000 INJECTION, SOLUTION INTRAVENOUS at 18:02

## 2022-11-08 RX ADMIN — OXYCODONE HYDROCHLORIDE 5 MG: 5 TABLET ORAL at 10:36

## 2022-11-08 RX ADMIN — INSULIN LISPRO 2 UNITS: 100 INJECTION, SOLUTION INTRAVENOUS; SUBCUTANEOUS at 18:05

## 2022-11-08 RX ADMIN — INSULIN LISPRO 3 UNITS: 100 INJECTION, SOLUTION INTRAVENOUS; SUBCUTANEOUS at 18:06

## 2022-11-08 RX ADMIN — PIPERACILLIN AND TAZOBACTAM 3.38 G: 3; .375 INJECTION, POWDER, LYOPHILIZED, FOR SOLUTION INTRAVENOUS at 02:23

## 2022-11-08 NOTE — PROGRESS NOTES
NEPHROLOGY PROGRESS NOTE   Quinn Tee 58 y o  male MRN: [id number]  Unit/Bed#: -01 Encounter: [id number]      HPI/24hr EVENTS:    -58-year-old male past medical history of schizophrenia, presented with generalized weakness/fatigue/ambulatory dysfunction, unintentional weight loss of 25 lb over 2 months  Found to have pulmonary embolism, DVT, new lung mass concerning for malignancy with possible metastases    Nephrology was consulted for management of hypercalcemia    -received zometa 4mg IV, IV fluid Lasix yesterday, pending biopsy with IR today/NPO    ASSESSMENT/PLAN:    Hypercalcemia  -corrected calcium on admission was 11 5, received IV fluid with downtrending to a yojana of 10 3 on 11/03, now increasing to 13 1 on most recent corrected calcium  -PTH 9 8, vitamin-D 25-hydroxy 20 7  -MRI lumbar spine with scattered enhancing osseous metastases throughout the lumbar spine  -CTA chest PE study with metastatic disease of bilateral ribs, 3 6 cm thick-walled cavitary right upper lobe tumor, 2 8 cm necrotic left lobe tumor  -suspect hypercalcemia related to underlying malignancy, patient does report he takes oral calcium and vitamin-D supplements at home, hold further vitamin-D and calcium supplements while inpatient  -status post Zometa 4 mg IV on 11/07  -received additional IV fluid bolus yesterday then started on 0 9% saline 80ml/hr     Lung mass  -CTA as noted above  -has pending IR biopsy tentative 11/8  -palliative/Hematology/Oncology/pulmonology consulted     Blood pressure  -Recent blood pressures trends are acceptable   -not on any antihypertensives currently    Acute hypoxic respiratory failure  -prior requiring 2 L nasal cannula, currently on room air  -pulmonology consulted  -pending thoracentesis for left pleural effusion    Anemia  -Hgb 8 0  -Recommend transfuse for goal greater than 7 per primary team  -hematology/oncology consult  -iron panel from 11/03:  Ferritin greater than 1000, iron sat 37     Lower extremity edema  -has bilateral lower extremity DVT on Doppler  -currently on heparin infusion     Pulmonary embolism  -CTA PE study with few small subsegmental emboli in the right middle lobe right lower lobe and inferior lingula  -currently on heparin infusion per primary team     Elevated serum bicarbonate  -suspect 2nd to volume contraction  -downtrending after IV fluid     SIRS  -meeting SIRS criteria on admission unclear source  -currently on Zosyn     Advanced care planning  -evaluated by neuropsych, deemed to not have capacity to make fully informed medical decisions     Additional Medical Problems:  Schizophrenia    SUBJECTIVE:  Reports intermittent lower back pain overnight, no additional complaints    ROS:  Review of Systems   Constitutional: Negative  HENT: Negative  Respiratory: Negative  Cardiovascular: Negative  Gastrointestinal: Negative  Musculoskeletal: Positive for back pain  A complete 10 point review of systems was performed and found to be negative unless otherwise noted above or in the HPI  OBJECTIVE:  Current Weight: Weight - Scale: 72 6 kg (160 lb)  Vitals:    11/07/22 1353 11/07/22 1551 11/08/22 0749 11/08/22 0832   BP:  110/71 107/67    Pulse:  (!) 108     Resp:  21     Temp: 99 2 °F (37 3 °C) 97 7 °F (36 5 °C) 98 1 °F (36 7 °C)    TempSrc: Axillary      SpO2:  94%  93%   Weight:       Height:           Intake/Output Summary (Last 24 hours) at 11/8/2022 0955  Last data filed at 11/8/2022 0401  Gross per 24 hour   Intake 420 ml   Output 1100 ml   Net -680 ml     Physical Exam  Vitals and nursing note reviewed  Constitutional:       General: He is not in acute distress  Appearance: Normal appearance  He is ill-appearing (Chronically)  He is not toxic-appearing or diaphoretic  HENT:      Head: Normocephalic and atraumatic  Nose: Nose normal       Mouth/Throat:      Mouth: Mucous membranes are dry     Eyes:      General: No scleral icterus  Cardiovascular:      Rate and Rhythm: Regular rhythm  Tachycardia present  Pulses: Normal pulses  Pulmonary:      Effort: Pulmonary effort is normal  No respiratory distress  Breath sounds: Normal breath sounds  No stridor  No wheezing or rales  Abdominal:      General: Abdomen is flat  There is no distension  Palpations: Abdomen is soft  Musculoskeletal:      Cervical back: Neck supple  Right lower leg: No edema  Left lower leg: No edema  Skin:     General: Skin is warm and dry  Capillary Refill: Capillary refill takes less than 2 seconds  Neurological:      General: No focal deficit present  Mental Status: He is alert     Psychiatric:         Mood and Affect: Mood normal           Medications:    Current Facility-Administered Medications:   •  acetaminophen (TYLENOL) tablet 650 mg, 650 mg, Oral, Q8H PRN, Jennifer Jimenez PA-C, 650 mg at 11/08/22 2658  •  heparin (porcine) 25,000 units in 0 45% NaCl 250 mL infusion (premix), 3-30 Units/kg/hr (Order-Specific), Intravenous, Titrated, Rubi Stuart MD, Last Rate: 14 mL/hr at 11/07/22 2223, 20 Units/kg/hr at 11/07/22 2223  •  influenza vaccine, recombinant, quadrivalent (FLUBLOK) IM injection 0 5 mL, 0 5 mL, Intramuscular, Prior to discharge, Jennifer Jimenez PA-C  •  insulin glargine (LANTUS) subcutaneous injection 8 Units 0 08 mL, 8 Units, Subcutaneous, HS, Haleigh Robins MD, 8 Units at 11/07/22 2230  •  insulin lispro (HumaLOG) 100 units/mL subcutaneous injection 1-5 Units, 1-5 Units, Subcutaneous, TID AC, 1 Units at 11/07/22 1652 **AND** Fingerstick Glucose (POCT), , , TID AC, Jennifer Jimenez PA-C  •  insulin lispro (HumaLOG) 100 units/mL subcutaneous injection 1-5 Units, 1-5 Units, Subcutaneous, HS, Jennifer Jimenez PA-C, 1 Units at 11/07/22 2231  •  insulin lispro (HumaLOG) 100 units/mL subcutaneous injection 3 Units, 3 Units, Subcutaneous, TID With Meals, Mike Araujo Nik Tracy PA-C, 3 Units at 11/07/22 1653  •  lidocaine (LIDODERM) 5 % patch 1 patch, 1 patch, Topical, Daily, Miriam Brink MD, 1 patch at 11/08/22 6730  •  lidocaine (LIDODERM) 5 % patch 1 patch, 1 patch, Topical, Daily, Miriam Brink MD, 1 patch at 11/08/22 9416  •  nicotine (NICODERM CQ) 21 mg/24 hr TD 24 hr patch 1 patch, 1 patch, Transdermal, Daily, Henok Jj PA-C, 1 patch at 11/08/22 6568  •  OLANZapine (ZyPREXA) tablet 5 mg, 5 mg, Oral, HS, Haleigh Smith MD, 5 mg at 11/07/22 2231  •  ondansetron (ZOFRAN) injection 4 mg, 4 mg, Intravenous, Q6H PRN, Henok Jj PA-C  •  oxyCODONE (ROXICODONE) IR tablet 5 mg, 5 mg, Oral, Q6H PRN, Georges Junior PA-C, 5 mg at 11/08/22 0224  •  piperacillin-tazobactam (ZOSYN) IVPB 3 375 g, 3 375 g, Intravenous, Q6H, Selene Ortiz MD, Last Rate: 200 mL/hr at 11/07/22 1358, 3 375 g at 11/08/22 0825  •  senna (SENOKOT) tablet 8 6 mg, 1 tablet, Oral, HS PRN, Henok Jj PA-C  •  sodium chloride 0 9 % infusion, 80 mL/hr, Intravenous, Continuous, Butch Weiner MD, Last Rate: 80 mL/hr at 11/08/22 0401, 80 mL/hr at 11/08/22 0401    Laboratory Results:  Results from last 7 days   Lab Units 11/08/22  0522 11/07/22  0602 11/06/22  0424 11/05/22  1305 11/04/22  0254 11/03/22  1234 11/03/22  0442 11/02/22  2349   WBC Thousand/uL 14 63* 15 59* 14 39* 13 54* 17 51* 20 09* 15 50* 17 36*   HEMOGLOBIN g/dL 8 0* 8 5* 8 1* 8 9* 8 9* 9 5* 9 2* 12 1   HEMATOCRIT % 24 4* 25 1* 24 0* 26 3* 26 9* 28 0* 27 1* 35 3*   PLATELETS Thousands/uL 184 214 196 213 195 201 180 258   POTASSIUM mmol/L 3 6 3 2* 3 1* 3 1* 3 7  --  3 2* 3 7   CHLORIDE mmol/L 100 98 99 96 99  --  97 91*   CO2 mmol/L 35* 33* 35* 36* 37*  --  32 32   BUN mg/dL 21 13 10 11 14  --  19 23   CREATININE mg/dL 1 17 0 83 0 70 0 75 0 67  --  0 71 0 78   CALCIUM mg/dL 11 6* 11 1* 9 7 9 5 9 0  --  8 8 10 6*   MAGNESIUM mg/dL  --  1 5*  --  1 6 1 8  --  1 5*  --    PHOSPHORUS mg/dL  --   --   --   --  3 2  --  3 3  --        I have personally reviewed the blood work as stated above and in my note  I have personally reviewed internal Medicine, palliative Care, pulmonology, Oncology note

## 2022-11-08 NOTE — ASSESSMENT & PLAN NOTE
· B/l pedal edema (R>L) for a couple of days   · Due to probable newly diagnosed metastatic cancer on CT scan, which demonstrated DVTs

## 2022-11-08 NOTE — BRIEF OP NOTE (RAD/CATH)
INTERVENTIONAL RADIOLOGY PROCEDURE NOTE    Date: 11/8/2022    Procedure:  Bone lesion biopsy  Preoperative diagnosis:   1  Metastatic cancer ([facility] )    2  Generalized weakness    3  Leukocytosis    4  Suspected Primary malignant neoplasm of left lung metastatic to other site ([facility] )    5  Disorganized schizophrenia ([place] [address] )    6  Acute cholecystitis    7  Schizophrenia, unspecified type ([place] [address] )    8  Hypercalcemia         Postoperative diagnosis: Same  Surgeon: Dmitriy Nguyen     Assistant: None  No qualified resident was available  Blood loss:  Minimal    Specimens:  Left posterior iliac lytic lesion     Findings:  Under CT guidance 18 gauge aspirate and bone marrow biopsy of the lytic lesion performed  Specimen deemed adequate by pathology    Complications: None immediate      Anesthesia: conscious sedation

## 2022-11-08 NOTE — PLAN OF CARE
Problem: OCCUPATIONAL THERAPY ADULT  Goal: Performs self-care activities at highest level of function for planned discharge setting  See evaluation for individualized goals  Description: Treatment Interventions: ADL retraining, Functional transfer training, Patient/family training, Compensatory technique education, Neuromuscular reeducation, Endurance training, Cognitive reorientation          See flowsheet documentation for full assessment, interventions and recommendations  Note: Limitation: Decreased ADL status, Decreased self-care trans, Decreased high-level ADLs, Decreased UE strength, Decreased cognition  Prognosis: Fair  Assessment: Pt is a 58 y o  male seen for OT evaluation at [facility], admitted 11/2/2022 w/ presented with generalized weakness and fatigue  Pt found to have Primary malignant neoplasm of left lung metastatic to other site [facility])  OT completed extensive review of pt's medical and social history  Comorbidities affecting pt's functional performance at time of assessment include: schizophrenia, SIRS, and pedal edema  Personal factors affecting pt at time of IE include:limited home support, difficulty performing ADLS, difficulty performing IADLS , limited insight into deficits and decreased functional mobility  Prior to admission, pt was living alone in an apt with steps to manage  Pt was I w/  ADLS and IADLS, & recently required rollator PTA  Upon evaluation: Pt requires min-mod A for bed mobility, unable to perform functional mobility/transfers, sup-min A for UB ADLs and mod-max A for LB ADLS 2* the following deficits impacting occupational performance: weakness, decreased strength, decreased balance, decreased tolerance and fear of falling  Full objective findings from OT assessment regarding body systems outlined above  Pt to benefit from continued skilled OT tx while in the hospital to address deficits as defined above and maximize level of functional independence w ADL's and functional mobility  Occupational Performance areas to address include: bathing/shower, toilet hygiene, dressing and functional mobility  Based on findings, pt is of high complexity  The patient's raw score on the AM-PAC Daily Activity inpatient short form is 16  , standardized score is 35 96  , less than 39 4  Patients at this level are likely to benefit from DC to post-acute rehabilitation services, which DOES coincide with CURRENT above OT recommendations  However please refer to therapist recommendation for discharge planning given other factors that may influence destination  At this time, OT recommendations at time of discharge are short term rehab       OT Discharge Recommendation: Post acute rehabilitation services

## 2022-11-08 NOTE — DISCHARGE INSTRUCTIONS
Bone Biopsy     WHAT YOU NEED TO KNOW:   A bone biopsy is a procedure to remove a small amount of bone from your bone  Bone marrow is the soft tissue inside your bone that helps to make blood cells  The sample is tested for disease or infection  DISCHARGE INSTRUCTIONS:     1  Limit your activities day of biopsy as directed by your doctor  2  Use medication as ordered  3  Return to your normal diet  Small sips of flat soda will help with nausea  4  Remove band-aid or dressing 24 hours after procedure  Contact Interventional Radiology at [phone] [place] PATIENTS: Contact Interventional Radiology at [phone]) [place] PATIENTS: Contact Interventional Radiology at [phone]) if:    1  Difficulty breathing, nausea or vomiting  2  Chills or fever above 101 F     3  Pain at biopsy site not relieved by medication  4  Develop any redness, swelling, heat, unusual drainage, heavy bruising or bleeding from biopsy site  Thoracentesis   WHAT YOU NEED TO KNOW:   A thoracentesis is a procedure to remove extra fluid or air from between your lungs and your inner chest wall  Air or fluid buildup may make it hard for you to breathe  A thoracentesis allows your lungs to expand fully so you can breathe more easily  DISCHARGE INSTRUCTIONS:   Medicines:   Pain medicine: You may be given a prescription medicine to decrease pain  Do not wait until the pain is severe before you take your medicine  Antibiotics: This medicine helps fight or prevent an infection  Take your medicine as directed  Call your healthcare provider if you think your medicine is not helping or if you have side effects  Tell him if you are allergic to any medicine  Keep a list of the medicines, vitamins, and herbs you take  Include the amounts, and when and why you take them  Bring the list or the pill bottles to follow-up visits  Carry your medicine list with you in case of an emergency    Follow up with your healthcare provider as directed:  Write down your questions so you remember to ask them during your visits  Rest:  Rest when you feel it is needed  Slowly start to do more each day  Return to your daily activities as directed  Do not smoke: If you smoke, it is never too late to quit  Ask for information about how to stop smoking if you need help  Contact your healthcare provider if:   You have a fever  Your puncture site is red, warm, swollen, or draining pus  You have questions or concerns about your procedure, medicine, or care  If you have any questions regarding, call the IR department @ [phone]  Seek care immediately or call 911 if:   Blood soaks through your bandage  There is blood in your spit

## 2022-11-08 NOTE — ASSESSMENT & PLAN NOTE
· Sirs criteria met on admission:  Tachycardia and leukocytosis at 17 36 K without bandemia   · No severe sepsis criteria met   · Suspect reactive from suspected metastatic lung cancer as seen on CT scan   · UA without infection   · Procalcitonin elevated at 11 67-> 3    · ? Elevation secondary to infection or possible small cell lung cancer  · CT scan does show distended gallbladder and with elevated T bili and alk-phos, received IV Zosyn to cover for possible infection  Surgery suggests no acute surgical intervention  · Blood cultures x2, neg  · Dc zosyn 11/7 and observe  Pt had hypothermia/fevers  · Reinstate zosyn   Repeat blood cultures

## 2022-11-08 NOTE — SEDATION DOCUMENTATION
Arrived to IR suite for left iliac bone lesion bx and evaluation for left thoracentesis  U/S performed by Dr Diego Bravo and no thoracentesis is indicated at this time  Bone lesion bx completed in CT scan  Stable for transfer back to room  Report and care assumed by primary RN

## 2022-11-08 NOTE — PLAN OF CARE
Problem: Nutrition/Hydration-ADULT  Goal: Nutrient/Hydration intake appropriate for improving, restoring or maintaining nutritional needs  Description: Monitor and assess patient's nutrition/hydration status for malnutrition  Collaborate with interdisciplinary team and initiate plan and interventions as ordered  Monitor patient's weight and dietary intake as ordered or per policy  Utilize nutrition screening tool and intervene as necessary  Determine patient's food preferences and provide high-protein, high-caloric foods as appropriate       INTERVENTIONS:  - Monitor oral intake, urinary output, labs, and treatment plans  - Assess nutrition and hydration status and recommend course of action  - Evaluate amount of meals eaten  - Assist patient with eating if necessary   - Allow adequate time for meals  - Recommend/ encourage appropriate diets, oral nutritional supplements, and vitamin/mineral supplements  - Order, calculate, and assess calorie counts as needed  - Recommend, monitor, and adjust tube feedings and TPN/PPN based on assessed needs  - Assess need for intravenous fluids  - Provide specific nutrition/hydration education as appropriate  - Include patient/family/caregiver in decisions related to nutrition  Outcome: Progressing     Problem: MOBILITY - ADULT  Goal: Maintain or return to baseline ADL function  Description: INTERVENTIONS:  -  Assess patient's ability to carry out ADLs; assess patient's baseline for ADL function and identify physical deficits which impact ability to perform ADLs (bathing, care of mouth/teeth, toileting, grooming, dressing, etc )  - Assess/evaluate cause of self-care deficits   - Assess range of motion  - Assess patient's mobility; develop plan if impaired  - Assess patient's need for assistive devices and provide as appropriate  - Encourage maximum independence but intervene and supervise when necessary  - Involve family in performance of ADLs  - Assess for home care needs following discharge   - Consider OT consult to assist with ADL evaluation and planning for discharge  - Provide patient education as appropriate  Outcome: Progressing  Goal: Maintains/Returns to pre admission functional level  Description: INTERVENTIONS:  - Perform BMAT or MOVE assessment daily    - Set and communicate daily mobility goal to care team and patient/family/caregiver  - Collaborate with rehabilitation services on mobility goals if consulted  - Perform Range of Motion xx times a day  - Reposition patient every x hours    - Dangle patient x times a day  - Stand patient x times a day  - Ambulate patient x times a day  - Out of bed to chair x times a day   - Out of bed for meals x times a day  - Out of bed for toileting  - Record patient progress and toleration of activity level   Outcome: Progressing     Problem: Prexisting or High Potential for Compromised Skin Integrity  Goal: Skin integrity is maintained or improved  Description: INTERVENTIONS:  - Identify patients at risk for skin breakdown  - Assess and monitor skin integrity  - Assess and monitor nutrition and hydration status  - Monitor labs   - Assess for incontinence   - Turn and reposition patient  - Assist with mobility/ambulation  - Relieve pressure over bony prominences  - Avoid friction and shearing  - Provide appropriate hygiene as needed including keeping skin clean and dry  - Evaluate need for skin moisturizer/barrier cream  - Collaborate with interdisciplinary team   - Patient/family teaching  - Consider wound care consult   Outcome: Progressing     Problem: PAIN - ADULT  Goal: Verbalizes/displays adequate comfort level or baseline comfort level  Description: Interventions:  - Encourage patient to monitor pain and request assistance  - Assess pain using appropriate pain scale  - Administer analgesics based on type and severity of pain and evaluate response  - Implement non-pharmacological measures as appropriate and evaluate response  - Consider cultural and social influences on pain and pain management  - Notify physician/advanced practitioner if interventions unsuccessful or patient reports new pain  Outcome: Progressing     Problem: INFECTION - ADULT  Goal: Absence or prevention of progression during hospitalization  Description: INTERVENTIONS:  - Assess and monitor for signs and symptoms of infection  - Monitor lab/diagnostic results  - Monitor all insertion sites, i e  indwelling lines, tubes, and drains  - Monitor endotracheal if appropriate and nasal secretions for changes in amount and color  - [place] appropriate cooling/warming therapies per order  - Administer medications as ordered  - Instruct and encourage patient and family to use good hand hygiene technique  - Identify and instruct in appropriate isolation precautions for identified infection/condition  Outcome: Progressing  Goal: Absence of fever/infection during neutropenic period  Description: INTERVENTIONS:  - Monitor WBC    Outcome: Progressing     Problem: SAFETY ADULT  Goal: Maintain or return to baseline ADL function  Description: INTERVENTIONS:  -  Assess patient's ability to carry out ADLs; assess patient's baseline for ADL function and identify physical deficits which impact ability to perform ADLs (bathing, care of mouth/teeth, toileting, grooming, dressing, etc )  - Assess/evaluate cause of self-care deficits   - Assess range of motion  - Assess patient's mobility; develop plan if impaired  - Assess patient's need for assistive devices and provide as appropriate  - Encourage maximum independence but intervene and supervise when necessary  - Involve family in performance of ADLs  - Assess for home care needs following discharge   - Consider OT consult to assist with ADL evaluation and planning for discharge  - Provide patient education as appropriate  Outcome: Progressing  Goal: Maintains/Returns to pre admission functional level  Description: INTERVENTIONS:  - Perform BMAT or MOVE assessment daily    - Set and communicate daily mobility goal to care team and patient/family/caregiver  - Collaborate with rehabilitation services on mobility goals if consulted  - Perform Range of Motion x times a day  - Reposition patient every x hours    - Dangle patient x times a day  - Stand patient x times a day  - Ambulate patient x times a day  - Out of bed to chair x times a day   - Out of bed for meals x times a day  - Out of bed for toileting  - Record patient progress and toleration of activity level   Outcome: Progressing  Goal: Patient will remain free of falls  Description: INTERVENTIONS:  - Educate patient/family on patient safety including physical limitations  - Instruct patient to call for assistance with activity   - Consult OT/PT to assist with strengthening/mobility   - Keep Call bell within reach  - Keep bed low and locked with side rails adjusted as appropriate  - Keep care items and personal belongings within reach  - Initiate and maintain comfort rounds  - Make Fall Risk Sign visible to staff  - Offer Toileting every x Hours, in advance of need  - Initiate/Maintain xxalarm  - Obtain necessary fall risk management equipment: xxx  - Apply yellow socks and bracelet for high fall risk patients  - Consider moving patient to room near nurses station  Outcome: Progressing     Problem: DISCHARGE PLANNING  Goal: Discharge to home or other facility with appropriate resources  Description: INTERVENTIONS:  - Identify barriers to discharge w/patient and caregiver  - Arrange for needed discharge resources and transportation as appropriate  - Identify discharge learning needs (meds, wound care, etc )  - Arrange for interpretive services to assist at discharge as needed  - Refer to Case Management Department for coordinating discharge planning if the patient needs post-hospital services based on physician/advanced practitioner order or complex needs related to functional status, cognitive ability, or social support system  Outcome: Progressing     Problem: Knowledge Deficit  Goal: Patient/family/caregiver demonstrates understanding of disease process, treatment plan, medications, and discharge instructions  Description: Complete learning assessment and assess knowledge base    Interventions:  - Provide teaching at level of understanding  - Provide teaching via preferred learning methods  Outcome: Progressing     Problem: Potential for Falls  Goal: Patient will remain free of falls  Description: INTERVENTIONS:  - Educate patient/family on patient safety including physical limitations  - Instruct patient to call for assistance with activity   - Consult OT/PT to assist with strengthening/mobility   - Keep Call bell within reach  - Keep bed low and locked with side rails adjusted as appropriate  - Keep care items and personal belongings within reach  - Initiate and maintain comfort rounds  - Make Fall Risk Sign visible to staff  - Offer Toileting every x Hours, in advance of need  - Initiate/Maintain xalarm  - Obtain necessary fall risk management equipment: x  - Apply yellow socks and bracelet for high fall risk patients  - Consider moving patient to room near nurses station  Outcome: Progressing

## 2022-11-08 NOTE — PROGRESS NOTES
[facility]  Progress Note Feliciano Ram 1960, 58 y o  male MRN: [id number]  Unit/Bed#: -01 Encounter: [id number]  Primary Care Provider: No primary care provider on file  Date and time admitted to hospital: 11/2/2022  9:13 PM    Pedal edema  Assessment & Plan  · B/l pedal edema (R>L) for a couple of days   · Due to probable newly diagnosed metastatic cancer on CT scan, which demonstrated DVTs     Hyponatremia  Assessment & Plan  · resolved    Elevated glucose  Assessment & Plan  · Serum glucose on admission is 233   · No prior reported history of DM, however patient poor historian  ·   hemoglobin A1c 9 1  ·   start on SSI    Hypercalcemia  Assessment & Plan  · PTH low, hypercalcemia of malignancy is etiology   · apprec nephrology assistance  · S/p IVF, lasix, zometa  Follow for improvement  Schizophrenia ([facility] [address] )  Assessment & Plan  · Self-reported history of schizophrenia   · Reportedly treated with clonazepam 100 mg HS, however patient reports non adherence with this   · Presented to the ED with acute psychosis requiring Haldol 5 mg IM x1  · Neuro- psychiatry was consulted because of question about patient's capacity  Patient did not seem to have insight into his condition  He does not have capacity to make medical decisions at this time  Abnormal CT scan, gallbladder  Assessment & Plan  · Distended gallbladder on CT scan   · Obtain right upper quadrant ultrasound patient also with elevated T bili and alk-phos  · Completed zosyn  No acute surgical needs by general surgery   No suspicion for cholecystitis per surg    SIRS (systemic inflammatory response syndrome) (HCC)  Assessment & Plan  · Sirs criteria met on admission:  Tachycardia and leukocytosis at 17 36 K without bandemia   · No severe sepsis criteria met   · Suspect reactive from suspected metastatic lung cancer as seen on CT scan   · UA without infection   · Procalcitonin elevated at 11 67-> 3    · ? Elevation secondary to infection or possible small cell lung cancer  · CT scan does show distended gallbladder and with elevated T bili and alk-phos, received IV Zosyn to cover for possible infection  Surgery suggests no acute surgical intervention  · Blood cultures x2, neg  · Dc zosyn  and observe  Pt had hypothermia/fevers  · Reinstate zosyn  Repeat blood cultures    * Suspected Primary malignant neoplasm of left lung metastatic to other site [facility])  Assessment & Plan  · Presents with generalized weakness, fatigue, and weight loss  · CT a/P: " 2 5 x 2 2 x 2 2 cm mass in the left lower lobe concerning for lung cancer  Lytic lesions within the left 6th rib and pelvis bones compatible with osseous metastasis "  · Longstanding smoking history  · Will ask pulm to evaluate  · Heme-Onc recommended tissue diagnosis with complete staging  They recommended patient will need anticoagulation which will be lifelong based on probable stage IV malignancy causing hypercoagulable state  · IR tissue biopsy pending  · Palliative care is on board         VTE  Prophylaxis:   Pharmacologic: in place    Patient Centered Rounds: I have performed bedside rounds with nursing staff today  Discussions with Specialists or Other Care Team Provider: case management    Education and Discussions with Family / Patient: pt      Current Length of Stay: 5 day(s)    Current Patient Status: Inpatient        Code Status: Level 1 - Full Code      Subjective: pt awaiting bx  Denies dyspnea or chest pain    Patient is seen and examined at bedside  All other ROS are negative  Objective:     Vitals:   Temp (24hrs), Av 3 °F (36 8 °C), Min:97 7 °F (36 5 °C), Max:99 2 °F (37 3 °C)    Temp:  [97 7 °F (36 5 °C)-99 2 °F (37 3 °C)] 98 1 °F (36 7 °C)  HR:  [108] 108  Resp:  [21] 21  BP: (107-110)/(67-71) 107/67  SpO2:  [93 %-94 %] 93 %  Body mass index is 23 63 kg/m²  Input and Output Summary (last 24 hours):        Intake/Output Summary (Last 24 hours) at 11/8/2022 0958  Last data filed at 11/8/2022 0401  Gross per 24 hour   Intake 420 ml   Output 1100 ml   Net -680 ml       Physical Exam:       GEN: No acute distress, comfortable, chronically ill appearing  HEEENT: No JVD, PERRLA, no scleral icterus  RESP: Lungs clear to auscultation bilaterally  CV: RRR, +s1/s2   ABD: SOFT NON TENDER, POSITIVE BOWEL SOUNDS, NO DISTENTION  PSYCH: CALM  NEURO: , NO FOCAL DEFICITS  SKIN: NO RASH  EXTREM: NO EDEMA    Additional Data:     Labs:    Results from last 7 days   Lab Units 11/08/22  0522   WBC Thousand/uL 14 63*   HEMOGLOBIN g/dL 8 0*   HEMATOCRIT % 24 4*   PLATELETS Thousands/uL 184   NEUTROS PCT % 82*   LYMPHS PCT % 9*   MONOS PCT % 7   EOS PCT % 1     Results from last 7 days   Lab Units 11/08/22  0522   SODIUM mmol/L 138   POTASSIUM mmol/L 3 6   CHLORIDE mmol/L 100   CO2 mmol/L 35*   BUN mg/dL 21   CREATININE mg/dL 1 17   ANION GAP mmol/L 3*   CALCIUM mg/dL 11 6*   ALBUMIN g/dL 2 1*   TOTAL BILIRUBIN mg/dL 0 70   ALK PHOS U/L 89   ALT U/L 12   AST U/L 11   GLUCOSE RANDOM mg/dL 170*     Results from last 7 days   Lab Units 11/03/22  1234   INR  1 08     Results from last 7 days   Lab Units 11/08/22  0750 11/07/22  2228 11/07/22  1613 11/07/22  1122 11/07/22  0747 11/06/22  2114 11/06/22  1605 11/06/22  1123 11/06/22  0746 11/05/22  2110 11/05/22  1611 11/05/22  1129   POC GLUCOSE mg/dl 179* 195* 176* 163* 226* 237* 187* 199* 206* 199* 196* 219*     Results from last 7 days   Lab Units 11/02/22  2349   HEMOGLOBIN A1C % 9 1*     Results from last 7 days   Lab Units 11/04/22  0254 11/03/22  0442 11/02/22  2349   LACTIC ACID mmol/L  --   --  1 1   PROCALCITONIN ng/ml 3 26* 10 74* 11 67*           * I Have Reviewed All Lab Data Listed Above  Imaging:     Results for orders placed during the hospital encounter of 11/02/22    XR chest portable    Narrative  CHEST    INDICATION:   Cough, tachycardia, weakness      COMPARISON:  Abdomen CT 11/3/2022, CXR 3/20/2006  EXAM PERFORMED/VIEWS:  XR CHEST PORTABLE      FINDINGS:    Cardiomediastinal silhouette appears unremarkable  2 6 cm solid left lower lobe mass corresponding with the abdomen CT   4 cm cavitary right upper lobe mass  No acute disease  No effusion or pneumothorax  Left lateral 6th rib metastasis corresponding with the abdomen CT probable posterior left 5th rib metastasis  Impression  Right upper lobe cavitary mass, left lower lobe mass, and left rib metastases, some which are visible on the abdomen CT  Findings on the abdomen CT were reported to the emergency physician by Dr Diane Abraham  Workstation performed: [id number]    No results found for this or any previous visit  *I have reviewed all imaging reports listed above      Recent Cultures (last 7 days):     Results from last 7 days   Lab Units 11/02/22  1569   BLOOD CULTURE  No Growth After 5 Days  No Growth After 5 Days         Last 24 Hours Medication List:   Current Facility-Administered Medications   Medication Dose Route Frequency Provider Last Rate   • acetaminophen  650 mg Oral Q8H PRN Kai Martino PA-C     • heparin (porcine)  3-30 Units/kg/hr (Order-Specific) Intravenous Titrated Claudio Arvizu MD 20 Units/kg/hr (11/07/22 2223)   • influenza vaccine  0 5 mL Intramuscular Prior to discharge Kai Martino PA-C     • insulin glargine  8 Units Subcutaneous HS Claudio Arvizu MD     • insulin lispro  1-5 Units Subcutaneous TID AC Kai Martino PA-C     • insulin lispro  1-5 Units Subcutaneous HS Kai Martino PA-C     • insulin lispro  3 Units Subcutaneous TID With Meals Kai Martino PA-C     • lidocaine  1 patch Topical Daily Claudio Arvizu MD     • lidocaine  1 patch Topical Daily Claudio Arvizu MD     • nicotine  1 patch Transdermal Daily Kai Martino PA-C     • OLANZapine  5 mg Oral HS Claudio Arvizu MD     • ondansetron  4 mg Intravenous Q6H PRN Griffin Dominguez PA-C     • oxyCODONE  5 mg Oral Q6H PRN Cal Muniz PA-C     • piperacillin-tazobactam  3 375 g Intravenous Q6H Roxann Bradley MD 3 375 g (11/07/22 3046)   • senna  1 tablet Oral HS PRN Griffin Dominguez PA-C     • sodium chloride  80 mL/hr Intravenous Continuous Blanca Desai MD 80 mL/hr (11/08/22 0401)        Today, Patient Was Seen By: Roxann Bradley    ** Please Note: Dictation voice to text software may have been used in the creation of this document   **

## 2022-11-08 NOTE — PLAN OF CARE
Problem: Nutrition/Hydration-ADULT  Goal: Nutrient/Hydration intake appropriate for improving, restoring or maintaining nutritional needs  Description: Monitor and assess patient's nutrition/hydration status for malnutrition  Collaborate with interdisciplinary team and initiate plan and interventions as ordered  Monitor patient's weight and dietary intake as ordered or per policy  Utilize nutrition screening tool and intervene as necessary  Determine patient's food preferences and provide high-protein, high-caloric foods as appropriate       INTERVENTIONS:  - Monitor oral intake, urinary output, labs, and treatment plans  - Assess nutrition and hydration status and recommend course of action  - Evaluate amount of meals eaten  - Assist patient with eating if necessary   - Allow adequate time for meals  - Recommend/ encourage appropriate diets, oral nutritional supplements, and vitamin/mineral supplements  - Order, calculate, and assess calorie counts as needed  - Recommend, monitor, and adjust tube feedings and TPN/PPN based on assessed needs  - Assess need for intravenous fluids  - Provide specific nutrition/hydration education as appropriate  - Include patient/family/caregiver in decisions related to nutrition  Outcome: Progressing     Problem: MOBILITY - ADULT  Goal: Maintain or return to baseline ADL function  Description: INTERVENTIONS:  -  Assess patient's ability to carry out ADLs; assess patient's baseline for ADL function and identify physical deficits which impact ability to perform ADLs (bathing, care of mouth/teeth, toileting, grooming, dressing, etc )  - Assess/evaluate cause of self-care deficits   - Assess range of motion  - Assess patient's mobility; develop plan if impaired  - Assess patient's need for assistive devices and provide as appropriate  - Encourage maximum independence but intervene and supervise when necessary  - Involve family in performance of ADLs  - Assess for home care needs following discharge   - Consider OT consult to assist with ADL evaluation and planning for discharge  - Provide patient education as appropriate  Outcome: Progressing  Goal: Maintains/Returns to pre admission functional level  Description: INTERVENTIONS:  - Perform BMAT or MOVE assessment daily    - Set and communicate daily mobility goal to care team and patient/family/caregiver  - Collaborate with rehabilitation services on mobility goals if consulted  - Perform Range of Motion 2 times a day  - Reposition patient every 2 hours    - Dangle patient 2 times a day  - Stand patient 2 times a day  - Ambulate patient 2 times a day  - Out of bed to chair 2 times a day   - Out of bed for meals 2 times a day  - Out of bed for toileting  - Record patient progress and toleration of activity level   Outcome: Progressing     Problem: Prexisting or High Potential for Compromised Skin Integrity  Goal: Skin integrity is maintained or improved  Description: INTERVENTIONS:  - Identify patients at risk for skin breakdown  - Assess and monitor skin integrity  - Assess and monitor nutrition and hydration status  - Monitor labs   - Assess for incontinence   - Turn and reposition patient  - Assist with mobility/ambulation  - Relieve pressure over bony prominences  - Avoid friction and shearing  - Provide appropriate hygiene as needed including keeping skin clean and dry  - Evaluate need for skin moisturizer/barrier cream  - Collaborate with interdisciplinary team   - Patient/family teaching  - Consider wound care consult   Outcome: Progressing     Problem: PAIN - ADULT  Goal: Verbalizes/displays adequate comfort level or baseline comfort level  Description: Interventions:  - Encourage patient to monitor pain and request assistance  - Assess pain using appropriate pain scale  - Administer analgesics based on type and severity of pain and evaluate response  - Implement non-pharmacological measures as appropriate and evaluate response  - Consider cultural and social influences on pain and pain management  - Notify physician/advanced practitioner if interventions unsuccessful or patient reports new pain  Outcome: Progressing     Problem: INFECTION - ADULT  Goal: Absence or prevention of progression during hospitalization  Description: INTERVENTIONS:  - Assess and monitor for signs and symptoms of infection  - Monitor lab/diagnostic results  - Monitor all insertion sites, i e  indwelling lines, tubes, and drains  - Monitor endotracheal if appropriate and nasal secretions for changes in amount and color  - [place] appropriate cooling/warming therapies per order  - Administer medications as ordered  - Instruct and encourage patient and family to use good hand hygiene technique  - Identify and instruct in appropriate isolation precautions for identified infection/condition  Outcome: Progressing  Goal: Absence of fever/infection during neutropenic period  Description: INTERVENTIONS:  - Monitor WBC    Outcome: Progressing     Problem: SAFETY ADULT  Goal: Maintain or return to baseline ADL function  Description: INTERVENTIONS:  -  Assess patient's ability to carry out ADLs; assess patient's baseline for ADL function and identify physical deficits which impact ability to perform ADLs (bathing, care of mouth/teeth, toileting, grooming, dressing, etc )  - Assess/evaluate cause of self-care deficits   - Assess range of motion  - Assess patient's mobility; develop plan if impaired  - Assess patient's need for assistive devices and provide as appropriate  - Encourage maximum independence but intervene and supervise when necessary  - Involve family in performance of ADLs  - Assess for home care needs following discharge   - Consider OT consult to assist with ADL evaluation and planning for discharge  - Provide patient education as appropriate  Outcome: Progressing  Goal: Maintains/Returns to pre admission functional level  Description: INTERVENTIONS:  - Perform BMAT or MOVE assessment daily    - Set and communicate daily mobility goal to care team and patient/family/caregiver  - Collaborate with rehabilitation services on mobility goals if consulted  - Perform Range of Motion 2 times a day  - Reposition patient every 2 hours    - Dangle patient 2 times a day  - Stand patient 2 times a day  - Ambulate patient 2 times a day  - Out of bed to chair 2 times a day   - Out of bed for meals 2 times a day  - Out of bed for toileting  - Record patient progress and toleration of activity level   Outcome: Progressing  Goal: Patient will remain free of falls  Description: INTERVENTIONS:  - Educate patient/family on patient safety including physical limitations  - Instruct patient to call for assistance with activity   - Consult OT/PT to assist with strengthening/mobility   - Keep Call bell within reach  - Keep bed low and locked with side rails adjusted as appropriate  - Keep care items and personal belongings within reach  - Initiate and maintain comfort rounds  - Make Fall Risk Sign visible to staff  - Offer Toileting every 2 Hours, in advance of need  - Initiate/Maintain bed alarm  - Obtain necessary fall risk management equipment: socks  - Apply yellow socks and bracelet for high fall risk patients  - Consider moving patient to room near nurses station  Outcome: Progressing     Problem: DISCHARGE PLANNING  Goal: Discharge to home or other facility with appropriate resources  Description: INTERVENTIONS:  - Identify barriers to discharge w/patient and caregiver  - Arrange for needed discharge resources and transportation as appropriate  - Identify discharge learning needs (meds, wound care, etc )  - Arrange for interpretive services to assist at discharge as needed  - Refer to Case Management Department for coordinating discharge planning if the patient needs post-hospital services based on physician/advanced practitioner order or complex needs related to functional status, cognitive ability, or social support system  Outcome: Progressing     Problem: Knowledge Deficit  Goal: Patient/family/caregiver demonstrates understanding of disease process, treatment plan, medications, and discharge instructions  Description: Complete learning assessment and assess knowledge base    Interventions:  - Provide teaching at level of understanding  - Provide teaching via preferred learning methods  Outcome: Progressing     Problem: Potential for Falls  Goal: Patient will remain free of falls  Description: INTERVENTIONS:  - Educate patient/family on patient safety including physical limitations  - Instruct patient to call for assistance with activity   - Consult OT/PT to assist with strengthening/mobility   - Keep Call bell within reach  - Keep bed low and locked with side rails adjusted as appropriate  - Keep care items and personal belongings within reach  - Initiate and maintain comfort rounds  - Make Fall Risk Sign visible to staff  - Offer Toileting every 2 Hours, in advance of need  - Initiate/Maintain bed alarm  - Obtain necessary fall risk management equipment: socks  - Apply yellow socks and bracelet for high fall risk patients  - Consider moving patient to room near nurses station  Outcome: Progressing

## 2022-11-08 NOTE — OCCUPATIONAL THERAPY NOTE
Occupational Therapy Evaluation & Treat       Patient Name: Rony Chávez  YPHHO'Z Date: 11/8/2022  Problem List  Principal Problem:    Suspected Primary malignant neoplasm of left lung metastatic to other site [facility])  Active Problems:    SIRS (systemic inflammatory response syndrome) (HCC)    Abnormal CT scan, gallbladder    Schizophrenia (HCC)    Hypercalcemia    Elevated glucose    Hyponatremia    Pedal edema    Past Medical History  Past Medical History:   Diagnosis Date    Bladder infection      Past Surgical History  Past Surgical History:   Procedure Laterality Date    IR BIOPSY BONE  11/8/2022    IR THORACENTESIS  11/8/2022 11/08/22 1546   OT Last Visit   OT Visit Date 11/08/22   Note Type   Note type Evaluation  (& Treat)   Pain Assessment   Pain Assessment Tool [place] FACES   [place] FACES Pain Rating 4   Pain Location/Orientation Location: Generalized   Restrictions/Precautions   Weight Bearing Precautions Per Order No   Other Precautions Fall Risk;Bed Alarm;Cognitive;Pain   Home Living   Type of Home Apartment   Bathroom Shower/Tub Walk-in shower   Bathroom Toilet Standard   Bathroom Equipment Grab bars in [facility] Other (Comment)  (Rollator)   Prior Function   Level of [place] Independent with ADLs   Lives With Alone   Receives Help From Friend(s)   IADLs Family/Friend/Other provides transportation   Falls in the last 6 months 0   Vocational On disability   Subjective   Subjective Pt states "I hurt all over"   ADL   Eating Assistance Unable to assess   Eating Deficit NPO   Grooming Assistance 5  Supervision/Setup   UB Bathing Assistance 4  Minimal Assistance   LB Bathing Assistance 3  Moderate Assistance   UB Dressing Assistance 4  Minimal Assistance   LB Dressing Assistance 2  Maximal Assistance   Toileting Assistance  2  Maximal Assistance   Bed Mobility   Rolling R 4  Minimal assistance   Additional items Assist x 1;Verbal cues   Rolling L 4  Minimal assistance   Additional items Assist x 1;Verbal cues   Supine to Sit 3  Moderate assistance   Additional items Assist x 1;Verbal cues   Sit to Supine 4  Minimal assistance   Additional items Assist x 1;Verbal cues   Transfers   Sit to Stand Unable to assess  (Pt deferred any attempts at)   Balance   Static Sitting Good   Dynamic Sitting Fair +   Activity Tolerance   Activity Tolerance Patient limited by pain   Medical Staff Made Aware GALEN OROZCO Assessment   RUE Assessment X   RUE Strength   RUE Overall Strength   (Pt reports baseline "paralysis" since ~1 month ago  R shoulder 3-/5, elboe 3/5, hand 2-/5 to 3-/5 (increased weakness noted in ulnar nerve distribution))   LUE Assessment   LUE Assessment WFL   Hand Function   Fine Motor Coordination Impaired   Hand Function Comments RUE   Cognition   Overall Cognitive Status Impaired   Arousal/Participation Alert   Attention Within functional limits   Orientation Level Oriented to person;Oriented to place   Memory Decreased recall of precautions   Following Commands Follows one step commands with increased time or repetition   Comments Pt with poor insight   Assessment   Limitation Decreased ADL status; Decreased self-care trans;Decreased high-level ADLs; Decreased UE strength;Decreased cognition   Prognosis Fair   Assessment Pt is a 58 y o  male seen for OT evaluation at [address] [place] [address], admitted 11/2/2022 w/ presented with generalized weakness and fatigue  Pt found to have Primary malignant neoplasm of left lung metastatic to other site [facility])  OT completed extensive review of pt's medical and social history  Comorbidities affecting pt's functional performance at time of assessment include: schizophrenia, SIRS, and pedal edema  Personal factors affecting pt at time of IE include:limited home support, difficulty performing ADLS, difficulty performing IADLS , limited insight into deficits and decreased functional mobility   Prior to admission, pt was living alone in an apt with steps to manage  Pt was I w/  ADLS and IADLS, & recently required rollator PTA  Upon evaluation: Pt requires min-mod A for bed mobility, unable to perform functional mobility/transfers, sup-min A for UB ADLs and mod-max A for LB ADLS 2* the following deficits impacting occupational performance: weakness, decreased strength, decreased balance, decreased tolerance and fear of falling  Full objective findings from OT assessment regarding body systems outlined above  Pt to benefit from continued skilled OT tx while in the hospital to address deficits as defined above and maximize level of functional independence w ADL's and functional mobility  Occupational Performance areas to address include: bathing/shower, toilet hygiene, dressing and functional mobility  Based on findings, pt is of high complexity  The patient's raw score on the AM-PAC Daily Activity inpatient short form is 16  , standardized score is 35 96  , less than 39 4  Patients at this level are likely to benefit from DC to post-acute rehabilitation services, which DOES coincide with CURRENT above OT recommendations  However please refer to therapist recommendation for discharge planning given other factors that may influence destination  At this time, OT recommendations at time of discharge are short term rehab  Goals   Patient Goals Pt wishes to get better   Plan   Treatment Interventions ADL retraining;Functional transfer training;Patient/family training; Compensatory technique education; Neuromuscular reeducation; Endurance training;Cognitive reorientation   Goal Expiration Date 11/18/22   OT Treatment Day 0   OT Frequency 3-5x/wk   Recommendation   OT Discharge Recommendation Post acute rehabilitation services   AM-[facility] Daily Activity Inpatient   Lower Body Dressing 2   Bathing 2   Toileting 2   Upper Body Dressing 3   Grooming 3   Eating 4   Daily Activity Raw Score 16   Daily Activity Standardized Score (Calc for Raw Score >=11) 35 96   AM-PAC Applied Cognition Inpatient   Following a Speech/Presentation 2   Understanding Ordinary Conversation 3   Taking Medications 2   Remembering Where Things Are Placed or Put Away 3   Remembering List of 4-5 Errands 3   Taking Care of Complicated Tasks 2   Applied Cognition Raw Score 15   Applied Cognition Standardized Score 33 54   Additional Treatment Session   Start Time 1530   End Time 1546   Treatment Assessment Pt seen for OT tx session with focus on functional balance, functional mobility, ADL status, and transfer safety  Pt seated at EOB  Performed UB dressing with min A  Pt encouraged to perform sit>stand transfer in order to facilitate linen change at pt's bed noted to soiled  Despite max encouragement, pt deferred  Pt performed 2 lateral scoots with max Ax 1  Pt returned to supine with min A x 1 to manage RLE  Pt performed multiple rolls in bed to facilitate linen change  Patient continues to be functioning below baseline level, occupational performance remains limited secondary to factors listed above, and pt at increased risk for falls and injury  From OT standpoint, recommendation at time of d/c would be Short Term Rehab  Patient to benefit from continued Occupational Therapy treatment while in the hospital to address deficits as defined above and maximize level of functional independence with ADLs and functional mobility  Pt left with call bell in reach, tray table in reach, needs met, bed alarm activated  RN aware  Pt will achieve the following goals within 10 days  *Pt will complete UB bathing and dressing with supervision      *Pt will complete LB bathing and dressing with min A     * Pt will complete toileting w/ supervision w/ G hygiene/thoroughness using DME PRN    *Pt will complete bed mobility with mod I, with bed flat and no side rail to prep for purposeful tasks    *Pt will perform functional transfers with on/off all surfaces with min A x 1 using DME as needed w/ G balance/safety  *Pt will participate in UE therapeutic exercise in order to maximize strength for ADL transfers  *Pt will identify 3-5 fall risks during ADL routine to ensure home safety upon discharge  *Pt will improve functional mobility during ADL/IADL/leisure tasks to mod I using DME as needed w/ G balance/safety         Oniel Perla, OTR/L

## 2022-11-08 NOTE — BRIEF OP NOTE (RAD/CATH)
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

## 2022-11-08 NOTE — ASSESSMENT & PLAN NOTE
· PTH low, hypercalcemia of malignancy is etiology   · apprec nephrology assistance  · S/p IVF, lasix, zometa  Follow for improvement

## 2022-11-09 PROBLEM — E87.6 HYPOKALEMIA: Status: ACTIVE | Noted: 2022-11-09

## 2022-11-09 LAB
ALBUMIN SERPL BCP-MCNC: 2.1 G/DL (ref 3.5–5)
ALP SERPL-CCNC: 90 U/L (ref 46–116)
ALT SERPL W P-5'-P-CCNC: 12 U/L (ref 12–78)
ANION GAP SERPL CALCULATED.3IONS-SCNC: 4 MMOL/L (ref 4–13)
APTT PPP: 76 SECONDS (ref 23–37)
AST SERPL W P-5'-P-CCNC: 14 U/L (ref 5–45)
BASOPHILS # BLD AUTO: 0.03 THOUSANDS/ÂΜL (ref 0–0.1)
BASOPHILS NFR BLD AUTO: 0 % (ref 0–1)
BILIRUB SERPL-MCNC: 0.7 MG/DL (ref 0.2–1)
BUN SERPL-MCNC: 26 MG/DL (ref 5–25)
CALCIUM ALBUM COR SERPL-MCNC: 11.4 MG/DL (ref 8.3–10.1)
CALCIUM SERPL-MCNC: 9.9 MG/DL (ref 8.3–10.1)
CHLORIDE SERPL-SCNC: 98 MMOL/L (ref 96–108)
CO2 SERPL-SCNC: 34 MMOL/L (ref 21–32)
CREAT SERPL-MCNC: 1.22 MG/DL (ref 0.6–1.3)
EOSINOPHIL # BLD AUTO: 0.07 THOUSAND/ÂΜL (ref 0–0.61)
EOSINOPHIL NFR BLD AUTO: 1 % (ref 0–6)
ERYTHROCYTE [DISTWIDTH] IN BLOOD BY AUTOMATED COUNT: 16.2 % (ref 11.6–15.1)
GFR SERPL CREATININE-BSD FRML MDRD: 63 ML/MIN/1.73SQ M
GLUCOSE SERPL-MCNC: 109 MG/DL (ref 65–140)
GLUCOSE SERPL-MCNC: 148 MG/DL (ref 65–140)
GLUCOSE SERPL-MCNC: 158 MG/DL (ref 65–140)
GLUCOSE SERPL-MCNC: 160 MG/DL (ref 65–140)
GLUCOSE SERPL-MCNC: 207 MG/DL (ref 65–140)
HCT VFR BLD AUTO: 24 % (ref 36.5–49.3)
HGB BLD-MCNC: 8.1 G/DL (ref 12–17)
IMM GRANULOCYTES # BLD AUTO: 0.1 THOUSAND/UL (ref 0–0.2)
IMM GRANULOCYTES NFR BLD AUTO: 1 % (ref 0–2)
INR PPP: 1.14 (ref 0.84–1.19)
LYMPHOCYTES # BLD AUTO: 1.51 THOUSANDS/ÂΜL (ref 0.6–4.47)
LYMPHOCYTES NFR BLD AUTO: 13 % (ref 14–44)
MCH RBC QN AUTO: 32 PG (ref 26.8–34.3)
MCHC RBC AUTO-ENTMCNC: 33.8 G/DL (ref 31.4–37.4)
MCV RBC AUTO: 95 FL (ref 82–98)
MONOCYTES # BLD AUTO: 0.99 THOUSAND/ÂΜL (ref 0.17–1.22)
MONOCYTES NFR BLD AUTO: 8 % (ref 4–12)
NEUTROPHILS # BLD AUTO: 9.41 THOUSANDS/ÂΜL (ref 1.85–7.62)
NEUTS SEG NFR BLD AUTO: 77 % (ref 43–75)
NRBC BLD AUTO-RTO: 0 /100 WBCS
PLATELET # BLD AUTO: 158 THOUSANDS/UL (ref 149–390)
PMV BLD AUTO: 10.8 FL (ref 8.9–12.7)
POTASSIUM SERPL-SCNC: 2.8 MMOL/L (ref 3.5–5.3)
PROT SERPL-MCNC: 6.1 G/DL (ref 6.4–8.4)
PROTHROMBIN TIME: 15.3 SECONDS (ref 11.6–14.5)
RBC # BLD AUTO: 2.53 MILLION/UL (ref 3.88–5.62)
SODIUM SERPL-SCNC: 136 MMOL/L (ref 135–147)
WBC # BLD AUTO: 12.11 THOUSAND/UL (ref 4.31–10.16)

## 2022-11-09 RX ORDER — POTASSIUM CHLORIDE 29.8 MG/ML
40 INJECTION INTRAVENOUS ONCE
Status: DISCONTINUED | OUTPATIENT
Start: 2022-11-09 | End: 2022-11-09

## 2022-11-09 RX ORDER — POTASSIUM CHLORIDE 20 MEQ/1
40 TABLET, EXTENDED RELEASE ORAL ONCE
Status: COMPLETED | OUTPATIENT
Start: 2022-11-09 | End: 2022-11-09

## 2022-11-09 RX ORDER — OXYCODONE HYDROCHLORIDE 5 MG/1
10 TABLET ORAL EVERY 4 HOURS PRN
Status: DISCONTINUED | OUTPATIENT
Start: 2022-11-09 | End: 2022-11-15

## 2022-11-09 RX ORDER — POTASSIUM CHLORIDE 14.9 MG/ML
20 INJECTION INTRAVENOUS ONCE
Status: COMPLETED | OUTPATIENT
Start: 2022-11-09 | End: 2022-11-09

## 2022-11-09 RX ORDER — OXYCODONE HYDROCHLORIDE 5 MG/1
5 TABLET ORAL EVERY 6 HOURS PRN
Status: DISCONTINUED | OUTPATIENT
Start: 2022-11-09 | End: 2022-11-15

## 2022-11-09 RX ADMIN — POTASSIUM CHLORIDE 40 MEQ: 1500 TABLET, EXTENDED RELEASE ORAL at 07:56

## 2022-11-09 RX ADMIN — PIPERACILLIN AND TAZOBACTAM 3.38 G: 3; .375 INJECTION, POWDER, LYOPHILIZED, FOR SOLUTION INTRAVENOUS at 17:50

## 2022-11-09 RX ADMIN — INSULIN LISPRO 1 UNITS: 100 INJECTION, SOLUTION INTRAVENOUS; SUBCUTANEOUS at 08:01

## 2022-11-09 RX ADMIN — NICOTINE 1 PATCH: 21 PATCH, EXTENDED RELEASE TRANSDERMAL at 07:58

## 2022-11-09 RX ADMIN — OXYCODONE HYDROCHLORIDE 10 MG: 5 TABLET ORAL at 16:15

## 2022-11-09 RX ADMIN — HEPARIN SODIUM 20 UNITS/KG/HR: 10000 INJECTION, SOLUTION INTRAVENOUS at 11:28

## 2022-11-09 RX ADMIN — OXYCODONE HYDROCHLORIDE 10 MG: 5 TABLET ORAL at 07:56

## 2022-11-09 RX ADMIN — PIPERACILLIN AND TAZOBACTAM 3.38 G: 3; .375 INJECTION, POWDER, LYOPHILIZED, FOR SOLUTION INTRAVENOUS at 06:02

## 2022-11-09 RX ADMIN — INSULIN LISPRO 1 UNITS: 100 INJECTION, SOLUTION INTRAVENOUS; SUBCUTANEOUS at 11:58

## 2022-11-09 RX ADMIN — POTASSIUM CHLORIDE 20 MEQ: 14.9 INJECTION, SOLUTION INTRAVENOUS at 09:31

## 2022-11-09 RX ADMIN — OXYCODONE HYDROCHLORIDE 10 MG: 5 TABLET ORAL at 23:29

## 2022-11-09 RX ADMIN — POTASSIUM CHLORIDE 20 MEQ: 14.9 INJECTION, SOLUTION INTRAVENOUS at 07:44

## 2022-11-09 RX ADMIN — OXYCODONE HYDROCHLORIDE 5 MG: 5 TABLET ORAL at 05:43

## 2022-11-09 RX ADMIN — INSULIN LISPRO 3 UNITS: 100 INJECTION, SOLUTION INTRAVENOUS; SUBCUTANEOUS at 17:49

## 2022-11-09 RX ADMIN — PIPERACILLIN AND TAZOBACTAM 3.38 G: 3; .375 INJECTION, POWDER, LYOPHILIZED, FOR SOLUTION INTRAVENOUS at 23:21

## 2022-11-09 RX ADMIN — OLANZAPINE 5 MG: 5 TABLET, FILM COATED ORAL at 21:08

## 2022-11-09 RX ADMIN — INSULIN GLARGINE 8 UNITS: 100 INJECTION, SOLUTION SUBCUTANEOUS at 21:09

## 2022-11-09 RX ADMIN — INSULIN LISPRO 3 UNITS: 100 INJECTION, SOLUTION INTRAVENOUS; SUBCUTANEOUS at 08:01

## 2022-11-09 RX ADMIN — OXYCODONE HYDROCHLORIDE 10 MG: 5 TABLET ORAL at 11:57

## 2022-11-09 RX ADMIN — PIPERACILLIN AND TAZOBACTAM 3.38 G: 3; .375 INJECTION, POWDER, LYOPHILIZED, FOR SOLUTION INTRAVENOUS at 12:00

## 2022-11-09 RX ADMIN — INSULIN LISPRO 3 UNITS: 100 INJECTION, SOLUTION INTRAVENOUS; SUBCUTANEOUS at 11:58

## 2022-11-09 NOTE — PROGRESS NOTES
[place]  Progress Note Susie Oliva 1960, 58 y o  male MRN: [id number]  Unit/Bed#: -01 Encounter: [id number]  Primary Care Provider: No primary care provider on file  Date and time admitted to hospital: 11/2/2022  9:13 PM    Hypokalemia  Assessment & Plan  Replete aggressively  Pedal edema  Assessment & Plan  · B/l pedal edema (R>L) for a couple of days   · Due to probable newly diagnosed metastatic cancer on CT scan, which demonstrated DVTs     Hyponatremia  Assessment & Plan  · resolved    Elevated glucose  Assessment & Plan  · Serum glucose on admission is 233   · No prior reported history of DM, however patient poor historian  ·   hemoglobin A1c 9 1  ·   start on SSI    Hypercalcemia  Assessment & Plan  · PTH low, hypercalcemia of malignancy is etiology   · apprec nephrology assistance  · S/p IVF, lasix, zometa  Slowly improving  Schizophrenia ([facility] [address] )  Assessment & Plan  · Self-reported history of schizophrenia   · Reportedly treated with clonazepam 100 mg HS, however patient reports non adherence with this   · Presented to the ED with acute psychosis requiring Haldol 5 mg IM x1  · Neuro- psychiatry was consulted because of question about patient's capacity  Patient did not seem to have insight into his condition  He does not have capacity to make medical decisions at this time  Abnormal CT scan, gallbladder  Assessment & Plan  · Distended gallbladder on CT scan   · Obtain right upper quadrant ultrasound patient also with elevated T bili and alk-phos  · Completed zosyn  No acute surgical needs by general surgery   No suspicion for cholecystitis per surg    SIRS (systemic inflammatory response syndrome) (HCC)  Assessment & Plan  · Sirs criteria met on admission:  Tachycardia and leukocytosis at 17 36 K without bandemia   · No severe sepsis criteria met   · Suspect reactive from suspected metastatic lung cancer as seen on CT scan   · UA without infection · Procalcitonin elevated at 11 67-> 3    · ? Elevation secondary to infection or possible small cell lung cancer  · CT scan does show distended gallbladder and with elevated T bili and alk-phos, received IV Zosyn to cover for possible infection  Surgery suggests no acute surgical intervention  · Blood cultures x2, neg  · Dc zosyn  and observe  Pt had hypothermia/fevers  · Reinstate zosyn  Repeat blood cultures pending  * Suspected Primary malignant neoplasm of left lung metastatic to other site [facility])  Assessment & Plan  · Presents with generalized weakness, fatigue, and weight loss  · CT a/P: " 2 5 x 2 2 x 2 2 cm mass in the left lower lobe concerning for lung cancer  Lytic lesions within the left 6th rib and pelvis bones compatible with osseous metastasis "  · Longstanding smoking history  · Will ask pulm to evaluate  · Heme-Onc recommended tissue diagnosis with complete staging  They recommended patient will need anticoagulation which will be lifelong based on probable stage IV malignancy causing hypercoagulable state  · IR tissue biopsy performed -   · Palliative care is on board       VTE  Prophylaxis:   Pharmacologic: in place    Patient Centered Rounds: I have performed bedside rounds with nursing staff today  Discussions with Specialists or Other Care Team Provider: case management    Education and Discussions with Family / Patient: pt    Current Length of Stay: 6 day(s)    Current Patient Status: Inpatient        Code Status: Level 1 - Full Code      Subjective:     Pt seen  Denies sob, chest pain    Patient is seen and examined at bedside  All other ROS are negative  Objective:     Vitals:   Temp (24hrs), Av 3 °F (36 8 °C), Min:98 2 °F (36 8 °C), Max:98 3 °F (36 8 °C)    Temp:  [98 2 °F (36 8 °C)-98 3 °F (36 8 °C)] 98 3 °F (36 8 °C)  HR:  [103-129] 103  Resp:  [14-23] 19  BP: (123-177)/(72-98) 123/72  SpO2:  [94 %-99 %] 94 %  Body mass index is 23 63 kg/m²       Input and Output Summary (last 24 hours): Intake/Output Summary (Last 24 hours) at 11/9/2022 0917  Last data filed at 11/9/2022 0751  Gross per 24 hour   Intake 360 ml   Output 1500 ml   Net -1140 ml       Physical Exam:       GEN: No acute distress, comfortable chronically ill male  HEEENT: No JVD, PERRLA, no scleral icterus  RESP: Lungs clear to auscultation bilaterally  CV: RRR, +s1/s2   ABD: SOFT NON TENDER, POSITIVE BOWEL SOUNDS, NO DISTENTION  PSYCH: CALM  NEURO:  NO FOCAL DEFICITS  SKIN: NO RASH  EXTREM: NO EDEMA    Additional Data:     Labs:    Results from last 7 days   Lab Units 11/09/22  0353   WBC Thousand/uL 12 11*   HEMOGLOBIN g/dL 8 1*   HEMATOCRIT % 24 0*   PLATELETS Thousands/uL 158   NEUTROS PCT % 77*   LYMPHS PCT % 13*   MONOS PCT % 8   EOS PCT % 1     Results from last 7 days   Lab Units 11/09/22  0353   SODIUM mmol/L 136   POTASSIUM mmol/L 2 8*   CHLORIDE mmol/L 98   CO2 mmol/L 34*   BUN mg/dL 26*   CREATININE mg/dL 1 22   ANION GAP mmol/L 4   CALCIUM mg/dL 9 9   ALBUMIN g/dL 2 1*   TOTAL BILIRUBIN mg/dL 0 70   ALK PHOS U/L 90   ALT U/L 12   AST U/L 14   GLUCOSE RANDOM mg/dL 207*     Results from last 7 days   Lab Units 11/09/22  0600   INR  1 14     Results from last 7 days   Lab Units 11/09/22  0801 11/08/22  2052 11/08/22  1759 11/08/22  1057 11/08/22  0750 11/07/22  2228 11/07/22  1613 11/07/22  1122 11/07/22  0747 11/06/22  2114 11/06/22  1605 11/06/22  1123   POC GLUCOSE mg/dl 160* 179* 253* 158* 179* 195* 176* 163* 226* 237* 187* 199*     Results from last 7 days   Lab Units 11/02/22  2349   HEMOGLOBIN A1C % 9 1*     Results from last 7 days   Lab Units 11/04/22  0254 11/03/22  0442 11/02/22  2349   LACTIC ACID mmol/L  --   --  1 1   PROCALCITONIN ng/ml 3 26* 10 74* 11 67*           * I Have Reviewed All Lab Data Listed Above             Imaging:     Results for orders placed during the hospital encounter of 11/02/22    XR chest portable    Narrative  CHEST    INDICATION:   Cough, tachycardia, weakness  COMPARISON:  Abdomen CT 11/3/2022, CXR 3/20/2006  EXAM PERFORMED/VIEWS:  XR CHEST PORTABLE      FINDINGS:    Cardiomediastinal silhouette appears unremarkable  2 6 cm solid left lower lobe mass corresponding with the abdomen CT   4 cm cavitary right upper lobe mass  No acute disease  No effusion or pneumothorax  Left lateral 6th rib metastasis corresponding with the abdomen CT probable posterior left 5th rib metastasis  Impression  Right upper lobe cavitary mass, left lower lobe mass, and left rib metastases, some which are visible on the abdomen CT  Findings on the abdomen CT were reported to the emergency physician by Dr Marbella Larkin  Workstation performed: [id number]    No results found for this or any previous visit  *I have reviewed all imaging reports listed above      Recent Cultures (last 7 days):     Results from last 7 days   Lab Units 11/02/22  7469   BLOOD CULTURE  No Growth After 5 Days  No Growth After 5 Days         Last 24 Hours Medication List:   Current Facility-Administered Medications   Medication Dose Route Frequency Provider Last Rate   • acetaminophen  650 mg Oral Q8H PRN Coleen Kelly PA-C     • heparin (porcine)  3-30 Units/kg/hr (Order-Specific) Intravenous Titrated Ritu Alford MD 20 Units/kg/hr (11/08/22 1802)   • influenza vaccine  0 5 mL Intramuscular Prior to discharge Coleen Kelly PA-C     • insulin glargine  8 Units Subcutaneous HS Ritu Alford MD     • insulin lispro  1-5 Units Subcutaneous TID AC Coleen Kelly PA-C     • insulin lispro  1-5 Units Subcutaneous HS Coleen Kelly PA-C     • insulin lispro  3 Units Subcutaneous TID With Meals Coleen Kelly PA-C     • lidocaine  1 patch Topical Daily Ritu Alford MD     • lidocaine  1 patch Topical Daily Ritu Alford MD     • nicotine  1 patch Transdermal Daily Coleen Kelly PA-C     • OLANZapine  5 mg Oral HS Miriam Brink MD     • ondansetron  4 mg Intravenous Q6H PRN Henok Jj PA-C     • oxyCODONE  10 mg Oral Q4H PRN Selene Ortiz MD     • oxyCODONE  5 mg Oral Q6H PRN Selene Ortiz MD     • piperacillin-tazobactam  3 375 g Intravenous Q6H Selene Ortiz MD 3 375 g (11/08/22 1287)   • potassium chloride  20 mEq Intravenous Once Selene Ortiz MD 20 mEq (11/09/22 0744)    Followed by   • potassium chloride  20 mEq Intravenous Once Selene Ortiz MD     • senna  1 tablet Oral HS PRN Henok Jj PA-C     • sodium chloride  80 mL/hr Intravenous Continuous Butch Weiner MD 80 mL/hr (11/08/22 0401)        Today, Patient Was Seen By: Selene Ortiz    ** Please Note: Dictation voice to text software may have been used in the creation of this document   **

## 2022-11-09 NOTE — PLAN OF CARE
Problem: Nutrition/Hydration-ADULT  Goal: Nutrient/Hydration intake appropriate for improving, restoring or maintaining nutritional needs  Description: Monitor and assess patient's nutrition/hydration status for malnutrition  Collaborate with interdisciplinary team and initiate plan and interventions as ordered  Monitor patient's weight and dietary intake as ordered or per policy  Utilize nutrition screening tool and intervene as necessary  Determine patient's food preferences and provide high-protein, high-caloric foods as appropriate       INTERVENTIONS:  - Monitor oral intake, urinary output, labs, and treatment plans  - Assess nutrition and hydration status and recommend course of action  - Evaluate amount of meals eaten  - Assist patient with eating if necessary   - Allow adequate time for meals  - Recommend/ encourage appropriate diets, oral nutritional supplements, and vitamin/mineral supplements  - Order, calculate, and assess calorie counts as needed  - Recommend, monitor, and adjust tube feedings and TPN/PPN based on assessed needs  - Assess need for intravenous fluids  - Provide specific nutrition/hydration education as appropriate  - Include patient/family/caregiver in decisions related to nutrition  Outcome: Progressing     Problem: MOBILITY - ADULT  Goal: Maintain or return to baseline ADL function  Description: INTERVENTIONS:  -  Assess patient's ability to carry out ADLs; assess patient's baseline for ADL function and identify physical deficits which impact ability to perform ADLs (bathing, care of mouth/teeth, toileting, grooming, dressing, etc )  - Assess/evaluate cause of self-care deficits   - Assess range of motion  - Assess patient's mobility; develop plan if impaired  - Assess patient's need for assistive devices and provide as appropriate  - Encourage maximum independence but intervene and supervise when necessary  - Involve family in performance of ADLs  - Assess for home care needs following discharge   - Consider OT consult to assist with ADL evaluation and planning for discharge  - Provide patient education as appropriate  Outcome: Progressing  Goal: Maintains/Returns to pre admission functional level  Description: INTERVENTIONS:  - Perform BMAT or MOVE assessment daily    - Set and communicate daily mobility goal to care team and patient/family/caregiver  - Collaborate with rehabilitation services on mobility goals if consulted  - Perform Range of Motion x times a day  - Reposition patient every x hours    - Dangle patient x times a day  - Stand patient x times a day  - Ambulate patient x times a day  - Out of bed to chair x times a day   - Out of bed for meals x times a day  - Out of bed for toileting  - Record patient progress and toleration of activity level   Outcome: Progressing     Problem: Prexisting or High Potential for Compromised Skin Integrity  Goal: Skin integrity is maintained or improved  Description: INTERVENTIONS:  - Identify patients at risk for skin breakdown  - Assess and monitor skin integrity  - Assess and monitor nutrition and hydration status  - Monitor labs   - Assess for incontinence   - Turn and reposition patient  - Assist with mobility/ambulation  - Relieve pressure over bony prominences  - Avoid friction and shearing  - Provide appropriate hygiene as needed including keeping skin clean and dry  - Evaluate need for skin moisturizer/barrier cream  - Collaborate with interdisciplinary team   - Patient/family teaching  - Consider wound care consult   Outcome: Progressing     Problem: PAIN - ADULT  Goal: Verbalizes/displays adequate comfort level or baseline comfort level  Description: Interventions:  - Encourage patient to monitor pain and request assistance  - Assess pain using appropriate pain scale  - Administer analgesics based on type and severity of pain and evaluate response  - Implement non-pharmacological measures as appropriate and evaluate response  - Consider cultural and social influences on pain and pain management  - Notify physician/advanced practitioner if interventions unsuccessful or patient reports new pain  Outcome: Progressing     Problem: INFECTION - ADULT  Goal: Absence or prevention of progression during hospitalization  Description: INTERVENTIONS:  - Assess and monitor for signs and symptoms of infection  - Monitor lab/diagnostic results  - Monitor all insertion sites, i e  indwelling lines, tubes, and drains  - Monitor endotracheal if appropriate and nasal secretions for changes in amount and color  - [place] appropriate cooling/warming therapies per order  - Administer medications as ordered  - Instruct and encourage patient and family to use good hand hygiene technique  - Identify and instruct in appropriate isolation precautions for identified infection/condition  Outcome: Progressing  Goal: Absence of fever/infection during neutropenic period  Description: INTERVENTIONS:  - Monitor WBC    Outcome: Progressing     Problem: SAFETY ADULT  Goal: Maintain or return to baseline ADL function  Description: INTERVENTIONS:  -  Assess patient's ability to carry out ADLs; assess patient's baseline for ADL function and identify physical deficits which impact ability to perform ADLs (bathing, care of mouth/teeth, toileting, grooming, dressing, etc )  - Assess/evaluate cause of self-care deficits   - Assess range of motion  - Assess patient's mobility; develop plan if impaired  - Assess patient's need for assistive devices and provide as appropriate  - Encourage maximum independence but intervene and supervise when necessary  - Involve family in performance of ADLs  - Assess for home care needs following discharge   - Consider OT consult to assist with ADL evaluation and planning for discharge  - Provide patient education as appropriate  Outcome: Progressing  Goal: Maintains/Returns to pre admission functional level  Description: INTERVENTIONS:  - Perform BMAT or MOVE assessment daily    - Set and communicate daily mobility goal to care team and patient/family/caregiver  - Collaborate with rehabilitation services on mobility goals if consulted  - Perform Range of Motion x times a day  - Reposition patient every x hours    - Dangle patient x times a day  - Stand patient x times a day  - Ambulate patient x times a day  - Out of bed to chair x times a day   - Out of bed for meals x times a day  - Out of bed for toileting  - Record patient progress and toleration of activity level   Outcome: Progressing  Goal: Patient will remain free of falls  Description: INTERVENTIONS:  - Educate patient/family on patient safety including physical limitations  - Instruct patient to call for assistance with activity   - Consult OT/PT to assist with strengthening/mobility   - Keep Call bell within reach  - Keep bed low and locked with side rails adjusted as appropriate  - Keep care items and personal belongings within reach  - Initiate and maintain comfort rounds  - Make Fall Risk Sign visible to staff  - Offer Toileting every x Hours, in advance of need  - Initiate/Maintain xalarm  - Obtain necessary fall risk management equipment: x  - Apply yellow socks and bracelet for high fall risk patients  - Consider moving patient to room near nurses station  Outcome: Progressing     Problem: DISCHARGE PLANNING  Goal: Discharge to home or other facility with appropriate resources  Description: INTERVENTIONS:  - Identify barriers to discharge w/patient and caregiver  - Arrange for needed discharge resources and transportation as appropriate  - Identify discharge learning needs (meds, wound care, etc )  - Arrange for interpretive services to assist at discharge as needed  - Refer to Case Management Department for coordinating discharge planning if the patient needs post-hospital services based on physician/advanced practitioner order or complex needs related to functional status, cognitive ability, or social support system  Outcome: Progressing     Problem: Knowledge Deficit  Goal: Patient/family/caregiver demonstrates understanding of disease process, treatment plan, medications, and discharge instructions  Description: Complete learning assessment and assess knowledge base    Interventions:  - Provide teaching at level of understanding  - Provide teaching via preferred learning methods  Outcome: Progressing     Problem: Potential for Falls  Goal: Patient will remain free of falls  Description: INTERVENTIONS:  - Educate patient/family on patient safety including physical limitations  - Instruct patient to call for assistance with activity   - Consult OT/PT to assist with strengthening/mobility   - Keep Call bell within reach  - Keep bed low and locked with side rails adjusted as appropriate  - Keep care items and personal belongings within reach  - Initiate and maintain comfort rounds  - Make Fall Risk Sign visible to staff  - Offer Toileting every x Hours, in advance of need  - Initiate/Maintain xalarm  - Obtain necessary fall risk management equipment: x  - Apply yellow socks and bracelet for high fall risk patients  - Consider moving patient to room near nurses station  Outcome: Progressing     Problem: CARDIOVASCULAR - ADULT  Goal: Maintains optimal cardiac output and hemodynamic stability  Description: INTERVENTIONS:  - Monitor I/O, vital signs and rhythm  - Monitor for S/S and trends of decreased cardiac output  - Administer and titrate ordered vasoactive medications to optimize hemodynamic stability  - Assess quality of pulses, skin color and temperature  - Assess for signs of decreased coronary artery perfusion  - Instruct patient to report change in severity of symptoms  Outcome: Progressing  Goal: Absence of cardiac dysrhythmias or at baseline rhythm  Description: INTERVENTIONS:  - Continuous cardiac monitoring, vital signs, obtain 12 lead EKG if ordered  - Administer antiarrhythmic and heart rate control medications as ordered  - Monitor electrolytes and administer replacement therapy as ordered  Outcome: Progressing     Problem: RESPIRATORY - ADULT  Goal: Achieves optimal ventilation and oxygenation  Description: INTERVENTIONS:  - Assess for changes in respiratory status  - Assess for changes in mentation and behavior  - Position to facilitate oxygenation and minimize respiratory effort  - Oxygen administered by appropriate delivery if ordered  - Initiate smoking cessation education as indicated  - Encourage broncho-pulmonary hygiene including cough, deep breathe, Incentive Spirometry  - Assess the need for suctioning and aspirate as needed  - Assess and instruct to report SOB or any respiratory difficulty  - Respiratory Therapy support as indicated  Outcome: Progressing     Problem: METABOLIC, FLUID AND ELECTROLYTES - ADULT  Goal: Electrolytes maintained within normal limits  Description: INTERVENTIONS:  - Monitor labs and assess patient for signs and symptoms of electrolyte imbalances  - Administer electrolyte replacement as ordered  - Monitor response to electrolyte replacements, including repeat lab results as appropriate  - Instruct patient on fluid and nutrition as appropriate  Outcome: Progressing  Goal: Fluid balance maintained  Description: INTERVENTIONS:  - Monitor labs   - Monitor I/O and WT  - Instruct patient on fluid and nutrition as appropriate  - Assess for signs & symptoms of volume excess or deficit  Outcome: Progressing  Goal: Glucose maintained within target range  Description: INTERVENTIONS:  - Monitor Blood Glucose as ordered  - Assess for signs and symptoms of hyperglycemia and hypoglycemia  - Administer ordered medications to maintain glucose within target range  - Assess nutritional intake and initiate nutrition service referral as needed  Outcome: Progressing     Problem: SKIN/TISSUE INTEGRITY - ADULT  Goal: Skin Integrity remains intact(Skin Breakdown Prevention)  Description: Assess:  -Perform Ashish assessment every x  -Clean and moisturize skin every xxxx  -Inspect skin when repositioning, toileting, and assisting with ADLS  -Assess under medical devices such as x every x  -Assess extremities for adequate circulation and sensation     Bed Management:  -Have minimal linens on bed & keep smooth, unwrinkled  -Change linens as needed when moist or perspiring  -Avoid sitting or lying in one position for more than x hours while in bed  -Keep HOB at [facility]     Toileting:  -Offer bedside commode  -Assess for incontinence every x  -Use incontinent care products after each incontinent episode such as x    Activity:  -Mobilize patient x times a day  -Encourage activity and walks on unit  -Encourage or provide ROM exercises   -Turn and reposition patient every x Hours  -Use appropriate equipment to lift or move patient in bed  -Instruct/ Assist with weight shifting every x when out of bed in chair  -Consider limitation of chair time x hour intervals    Skin Care:  -Avoid use of baby powder, tape, friction and shearing, hot water or constrictive clothing  -Relieve pressure over bony prominences using x  -Do not massage red bony areas    Next Steps:  -Teach patient strategies to minimize risks such as x   -Consider consults to  interdisciplinary teams such as x  Outcome: Progressing     Problem: HEMATOLOGIC - ADULT  Goal: Maintains hematologic stability  Description: INTERVENTIONS  - Assess for signs and symptoms of bleeding or hemorrhage  - Monitor labs  - Administer supportive blood products/factors as ordered and appropriate  Outcome: Progressing     Problem: MUSCULOSKELETAL - ADULT  Goal: Maintain or return mobility to safest level of function  Description: INTERVENTIONS:  - Assess patient's ability to carry out ADLs; assess patient's baseline for ADL function and identify physical deficits which impact ability to perform ADLs (bathing, care of mouth/teeth, toileting, grooming, dressing, etc )  - Assess/evaluate cause of self-care deficits   - Assess range of motion  - Assess patient's mobility  - Assess patient's need for assistive devices and provide as appropriate  - Encourage maximum independence but intervene and supervise when necessary  - Involve family in performance of ADLs  - Assess for home care needs following discharge   - Consider OT consult to assist with ADL evaluation and planning for discharge  - Provide patient education as appropriate  Outcome: Progressing

## 2022-11-09 NOTE — PROGRESS NOTES
NEPHROLOGY PROGRESS NOTE   Mario Sampson 58 y o  male MRN: [id number]  Unit/Bed#: -01 Encounter: [id number]      HPI/24hr EVENTS:    -20-year-old male past medical history of schizophrenia, presented with generalized weakness/fatigue/ambulatory dysfunction, unintentional weight loss of 25 lb over 2 months  Found to have pulmonary embolism, DVT, new lung mass concerning for malignancy with possible metastases    Nephrology was consulted for management of hypercalcemia    -underwent IR biopsy of bone lesion by IR, no drainable collection safer thoracentesis not performed, calcium level decreasing    ASSESSMENT/PLAN:    Hypercalcemia  -corrected calcium on admission was 11 5, received IV fluid with downtrending to a yojana of 10 3 on 11/03, now improved to 11 4 when corrected  -PTH 9 8, vitamin-D 25-hydroxy 20 7  -MRI lumbar spine with scattered enhancing osseous metastases throughout the lumbar spine  -CTA chest PE study with metastatic disease of bilateral ribs, 3 6 cm thick-walled cavitary right upper lobe tumor, 2 8 cm necrotic left lobe tumor  -suspect hypercalcemia related to underlying malignancy, patient does report he takes oral calcium and vitamin-D supplements at home, hold further vitamin-D and calcium supplements while inpatient  -status post Zometa 4 mg IV on 11/07  -would continue IV fluid for another day, currently 0 9% saline 80ml/hr      Lung mass  -CTA as noted above  -status post IR biopsy 11/8  -palliative/Hematology/Oncology/pulmonology consulted     Renal function  -unknown baseline  -creatinine on admission is 0 78, most recently 1 22    Blood pressure  -Recent blood pressures trends are acceptable  -not on any antihypertensives currently     Acute hypoxic respiratory failure  -prior requiring 2 L nasal cannula, currently on room air  -pulmonology consulted  -IR consult for possible thoracentesis, no drainable collection noted not performed     Anemia  -Hgb 8 1  -Recommend transfuse for goal greater than 7 per primary team  -hematology/oncology consult  -iron panel from 11/03:  Ferritin greater than 1000, iron sat 43     Lower extremity edema  -has bilateral lower extremity DVT on Doppler  -currently on heparin infusion     Pulmonary embolism  -CTA PE study with few small subsegmental emboli in the right middle lobe right lower lobe and inferior lingula  -currently on heparin infusion per primary team     Elevated serum bicarbonate  -suspect 2nd to volume contraction  -downtrending after IV fluid     SIRS  -meeting SIRS criteria on admission unclear source  -currently on Zosyn     Advanced care planning  -evaluated by neuropsych, deemed to not have capacity to make fully informed medical decisions     Additional Medical Problems:  Schizophrenia    SUBJECTIVE:  No complaints at this time, reports he was able to eat and drink last night after his procedure, reports he tolerated his procedure well    ROS:  Review of Systems   Constitutional: Negative  HENT: Negative  Respiratory: Positive for cough  Cardiovascular: Negative  Gastrointestinal: Negative  Genitourinary: Negative  A complete 10 point review of systems was performed and found to be negative unless otherwise noted above or in the HPI  OBJECTIVE:  Current Weight: Weight - Scale: 72 6 kg (160 lb)  Vitals:    11/08/22 1551 11/08/22 2054 11/08/22 2300 11/09/22 0753   BP: 163/98 156/98  123/72   BP Location:  Right arm  Right arm   Pulse:  (!) 117  103   Resp:  20  19   Temp:  98 2 °F (36 8 °C)  98 3 °F (36 8 °C)   TempSrc:  Oral  Oral   SpO2:  97% 97% 94%   Weight:       Height:           Intake/Output Summary (Last 24 hours) at 11/9/2022 1115  Last data filed at 11/9/2022 1042  Gross per 24 hour   Intake 360 ml   Output 1225 ml   Net -865 ml     Physical Exam  Vitals and nursing note reviewed  Constitutional:       General: He is not in acute distress  Appearance: He is underweight   He is not toxic-appearing or diaphoretic  Comments: Sleeping in bed, awakens to verbal stimuli   HENT:      Head: Normocephalic and atraumatic  Nose: Nose normal       Mouth/Throat:      Mouth: Mucous membranes are dry  Eyes:      General: No scleral icterus  Cardiovascular:      Rate and Rhythm: Normal rate and regular rhythm  Pulses: Normal pulses  Heart sounds: Normal heart sounds  Pulmonary:      Effort: Pulmonary effort is normal  No respiratory distress  Breath sounds: Normal breath sounds  No stridor  No wheezing or rales  Abdominal:      General: Abdomen is flat  There is no distension  Palpations: Abdomen is soft  Musculoskeletal:      Cervical back: Neck supple  Right lower leg: No edema  Left lower leg: No edema  Skin:     General: Skin is warm and dry  Capillary Refill: Capillary refill takes less than 2 seconds  Neurological:      General: No focal deficit present  Mental Status: He is alert            Medications:    Current Facility-Administered Medications:   •  acetaminophen (TYLENOL) tablet 650 mg, 650 mg, Oral, Q8H PRN, Ramiro Phelps PA-C, 650 mg at 11/08/22 2040  •  heparin (porcine) 25,000 units in 0 45% NaCl 250 mL infusion (premix), 3-30 Units/kg/hr (Order-Specific), Intravenous, Titrated, Glenford Brunner, MD, Last Rate: 14 mL/hr at 11/08/22 1802, 20 Units/kg/hr at 11/08/22 1802  •  influenza vaccine, recombinant, quadrivalent (FLUBLOK) IM injection 0 5 mL, 0 5 mL, Intramuscular, Prior to discharge, Ramiro Phelps PA-C  •  insulin glargine (LANTUS) subcutaneous injection 8 Units 0 08 mL, 8 Units, Subcutaneous, HS, Glenford Brunner, MD, 8 Units at 11/08/22 2301  •  insulin lispro (HumaLOG) 100 units/mL subcutaneous injection 1-5 Units, 1-5 Units, Subcutaneous, TID AC, 1 Units at 11/09/22 0801 **AND** Fingerstick Glucose (POCT), , , TID AC, Ramiro Phelps PA-C  •  insulin lispro (HumaLOG) 100 units/mL subcutaneous injection 1-5 Units, 1-5 Units, Subcutaneous, HS, Tristan Chong PA-C, 1 Units at 11/08/22 2300  •  insulin lispro (HumaLOG) 100 units/mL subcutaneous injection 3 Units, 3 Units, Subcutaneous, TID With Meals, Tristan Chong PA-C, 3 Units at 11/09/22 0801  •  lidocaine (LIDODERM) 5 % patch 1 patch, 1 patch, Topical, Daily, Shaylee Torres MD, 1 patch at 11/08/22 0823  •  lidocaine (LIDODERM) 5 % patch 1 patch, 1 patch, Topical, Daily, Shaylee Torres MD, 1 patch at 11/08/22 2803  •  nicotine (NICODERM CQ) 21 mg/24 hr TD 24 hr patch 1 patch, 1 patch, Transdermal, Daily, Tristan Chong PA-C, 1 patch at 11/09/22 0758  •  OLANZapine (ZyPREXA) tablet 5 mg, 5 mg, Oral, HS, Shaylee Torres MD, 5 mg at 11/08/22 2301  •  ondansetron (ZOFRAN) injection 4 mg, 4 mg, Intravenous, Q6H PRN, Tristan Chong PA-C  •  oxyCODONE (ROXICODONE) IR tablet 10 mg, 10 mg, Oral, Q4H PRN, Willem Hernandez MD, 10 mg at 11/09/22 0756  •  oxyCODONE (ROXICODONE) IR tablet 5 mg, 5 mg, Oral, Q6H PRN, Willem Hernandez MD  •  piperacillin-tazobactam (ZOSYN) IVPB 3 375 g, 3 375 g, Intravenous, Q6H, Willem Hernandez MD, Last Rate: 200 mL/hr at 11/08/22 1808, 3 375 g at 11/09/22 0602  •  [COMPLETED] potassium chloride 20 mEq IVPB (premix), 20 mEq, Intravenous, Once, Last Rate: 50 mL/hr at 11/09/22 0744, 20 mEq at 11/09/22 0744 **FOLLOWED BY** potassium chloride 20 mEq IVPB (premix), 20 mEq, Intravenous, Once, Willem Hernandez MD, Last Rate: 50 mL/hr at 11/09/22 0931, 20 mEq at 11/09/22 0931  •  senna (SENOKOT) tablet 8 6 mg, 1 tablet, Oral, HS PRN, Tristan Chong PA-C  •  sodium chloride 0 9 % infusion, 80 mL/hr, Intravenous, Continuous, Tomasa Hanson MD, Last Rate: 80 mL/hr at 11/08/22 0401, 80 mL/hr at 11/08/22 0401    Laboratory Results:  Results from last 7 days   Lab Units 11/09/22  0353 11/08/22  0522 11/07/22  0602 11/06/22  0424 11/05/22  1305 11/04/22  0254 11/03/22  1234 11/03/22  0442   WBC Thousand/uL 12 11* 14 63* 15 59* 14 39* 13 54* 17 51* 20 09* 15 50*   HEMOGLOBIN g/dL 8 1* 8 0* 8 5* 8 1* 8 9* 8 9* 9 5* 9 2*   HEMATOCRIT % 24 0* 24 4* 25 1* 24 0* 26 3* 26 9* 28 0* 27 1*   PLATELETS Thousands/uL 158 184 214 196 213 195 201 180   POTASSIUM mmol/L 2 8* 3 6 3 2* 3 1* 3 1* 3 7  --  3 2*   CHLORIDE mmol/L 98 100 98 99 96 99  --  97   CO2 mmol/L 34* 35* 33* 35* 36* 37*  --  32   BUN mg/dL 26* 21 13 10 11 14  --  19   CREATININE mg/dL 1 22 1 17 0 83 0 70 0 75 0 67  --  0 71   CALCIUM mg/dL 9 9 11 6* 11 1* 9 7 9 5 9 0  --  8 8   MAGNESIUM mg/dL  --   --  1 5*  --  1 6 1 8  --  1 5*   PHOSPHORUS mg/dL  --   --   --   --   --  3 2  --  3 3       I have personally reviewed the blood work as stated above and in my note  I have personally reviewed internal medicine and palliative care and IR note

## 2022-11-09 NOTE — PROGRESS NOTES
Progress Note - Palliative & Supportive Care  Dorina Snellen  58 y o   male  /-01   MRN: [id number]  Encounter: [id number]     Assessment/Problems Actively Addressed this Visit:  Suspected Lung cancer metastatic to bone  Hilar metastasis  Retropulsion of bone into spinal canal, MRI pending  Pulmonary emboli  Acute DVT   Rule out acute cholecystitis  Hypercalcemia  Hyponatremia   schizophrenia    Goals/Recommendations:  · Level 1 code status  · Biopsy obtained and results are pending  · Dayna Adhikari is not competent to make medical decisions  Chloeadriana Lane is surrogate decision-maker  · I called Vannessa Riley to discuss next steps, awaiting callback  PT recommending STR which Lita Foster is reluctantly agreeable to  · Lita Foster can follow up with palliative care as an outpatient to further discuss goals  · Palliative care will sign off  Can contact on-call provider if situation changes  Plan:  Symptom Management  -Managed by primary  -Unless Randal Duffy has closemsupervision, he is not a good candidate for controlled substance symptomatic management at discharge  24 History  Chart reviewed before visit  Patient underwent biopsy 11/8  There was not sufficient fluid to perform thoracentesis and this is not done  Today, Londonyusuf Foster appears comfortable  He is lying in the bed on room air  He has poor insight into his medical condition  Decisional apparatus:  Patient is not competent on exam today  If competence is lost, patient's substitute decision maker would default to Vannessa Riley by [place]  Advance Directive/Living Will: none    Review of Systems:   Limited ROS due to psychiatric condition  Review of Systems   HENT: Negative for hoarse voice  Cardiovascular: Negative for dyspnea on exertion and irregular heartbeat  Respiratory: Negative for cough, shortness of breath and sleep disturbances due to breathing  Musculoskeletal: Positive for back pain and muscle weakness     Neurological: Positive for weakness  Negative for excessive daytime sleepiness         Medications    Current Facility-Administered Medications:   •  acetaminophen (TYLENOL) tablet 650 mg, 650 mg, Oral, Q8H PRN, Vero Malone PA-C, 650 mg at 11/08/22 2040  •  heparin (porcine) 25,000 units in 0 45% NaCl 250 mL infusion (premix), 3-30 Units/kg/hr (Order-Specific), Intravenous, Titrated, Joni Fischer MD, Last Rate: 14 mL/hr at 11/08/22 1802, 20 Units/kg/hr at 11/08/22 1802  •  influenza vaccine, recombinant, quadrivalent (FLUBLOK) IM injection 0 5 mL, 0 5 mL, Intramuscular, Prior to discharge, Vero Malone PA-C  •  insulin glargine (LANTUS) subcutaneous injection 8 Units 0 08 mL, 8 Units, Subcutaneous, HS, Haleigh Stapleton MD, 8 Units at 11/08/22 2301  •  insulin lispro (HumaLOG) 100 units/mL subcutaneous injection 1-5 Units, 1-5 Units, Subcutaneous, TID AC, 1 Units at 11/09/22 0801 **AND** Fingerstick Glucose (POCT), , , TID AC, Vero Malone PA-C  •  insulin lispro (HumaLOG) 100 units/mL subcutaneous injection 1-5 Units, 1-5 Units, Subcutaneous, HS, Vero Malone PA-C, 1 Units at 11/08/22 2300  •  insulin lispro (HumaLOG) 100 units/mL subcutaneous injection 3 Units, 3 Units, Subcutaneous, TID With Meals, Vero Malone PA-C, 3 Units at 11/09/22 0801  •  lidocaine (LIDODERM) 5 % patch 1 patch, 1 patch, Topical, Daily, Joni Fischer MD, 1 patch at 11/08/22 0823  •  lidocaine (LIDODERM) 5 % patch 1 patch, 1 patch, Topical, Daily, Joni Fischer MD, 1 patch at 11/08/22 1566  •  nicotine (NICODERM CQ) 21 mg/24 hr TD 24 hr patch 1 patch, 1 patch, Transdermal, Daily, Vero Malone PA-C, 1 patch at 11/09/22 0758  •  OLANZapine (ZyPREXA) tablet 5 mg, 5 mg, Oral, HS, Joni Fischer MD, 5 mg at 11/08/22 4674  •  ondansetron (ZOFRAN) injection 4 mg, 4 mg, Intravenous, Q6H PRN, Vero Malone PA-C  •  oxyCODONE (ROXICODONE) IR tablet 10 mg, 10 mg, Oral, Q4H PRN, Judith Dick MD, 10 mg at 11/09/22 0756  •  oxyCODONE (ROXICODONE) IR tablet 5 mg, 5 mg, Oral, Q6H PRN, Judith Dick MD  •  piperacillin-tazobactam (ZOSYN) IVPB 3 375 g, 3 375 g, Intravenous, Q6H, Shell Hawkins MD, Last Rate: 200 mL/hr at 11/08/22 1808, 3 375 g at 11/09/22 0602  •  potassium chloride 20 mEq IVPB (premix), 20 mEq, Intravenous, Once, Last Rate: 50 mL/hr at 11/09/22 0744, 20 mEq at 11/09/22 0744 **FOLLOWED BY** potassium chloride 20 mEq IVPB (premix), 20 mEq, Intravenous, Once, Judith Dick MD  •  senna (SENOKOT) tablet 8 6 mg, 1 tablet, Oral, HS PRN, Albert Sagastume PA-C  •  sodium chloride 0 9 % infusion, 80 mL/hr, Intravenous, Continuous, Francisco Jones MD, Last Rate: 80 mL/hr at 11/08/22 0401, 80 mL/hr at 11/08/22 0401    Objective  /72 (BP Location: Right arm)   Pulse 103   Temp 98 3 °F (36 8 °C) (Oral)   Resp 19   Ht 5' 9" (1 753 m)   Wt 72 6 kg (160 lb)   SpO2 94%   BMI 23 63 kg/m²     Physical Exam:   Physical Exam  Vitals and nursing note reviewed  Exam conducted with a chaperone present  Constitutional:       Appearance: He is well-developed  He is ill-appearing  HENT:      Head: Normocephalic and atraumatic  Mouth/Throat:      Dentition: Abnormal dentition  Eyes:      Conjunctiva/sclera: Conjunctivae normal    Cardiovascular:      Rate and Rhythm: Normal rate and regular rhythm  Heart sounds: No murmur heard  Pulmonary:      Effort: Pulmonary effort is normal  No respiratory distress  Abdominal:      Tenderness: There is no abdominal tenderness  Musculoskeletal:      Cervical back: Neck supple  Comments: Wasted muscles   Skin:     General: Skin is warm and dry  Neurological:      Mental Status: He is alert  Psychiatric:         Speech: Speech is tangential          Cognition and Memory: Cognition is impaired  Lab Results:   I have personally reviewed pertinent labs  , CBC:   Lab Results Component Value Date    WBC 12 11 (H) 11/09/2022    HGB 8 1 (L) 11/09/2022    HCT 24 0 (L) 11/09/2022    MCV 95 11/09/2022     11/09/2022    MCH 32 0 11/09/2022    MCHC 33 8 11/09/2022    RDW 16 2 (H) 11/09/2022    MPV 10 8 11/09/2022    NRBC 0 11/09/2022   , CMP:   Lab Results   Component Value Date    SODIUM 136 11/09/2022    K 2 8 (L) 11/09/2022    CL 98 11/09/2022    CO2 34 (H) 11/09/2022    BUN 26 (H) 11/09/2022    CREATININE 1 22 11/09/2022    CALCIUM 9 9 11/09/2022    AST 14 11/09/2022    ALT 12 11/09/2022    ALKPHOS 90 11/09/2022    EGFR 63 11/09/2022     Imaging Studies: I have personally reviewed pertinent reports  EKG, Pathology, and Other Studies: I have personally reviewed pertinent reports  Counseling / Coordination of Care  Total floor / unit time spent today 35 minutes  Greater than 50% of total time was spent with the patient and / or family counseling and / or coordinating of care  A description of the counseling / coordination of care: Chart reviewed,  provided medical updates, determined goals of care, discussed palliative care and symptom management, discussed code status, discussed comfort care and hospice, determined competency and POA/HCA, determined social/family support, provided psychosocial support  Reviewed with ANTHONY team, RN and BELL Salazar  Palliative & Supportive Care    Portions of this document may have been created using dictation software and as such some "sound alike" terms may have been generated by the system  Do not hesitate to contact me with any questions or clarifications

## 2022-11-09 NOTE — ASSESSMENT & PLAN NOTE
· Sirs criteria met on admission:  Tachycardia and leukocytosis at 17 36 K without bandemia   · No severe sepsis criteria met   · Suspect reactive from suspected metastatic lung cancer as seen on CT scan   · UA without infection   · Procalcitonin elevated at 11 67-> 3    · ? Elevation secondary to infection or possible small cell lung cancer  · CT scan does show distended gallbladder and with elevated T bili and alk-phos, received IV Zosyn to cover for possible infection  Surgery suggests no acute surgical intervention  · Blood cultures x2, neg  · Dc zosyn 11/7 and observe  Pt had hypothermia/fevers  · Reinstate zosyn  Repeat blood cultures pending

## 2022-11-09 NOTE — ASSESSMENT & PLAN NOTE
· PTH low, hypercalcemia of malignancy is etiology   · apprec nephrology assistance  · S/p IVF, lasix, zometa  Slowly improving

## 2022-11-09 NOTE — ASSESSMENT & PLAN NOTE
· Presents with generalized weakness, fatigue, and weight loss  · CT a/P: " 2 5 x 2 2 x 2 2 cm mass in the left lower lobe concerning for lung cancer  Lytic lesions within the left 6th rib and pelvis bones compatible with osseous metastasis "  · Longstanding smoking history  · Will ask pulm to evaluate  · Heme-Onc recommended tissue diagnosis with complete staging    They recommended patient will need anticoagulation which will be lifelong based on probable stage IV malignancy causing hypercoagulable state  · IR tissue biopsy performed - 11/8  · Palliative care is on board

## 2022-11-09 NOTE — PLAN OF CARE
Problem: Nutrition/Hydration-ADULT  Goal: Nutrient/Hydration intake appropriate for improving, restoring or maintaining nutritional needs  Description: Monitor and assess patient's nutrition/hydration status for malnutrition  Collaborate with interdisciplinary team and initiate plan and interventions as ordered  Monitor patient's weight and dietary intake as ordered or per policy  Utilize nutrition screening tool and intervene as necessary  Determine patient's food preferences and provide high-protein, high-caloric foods as appropriate       INTERVENTIONS:  - Monitor oral intake, urinary output, labs, and treatment plans  - Assess nutrition and hydration status and recommend course of action  - Evaluate amount of meals eaten  - Assist patient with eating if necessary   - Allow adequate time for meals  - Recommend/ encourage appropriate diets, oral nutritional supplements, and vitamin/mineral supplements  - Order, calculate, and assess calorie counts as needed  - Recommend, monitor, and adjust tube feedings and TPN/PPN based on assessed needs  - Assess need for intravenous fluids  - Provide specific nutrition/hydration education as appropriate  - Include patient/family/caregiver in decisions related to nutrition  Outcome: Progressing     Problem: MOBILITY - ADULT  Goal: Maintain or return to baseline ADL function  Description: INTERVENTIONS:  -  Assess patient's ability to carry out ADLs; assess patient's baseline for ADL function and identify physical deficits which impact ability to perform ADLs (bathing, care of mouth/teeth, toileting, grooming, dressing, etc )  - Assess/evaluate cause of self-care deficits   - Assess range of motion  - Assess patient's mobility; develop plan if impaired  - Assess patient's need for assistive devices and provide as appropriate  - Encourage maximum independence but intervene and supervise when necessary  - Involve family in performance of ADLs  - Assess for home care needs following discharge   - Consider OT consult to assist with ADL evaluation and planning for discharge  - Provide patient education as appropriate  Outcome: Progressing  Goal: Maintains/Returns to pre admission functional level  Description: INTERVENTIONS:  - Perform BMAT or MOVE assessment daily    - Set and communicate daily mobility goal to care team and patient/family/caregiver  - Collaborate with rehabilitation services on mobility goals if consulted  - Perform Range of Motion  times a day  - Reposition patient every  hours    - Dangle patient  times a day  - Stand patient  times a day  - Ambulate patient  times a day  - Out of bed to chair  times a day   - Out of bed for meals  times a day  - Out of bed for toileting  - Record patient progress and toleration of activity level   Outcome: Progressing     Problem: Prexisting or High Potential for Compromised Skin Integrity  Goal: Skin integrity is maintained or improved  Description: INTERVENTIONS:  - Identify patients at risk for skin breakdown  - Assess and monitor skin integrity  - Assess and monitor nutrition and hydration status  - Monitor labs   - Assess for incontinence   - Turn and reposition patient  - Assist with mobility/ambulation  - Relieve pressure over bony prominences  - Avoid friction and shearing  - Provide appropriate hygiene as needed including keeping skin clean and dry  - Evaluate need for skin moisturizer/barrier cream  - Collaborate with interdisciplinary team   - Patient/family teaching  - Consider wound care consult   Outcome: Progressing     Problem: PAIN - ADULT  Goal: Verbalizes/displays adequate comfort level or baseline comfort level  Description: Interventions:  - Encourage patient to monitor pain and request assistance  - Assess pain using appropriate pain scale  - Administer analgesics based on type and severity of pain and evaluate response  - Implement non-pharmacological measures as appropriate and evaluate response  - Consider cultural and social influences on pain and pain management  - Notify physician/advanced practitioner if interventions unsuccessful or patient reports new pain  Outcome: Progressing     Problem: INFECTION - ADULT  Goal: Absence or prevention of progression during hospitalization  Description: INTERVENTIONS:  - Assess and monitor for signs and symptoms of infection  - Monitor lab/diagnostic results  - Monitor all insertion sites, i e  indwelling lines, tubes, and drains  - Monitor endotracheal if appropriate and nasal secretions for changes in amount and color  - [place] appropriate cooling/warming therapies per order  - Administer medications as ordered  - Instruct and encourage patient and family to use good hand hygiene technique  - Identify and instruct in appropriate isolation precautions for identified infection/condition  Outcome: Progressing  Goal: Absence of fever/infection during neutropenic period  Description: INTERVENTIONS:  - Monitor WBC    Outcome: Progressing     Problem: SAFETY ADULT  Goal: Maintain or return to baseline ADL function  Description: INTERVENTIONS:  -  Assess patient's ability to carry out ADLs; assess patient's baseline for ADL function and identify physical deficits which impact ability to perform ADLs (bathing, care of mouth/teeth, toileting, grooming, dressing, etc )  - Assess/evaluate cause of self-care deficits   - Assess range of motion  - Assess patient's mobility; develop plan if impaired  - Assess patient's need for assistive devices and provide as appropriate  - Encourage maximum independence but intervene and supervise when necessary  - Involve family in performance of ADLs  - Assess for home care needs following discharge   - Consider OT consult to assist with ADL evaluation and planning for discharge  - Provide patient education as appropriate  Outcome: Progressing  Goal: Maintains/Returns to pre admission functional level  Description: INTERVENTIONS:  - Perform BMAT or MOVE assessment daily    - Set and communicate daily mobility goal to care team and patient/family/caregiver  - Collaborate with rehabilitation services on mobility goals if consulted  - Perform Range of Motion  times a day  - Reposition patient every  hours    - Dangle patient  times a day  - Stand patient  times a day  - Ambulate patient  times a day  - Out of bed to chair  times a day   - Out of bed for meals  times a day  - Out of bed for toileting  - Record patient progress and toleration of activity level   Outcome: Progressing  Goal: Patient will remain free of falls  Description: INTERVENTIONS:  - Educate patient/family on patient safety including physical limitations  - Instruct patient to call for assistance with activity   - Consult OT/PT to assist with strengthening/mobility   - Keep Call bell within reach  - Keep bed low and locked with side rails adjusted as appropriate  - Keep care items and personal belongings within reach  - Initiate and maintain comfort rounds  - Make Fall Risk Sign visible to staff  - Offer Toileting every  Hours, in advance of need  - Initiate/Maintain alarm  - Obtain necessary fall risk management equipment:  - Apply yellow socks and bracelet for high fall risk patients  - Consider moving patient to room near nurses station  Outcome: Progressing     Problem: DISCHARGE PLANNING  Goal: Discharge to home or other facility with appropriate resources  Description: INTERVENTIONS:  - Identify barriers to discharge w/patient and caregiver  - Arrange for needed discharge resources and transportation as appropriate  - Identify discharge learning needs (meds, wound care, etc )  - Arrange for interpretive services to assist at discharge as needed  - Refer to Case Management Department for coordinating discharge planning if the patient needs post-hospital services based on physician/advanced practitioner order or complex needs related to functional status, cognitive ability, or social support system  Outcome: Progressing     Problem: Knowledge Deficit  Goal: Patient/family/caregiver demonstrates understanding of disease process, treatment plan, medications, and discharge instructions  Description: Complete learning assessment and assess knowledge base    Interventions:  - Provide teaching at level of understanding  - Provide teaching via preferred learning methods  Outcome: Progressing     Problem: Potential for Falls  Goal: Patient will remain free of falls  Description: INTERVENTIONS:  - Educate patient/family on patient safety including physical limitations  - Instruct patient to call for assistance with activity   - Consult OT/PT to assist with strengthening/mobility   - Keep Call bell within reach  - Keep bed low and locked with side rails adjusted as appropriate  - Keep care items and personal belongings within reach  - Initiate and maintain comfort rounds  - Make Fall Risk Sign visible to staff  - Offer Toileting every  Hours, in advance of need  - Initiate/Maintain alarm  - Obtain necessary fall risk management equipment:   - Apply yellow socks and bracelet for high fall risk patients  - Consider moving patient to room near nurses station  Outcome: Progressing

## 2022-11-10 PROBLEM — D64.9 ANEMIA: Status: ACTIVE | Noted: 2022-11-10

## 2022-11-10 LAB
ABO GROUP BLD: NORMAL
ABO GROUP BLD: NORMAL
ALBUMIN SERPL BCP-MCNC: 1.9 G/DL (ref 3.5–5)
ALP SERPL-CCNC: 84 U/L (ref 46–116)
ALT SERPL W P-5'-P-CCNC: 12 U/L (ref 12–78)
ANION GAP SERPL CALCULATED.3IONS-SCNC: 6 MMOL/L (ref 4–13)
APTT PPP: 58 SECONDS (ref 23–37)
APTT PPP: 59 SECONDS (ref 23–37)
APTT PPP: 66 SECONDS (ref 23–37)
AST SERPL W P-5'-P-CCNC: 15 U/L (ref 5–45)
BASOPHILS # BLD AUTO: 0.02 THOUSANDS/ÂΜL (ref 0–0.1)
BASOPHILS # BLD AUTO: 0.02 THOUSANDS/ÂΜL (ref 0–0.1)
BASOPHILS NFR BLD AUTO: 0 % (ref 0–1)
BASOPHILS NFR BLD AUTO: 0 % (ref 0–1)
BILIRUB SERPL-MCNC: 0.5 MG/DL (ref 0.2–1)
BLD GP AB SCN SERPL QL: NEGATIVE
BUN SERPL-MCNC: 24 MG/DL (ref 5–25)
CALCIUM ALBUM COR SERPL-MCNC: 10.9 MG/DL (ref 8.3–10.1)
CALCIUM SERPL-MCNC: 9.2 MG/DL (ref 8.3–10.1)
CHLORIDE SERPL-SCNC: 101 MMOL/L (ref 96–108)
CO2 SERPL-SCNC: 32 MMOL/L (ref 21–32)
CREAT SERPL-MCNC: 1.14 MG/DL (ref 0.6–1.3)
EOSINOPHIL # BLD AUTO: 0.04 THOUSAND/ÂΜL (ref 0–0.61)
EOSINOPHIL # BLD AUTO: 0.08 THOUSAND/ÂΜL (ref 0–0.61)
EOSINOPHIL NFR BLD AUTO: 0 % (ref 0–6)
EOSINOPHIL NFR BLD AUTO: 1 % (ref 0–6)
ERYTHROCYTE [DISTWIDTH] IN BLOOD BY AUTOMATED COUNT: 16.4 % (ref 11.6–15.1)
ERYTHROCYTE [DISTWIDTH] IN BLOOD BY AUTOMATED COUNT: 16.5 % (ref 11.6–15.1)
GFR SERPL CREATININE-BSD FRML MDRD: 68 ML/MIN/1.73SQ M
GLUCOSE SERPL-MCNC: 103 MG/DL (ref 65–140)
GLUCOSE SERPL-MCNC: 109 MG/DL (ref 65–140)
GLUCOSE SERPL-MCNC: 138 MG/DL (ref 65–140)
GLUCOSE SERPL-MCNC: 164 MG/DL (ref 65–140)
GLUCOSE SERPL-MCNC: 98 MG/DL (ref 65–140)
HCT VFR BLD AUTO: 19.4 % (ref 36.5–49.3)
HCT VFR BLD AUTO: 21.9 % (ref 36.5–49.3)
HGB BLD-MCNC: 6.3 G/DL (ref 12–17)
HGB BLD-MCNC: 7.2 G/DL (ref 12–17)
IMM GRANULOCYTES # BLD AUTO: 0.07 THOUSAND/UL (ref 0–0.2)
IMM GRANULOCYTES # BLD AUTO: 0.08 THOUSAND/UL (ref 0–0.2)
IMM GRANULOCYTES NFR BLD AUTO: 1 % (ref 0–2)
IMM GRANULOCYTES NFR BLD AUTO: 1 % (ref 0–2)
INR PPP: 1.11 (ref 0.84–1.19)
LYMPHOCYTES # BLD AUTO: 1.5 THOUSANDS/ÂΜL (ref 0.6–4.47)
LYMPHOCYTES # BLD AUTO: 1.71 THOUSANDS/ÂΜL (ref 0.6–4.47)
LYMPHOCYTES NFR BLD AUTO: 13 % (ref 14–44)
LYMPHOCYTES NFR BLD AUTO: 14 % (ref 14–44)
MCH RBC QN AUTO: 31.3 PG (ref 26.8–34.3)
MCH RBC QN AUTO: 31.3 PG (ref 26.8–34.3)
MCHC RBC AUTO-ENTMCNC: 32 G/DL (ref 31.4–37.4)
MCHC RBC AUTO-ENTMCNC: 32.9 G/DL (ref 31.4–37.4)
MCV RBC AUTO: 95 FL (ref 82–98)
MCV RBC AUTO: 98 FL (ref 82–98)
MONOCYTES # BLD AUTO: 0.99 THOUSAND/ÂΜL (ref 0.17–1.22)
MONOCYTES # BLD AUTO: 1.33 THOUSAND/ÂΜL (ref 0.17–1.22)
MONOCYTES NFR BLD AUTO: 10 % (ref 4–12)
MONOCYTES NFR BLD AUTO: 9 % (ref 4–12)
NEUTROPHILS # BLD AUTO: 10.13 THOUSANDS/ÂΜL (ref 1.85–7.62)
NEUTROPHILS # BLD AUTO: 8.39 THOUSANDS/ÂΜL (ref 1.85–7.62)
NEUTS SEG NFR BLD AUTO: 75 % (ref 43–75)
NEUTS SEG NFR BLD AUTO: 76 % (ref 43–75)
NRBC BLD AUTO-RTO: 0 /100 WBCS
NRBC BLD AUTO-RTO: 0 /100 WBCS
PLATELET # BLD AUTO: 123 THOUSANDS/UL (ref 149–390)
PLATELET # BLD AUTO: 135 THOUSANDS/UL (ref 149–390)
PMV BLD AUTO: 10.3 FL (ref 8.9–12.7)
PMV BLD AUTO: 10.7 FL (ref 8.9–12.7)
POTASSIUM SERPL-SCNC: 3.6 MMOL/L (ref 3.5–5.3)
PROT SERPL-MCNC: 5.8 G/DL (ref 6.4–8.4)
PROTHROMBIN TIME: 15.1 SECONDS (ref 11.6–14.5)
RBC # BLD AUTO: 1.98 MILLION/UL (ref 3.88–5.62)
RBC # BLD AUTO: 2.3 MILLION/UL (ref 3.88–5.62)
RH BLD: POSITIVE
RH BLD: POSITIVE
SODIUM SERPL-SCNC: 139 MMOL/L (ref 135–147)
SPECIMEN EXPIRATION DATE: NORMAL
WBC # BLD AUTO: 11.01 THOUSAND/UL (ref 4.31–10.16)
WBC # BLD AUTO: 13.35 THOUSAND/UL (ref 4.31–10.16)

## 2022-11-10 PROCEDURE — 30233N1 TRANSFUSION OF NONAUTOLOGOUS RED BLOOD CELLS INTO PERIPHERAL VEIN, PERCUTANEOUS APPROACH: ICD-10-PCS | Performed by: HOSPITALIST

## 2022-11-10 RX ORDER — SODIUM CHLORIDE, SODIUM GLUCONATE, SODIUM ACETATE, POTASSIUM CHLORIDE, MAGNESIUM CHLORIDE, SODIUM PHOSPHATE, DIBASIC, AND POTASSIUM PHOSPHATE .53; .5; .37; .037; .03; .012; .00082 G/100ML; G/100ML; G/100ML; G/100ML; G/100ML; G/100ML; G/100ML
500 INJECTION, SOLUTION INTRAVENOUS ONCE
Status: COMPLETED | OUTPATIENT
Start: 2022-11-10 | End: 2022-11-10

## 2022-11-10 RX ADMIN — PIPERACILLIN AND TAZOBACTAM 3.38 G: 3; .375 INJECTION, POWDER, LYOPHILIZED, FOR SOLUTION INTRAVENOUS at 12:43

## 2022-11-10 RX ADMIN — INSULIN LISPRO 3 UNITS: 100 INJECTION, SOLUTION INTRAVENOUS; SUBCUTANEOUS at 12:44

## 2022-11-10 RX ADMIN — PIPERACILLIN AND TAZOBACTAM 3.38 G: 3; .375 INJECTION, POWDER, LYOPHILIZED, FOR SOLUTION INTRAVENOUS at 18:01

## 2022-11-10 RX ADMIN — INSULIN GLARGINE 8 UNITS: 100 INJECTION, SOLUTION SUBCUTANEOUS at 21:37

## 2022-11-10 RX ADMIN — INSULIN LISPRO 3 UNITS: 100 INJECTION, SOLUTION INTRAVENOUS; SUBCUTANEOUS at 09:14

## 2022-11-10 RX ADMIN — INSULIN LISPRO 1 UNITS: 100 INJECTION, SOLUTION INTRAVENOUS; SUBCUTANEOUS at 18:04

## 2022-11-10 RX ADMIN — OXYCODONE HYDROCHLORIDE 10 MG: 5 TABLET ORAL at 18:31

## 2022-11-10 RX ADMIN — PIPERACILLIN AND TAZOBACTAM 3.38 G: 3; .375 INJECTION, POWDER, LYOPHILIZED, FOR SOLUTION INTRAVENOUS at 05:19

## 2022-11-10 RX ADMIN — INSULIN LISPRO 3 UNITS: 100 INJECTION, SOLUTION INTRAVENOUS; SUBCUTANEOUS at 18:04

## 2022-11-10 RX ADMIN — NICOTINE 1 PATCH: 21 PATCH, EXTENDED RELEASE TRANSDERMAL at 09:14

## 2022-11-10 RX ADMIN — HEPARIN SODIUM 24 UNITS/KG/HR: 10000 INJECTION, SOLUTION INTRAVENOUS at 21:36

## 2022-11-10 RX ADMIN — HEPARIN SODIUM 20 UNITS/KG/HR: 10000 INJECTION, SOLUTION INTRAVENOUS at 05:28

## 2022-11-10 RX ADMIN — OXYCODONE HYDROCHLORIDE 10 MG: 5 TABLET ORAL at 12:42

## 2022-11-10 RX ADMIN — OXYCODONE HYDROCHLORIDE 10 MG: 5 TABLET ORAL at 05:19

## 2022-11-10 RX ADMIN — SODIUM CHLORIDE, SODIUM GLUCONATE, SODIUM ACETATE, POTASSIUM CHLORIDE, MAGNESIUM CHLORIDE, SODIUM PHOSPHATE, DIBASIC, AND POTASSIUM PHOSPHATE 500 ML: .53; .5; .37; .037; .03; .012; .00082 INJECTION, SOLUTION INTRAVENOUS at 21:00

## 2022-11-10 NOTE — PLAN OF CARE
Problem: Nutrition/Hydration-ADULT  Goal: Nutrient/Hydration intake appropriate for improving, restoring or maintaining nutritional needs  Description: Monitor and assess patient's nutrition/hydration status for malnutrition  Collaborate with interdisciplinary team and initiate plan and interventions as ordered  Monitor patient's weight and dietary intake as ordered or per policy  Utilize nutrition screening tool and intervene as necessary  Determine patient's food preferences and provide high-protein, high-caloric foods as appropriate       INTERVENTIONS:  - Monitor oral intake, urinary output, labs, and treatment plans  - Assess nutrition and hydration status and recommend course of action  - Evaluate amount of meals eaten  - Assist patient with eating if necessary   - Allow adequate time for meals  - Recommend/ encourage appropriate diets, oral nutritional supplements, and vitamin/mineral supplements  - Order, calculate, and assess calorie counts as needed  - Recommend, monitor, and adjust tube feedings and TPN/PPN based on assessed needs  - Assess need for intravenous fluids  - Provide specific nutrition/hydration education as appropriate  - Include patient/family/caregiver in decisions related to nutrition  Outcome: Progressing     Problem: MOBILITY - ADULT  Goal: Maintain or return to baseline ADL function  Description: INTERVENTIONS:  -  Assess patient's ability to carry out ADLs; assess patient's baseline for ADL function and identify physical deficits which impact ability to perform ADLs (bathing, care of mouth/teeth, toileting, grooming, dressing, etc )  - Assess/evaluate cause of self-care deficits   - Assess range of motion  - Assess patient's mobility; develop plan if impaired  - Assess patient's need for assistive devices and provide as appropriate  - Encourage maximum independence but intervene and supervise when necessary  - Involve family in performance of ADLs  - Assess for home care needs following discharge   - Consider OT consult to assist with ADL evaluation and planning for discharge  - Provide patient education as appropriate  Outcome: Progressing  Goal: Maintains/Returns to pre admission functional level  Description: INTERVENTIONS:  - Perform BMAT or MOVE assessment daily    - Set and communicate daily mobility goal to care team and patient/family/caregiver  - Collaborate with rehabilitation services on mobility goals if consulted  - Perform Range of Motion x times a day  - Reposition patient every x hours    - Dangle patient x times a day  - Stand patient x times a day  - Ambulate patient x times a day  - Out of bed to chair x times a day   - Out of bed for meals x times a day  - Out of bed for toileting  - Record patient progress and toleration of activity level   Outcome: Progressing     Problem: Prexisting or High Potential for Compromised Skin Integrity  Goal: Skin integrity is maintained or improved  Description: INTERVENTIONS:  - Identify patients at risk for skin breakdown  - Assess and monitor skin integrity  - Assess and monitor nutrition and hydration status  - Monitor labs   - Assess for incontinence   - Turn and reposition patient  - Assist with mobility/ambulation  - Relieve pressure over bony prominences  - Avoid friction and shearing  - Provide appropriate hygiene as needed including keeping skin clean and dry  - Evaluate need for skin moisturizer/barrier cream  - Collaborate with interdisciplinary team   - Patient/family teaching  - Consider wound care consult   Outcome: Progressing     Problem: PAIN - ADULT  Goal: Verbalizes/displays adequate comfort level or baseline comfort level  Description: Interventions:  - Encourage patient to monitor pain and request assistance  - Assess pain using appropriate pain scale  - Administer analgesics based on type and severity of pain and evaluate response  - Implement non-pharmacological measures as appropriate and evaluate response  - Consider cultural and social influences on pain and pain management  - Notify physician/advanced practitioner if interventions unsuccessful or patient reports new pain  Outcome: Progressing     Problem: INFECTION - ADULT  Goal: Absence or prevention of progression during hospitalization  Description: INTERVENTIONS:  - Assess and monitor for signs and symptoms of infection  - Monitor lab/diagnostic results  - Monitor all insertion sites, i e  indwelling lines, tubes, and drains  - Monitor endotracheal if appropriate and nasal secretions for changes in amount and color  - [place] appropriate cooling/warming therapies per order  - Administer medications as ordered  - Instruct and encourage patient and family to use good hand hygiene technique  - Identify and instruct in appropriate isolation precautions for identified infection/condition  Outcome: Progressing  Goal: Absence of fever/infection during neutropenic period  Description: INTERVENTIONS:  - Monitor WBC    Outcome: Progressing     Problem: SAFETY ADULT  Goal: Maintain or return to baseline ADL function  Description: INTERVENTIONS:  -  Assess patient's ability to carry out ADLs; assess patient's baseline for ADL function and identify physical deficits which impact ability to perform ADLs (bathing, care of mouth/teeth, toileting, grooming, dressing, etc )  - Assess/evaluate cause of self-care deficits   - Assess range of motion  - Assess patient's mobility; develop plan if impaired  - Assess patient's need for assistive devices and provide as appropriate  - Encourage maximum independence but intervene and supervise when necessary  - Involve family in performance of ADLs  - Assess for home care needs following discharge   - Consider OT consult to assist with ADL evaluation and planning for discharge  - Provide patient education as appropriate  Outcome: Progressing  Goal: Maintains/Returns to pre admission functional level  Description: INTERVENTIONS:  - Perform BMAT or MOVE assessment daily    - Set and communicate daily mobility goal to care team and patient/family/caregiver  - Collaborate with rehabilitation services on mobility goals if consulted  - Perform Range of Motion x times a day  - Reposition patient every x hours    - Dangle patient x times a day  - Stand patient x times a day  - Ambulate patient x times a day  - Out of bed to chair x times a day   - Out of bed for meals x times a day  - Out of bed for toileting  - Record patient progress and toleration of activity level   Outcome: Progressing  Goal: Patient will remain free of falls  Description: INTERVENTIONS:  - Educate patient/family on patient safety including physical limitations  - Instruct patient to call for assistance with activity   - Consult OT/PT to assist with strengthening/mobility   - Keep Call bell within reach  - Keep bed low and locked with side rails adjusted as appropriate  - Keep care items and personal belongings within reach  - Initiate and maintain comfort rounds  - Make Fall Risk Sign visible to staff  - Offer Toileting every x Hours, in advance of need  - Initiate/Maintain xalarm  - Obtain necessary fall risk management equipment: x  - Apply yellow socks and bracelet for high fall risk patients  - Consider moving patient to room near nurses station  Outcome: Progressing     Problem: DISCHARGE PLANNING  Goal: Discharge to home or other facility with appropriate resources  Description: INTERVENTIONS:  - Identify barriers to discharge w/patient and caregiver  - Arrange for needed discharge resources and transportation as appropriate  - Identify discharge learning needs (meds, wound care, etc )  - Arrange for interpretive services to assist at discharge as needed  - Refer to Case Management Department for coordinating discharge planning if the patient needs post-hospital services based on physician/advanced practitioner order or complex needs related to functional status, cognitive ability, or social support system  Outcome: Progressing     Problem: Knowledge Deficit  Goal: Patient/family/caregiver demonstrates understanding of disease process, treatment plan, medications, and discharge instructions  Description: Complete learning assessment and assess knowledge base    Interventions:  - Provide teaching at level of understanding  - Provide teaching via preferred learning methods  Outcome: Progressing     Problem: Potential for Falls  Goal: Patient will remain free of falls  Description: INTERVENTIONS:  - Educate patient/family on patient safety including physical limitations  - Instruct patient to call for assistance with activity   - Consult OT/PT to assist with strengthening/mobility   - Keep Call bell within reach  - Keep bed low and locked with side rails adjusted as appropriate  - Keep care items and personal belongings within reach  - Initiate and maintain comfort rounds  - Make Fall Risk Sign visible to staff  - Offer Toileting every x Hours, in advance of need  - Initiate/Maintain xalarm  - Obtain necessary fall risk management equipment: x  - Apply yellow socks and bracelet for high fall risk patients  - Consider moving patient to room near nurses station  Outcome: Progressing     Problem: CARDIOVASCULAR - ADULT  Goal: Maintains optimal cardiac output and hemodynamic stability  Description: INTERVENTIONS:  - Monitor I/O, vital signs and rhythm  - Monitor for S/S and trends of decreased cardiac output  - Administer and titrate ordered vasoactive medications to optimize hemodynamic stability  - Assess quality of pulses, skin color and temperature  - Assess for signs of decreased coronary artery perfusion  - Instruct patient to report change in severity of symptoms  Outcome: Progressing  Goal: Absence of cardiac dysrhythmias or at baseline rhythm  Description: INTERVENTIONS:  - Continuous cardiac monitoring, vital signs, obtain 12 lead EKG if ordered  - Administer antiarrhythmic and heart rate control medications as ordered  - Monitor electrolytes and administer replacement therapy as ordered  Outcome: Progressing     Problem: RESPIRATORY - ADULT  Goal: Achieves optimal ventilation and oxygenation  Description: INTERVENTIONS:  - Assess for changes in respiratory status  - Assess for changes in mentation and behavior  - Position to facilitate oxygenation and minimize respiratory effort  - Oxygen administered by appropriate delivery if ordered  - Initiate smoking cessation education as indicated  - Encourage broncho-pulmonary hygiene including cough, deep breathe, Incentive Spirometry  - Assess the need for suctioning and aspirate as needed  - Assess and instruct to report SOB or any respiratory difficulty  - Respiratory Therapy support as indicated  Outcome: Progressing     Problem: METABOLIC, FLUID AND ELECTROLYTES - ADULT  Goal: Electrolytes maintained within normal limits  Description: INTERVENTIONS:  - Monitor labs and assess patient for signs and symptoms of electrolyte imbalances  - Administer electrolyte replacement as ordered  - Monitor response to electrolyte replacements, including repeat lab results as appropriate  - Instruct patient on fluid and nutrition as appropriate  Outcome: Progressing  Goal: Fluid balance maintained  Description: INTERVENTIONS:  - Monitor labs   - Monitor I/O and WT  - Instruct patient on fluid and nutrition as appropriate  - Assess for signs & symptoms of volume excess or deficit  Outcome: Progressing  Goal: Glucose maintained within target range  Description: INTERVENTIONS:  - Monitor Blood Glucose as ordered  - Assess for signs and symptoms of hyperglycemia and hypoglycemia  - Administer ordered medications to maintain glucose within target range  - Assess nutritional intake and initiate nutrition service referral as needed  Outcome: Progressing     Problem: SKIN/TISSUE INTEGRITY - ADULT  Goal: Skin Integrity remains intact(Skin Breakdown Prevention)  Description: Assess:  -Perform Ashish assessment every x  -Clean and moisturize skin every x  -Inspect skin when repositioning, toileting, and assisting with ADLS  -Assess under medical devices such as x every x  -Assess extremities for adequate circulation and sensation     Bed Management:  -Have minimal linens on bed & keep smooth, unwrinkled  -Change linens as needed when moist or perspiring  -Avoid sitting or lying in one position for more than x hours while in bed  -Keep HOB at [facility]     Toileting:  -Offer bedside commode  -Assess for incontinence every x  -Use incontinent care products after each incontinent episode such as x    Activity:  -Mobilize patient x times a day  -Encourage activity and walks on unit  -Encourage or provide ROM exercises   -Turn and reposition patient every x Hours  -Use appropriate equipment to lift or move patient in bed  -Instruct/ Assist with weight shifting every x when out of bed in chair  -Consider limitation of chair time x hour intervals    Skin Care:  -Avoid use of baby powder, tape, friction and shearing, hot water or constrictive clothing  -Relieve pressure over bony prominences using x  -Do not massage red bony areas    Next Steps:  -Teach patient strategies to minimize risks such as x   -Consider consults to  interdisciplinary teams such as x  Outcome: Progressing     Problem: HEMATOLOGIC - ADULT  Goal: Maintains hematologic stability  Description: INTERVENTIONS  - Assess for signs and symptoms of bleeding or hemorrhage  - Monitor labs  - Administer supportive blood products/factors as ordered and appropriate  Outcome: Progressing     Problem: MUSCULOSKELETAL - ADULT  Goal: Maintain or return mobility to safest level of function  Description: INTERVENTIONS:  - Assess patient's ability to carry out ADLs; assess patient's baseline for ADL function and identify physical deficits which impact ability to perform ADLs (bathing, care of mouth/teeth, toileting, grooming, dressing, etc )  - Assess/evaluate cause of self-care deficits   - Assess range of motion  - Assess patient's mobility  - Assess patient's need for assistive devices and provide as appropriate  - Encourage maximum independence but intervene and supervise when necessary  - Involve family in performance of ADLs  - Assess for home care needs following discharge   - Consider OT consult to assist with ADL evaluation and planning for discharge  - Provide patient education as appropriate  Outcome: Progressing

## 2022-11-10 NOTE — QUICK NOTE
Attempted to reach Bertaryn Alleseven for blood consent  Unable to reach  Will try again later  9:36 was able to reach [facility]

## 2022-11-10 NOTE — MALNUTRITION/BMI
This medical record reflects one or more clinical indicators suggestive of malnutrition and/or morbid obesity  Malnutrition Findings:   Adult Malnutrition type: Chronic illness  Adult Degree of Malnutrition: Other severe protein calorie malnutrition  Malnutrition Characteristics: Fat loss, Muscle loss                  360 Statement: Pt presents with severe protein calorie malnutrition as evidenced by orbital hollowing, temporal scooping and prominent clavicle bone protrusion  Treat with CCD2, Glucerna supplement TID  BMI Findings: Body mass index is 23 63 kg/m²  See Nutrition note dated 11/10/22  for additional details  Completed nutrition assessment is viewable in the nutrition documentation

## 2022-11-10 NOTE — PROGRESS NOTES
[place]  Progress Note Jaylin Motley 1960, 58 y o  male MRN: [id number]  Unit/Bed#: -01 Encounter: [id number]  Primary Care Provider: No primary care provider on file  Date and time admitted to hospital: 11/2/2022  9:13 PM    Anemia  Assessment & Plan  Severe anemia, progressive   Likely related to malignancy  Repeat hgb today        Hypokalemia  Assessment & Plan  Replete aggressively  resolved    Pedal edema  Assessment & Plan  · B/l pedal edema (R>L) for a couple of days   · Due to probable newly diagnosed metastatic cancer on CT scan, which demonstrated DVTs     Hyponatremia  Assessment & Plan  · resolved    Elevated glucose  Assessment & Plan  · Serum glucose on admission is 233   · No prior reported history of DM, however patient poor historian  ·   hemoglobin A1c 9 1  ·   start on SSI    Hypercalcemia  Assessment & Plan  · PTH low, hypercalcemia of malignancy is etiology   · apprec nephrology assistance  · S/p IVF, lasix, zometa  Slowly improving  Schizophrenia ([facility] [address] )  Assessment & Plan  · Self-reported history of schizophrenia   · Reportedly treated with clonazepam 100 mg HS, however patient reports non adherence with this   · Presented to the ED with acute psychosis requiring Haldol 5 mg IM x1  · Neuro- psychiatry was consulted because of question about patient's capacity  Patient did not seem to have insight into his condition  He does not have capacity to make medical decisions at this time  Abnormal CT scan, gallbladder  Assessment & Plan  · Distended gallbladder on CT scan   · Obtain right upper quadrant ultrasound patient also with elevated T bili and alk-phos  · Completed zosyn  No acute surgical needs by general surgery   No suspicion for cholecystitis per surg    SIRS (systemic inflammatory response syndrome) (HCC)  Assessment & Plan  · Sirs criteria met on admission:  Tachycardia and leukocytosis at 17 36 K without bandemia   · No severe sepsis criteria met   · Suspect reactive from suspected metastatic lung cancer as seen on CT scan   · UA without infection   · Procalcitonin elevated at 11 67-> 3    · ? Elevation secondary to infection or possible small cell lung cancer  · CT scan does show distended gallbladder and with elevated T bili and alk-phos, received IV Zosyn to cover for possible infection  Surgery suggests no acute surgical intervention  · Blood cultures x2, neg  · Dc zosyn  and observe  Pt had hypothermia/fevers  · Reinstate zosyn  Repeat blood cultures pending  * Suspected Primary malignant neoplasm of left lung metastatic to other site [facility])  Assessment & Plan  · Presents with generalized weakness, fatigue, and weight loss  · CT a/P: " 2 5 x 2 2 x 2 2 cm mass in the left lower lobe concerning for lung cancer  Lytic lesions within the left 6th rib and pelvis bones compatible with osseous metastasis "  · Longstanding smoking history  · Will ask pulm to evaluate  · Heme-Onc recommended tissue diagnosis with complete staging  They recommended patient will need anticoagulation which will be lifelong based on probable stage IV malignancy causing hypercoagulable state  · IR tissue biopsy performed -   · Palliative care is on board       VTE  Prophylaxis:   Pharmacologic: in place    Patient Centered Rounds: I have performed bedside rounds with nursing staff today  Discussions with Specialists or Other Care Team Provider: case management    Education and Discussions with Family / Patient: pt      Current Length of Stay: 7 day(s)    Current Patient Status: Inpatient        Code Status: Level 1 - Full Code      Subjective:   Pt without pain or complaints of bleeed    Patient is seen and examined at bedside  All other ROS are negative      Objective:     Vitals:   Temp (24hrs), Av 1 °F (36 7 °C), Min:98 °F (36 7 °C), Max:98 1 °F (36 7 °C)    Temp:  [98 °F (36 7 °C)-98 1 °F (36 7 °C)] 98 °F (36 7 °C)  HR:  [105-112] 112  BP: ()/(55-63) 99/57  SpO2:  [93 %-95 %] 93 %  Body mass index is 23 63 kg/m²  Input and Output Summary (last 24 hours): Intake/Output Summary (Last 24 hours) at 11/10/2022 0933  Last data filed at 11/9/2022 2354  Gross per 24 hour   Intake 480 ml   Output 625 ml   Net -145 ml       Physical Exam:       GEN: No acute distress, comfortable, chronically ill  HEEENT: No JVD, PERRLA, no scleral icterus  RESP: Lungs clear to auscultation bilaterally  CV: RRR, +s1/s2   ABD: SOFT NON TENDER, POSITIVE BOWEL SOUNDS, NO DISTENTION  PSYCH: CALM  NEURO:   NO FOCAL DEFICITS  SKIN: NO RASH  EXTREM: NO EDEMA    Additional Data:     Labs:    Results from last 7 days   Lab Units 11/10/22  0509   WBC Thousand/uL 13 35*   HEMOGLOBIN g/dL 7 2*   HEMATOCRIT % 21 9*   PLATELETS Thousands/uL 135*   NEUTROS PCT % 75   LYMPHS PCT % 13*   MONOS PCT % 10   EOS PCT % 1     Results from last 7 days   Lab Units 11/10/22  0509   SODIUM mmol/L 139   POTASSIUM mmol/L 3 6   CHLORIDE mmol/L 101   CO2 mmol/L 32   BUN mg/dL 24   CREATININE mg/dL 1 14   ANION GAP mmol/L 6   CALCIUM mg/dL 9 2   ALBUMIN g/dL 1 9*   TOTAL BILIRUBIN mg/dL 0 50   ALK PHOS U/L 84   ALT U/L 12   AST U/L 15   GLUCOSE RANDOM mg/dL 103     Results from last 7 days   Lab Units 11/10/22  0509   INR  1 11     Results from last 7 days   Lab Units 11/10/22  0803 11/09/22  2100 11/09/22  1646 11/09/22  1137 11/09/22  0801 11/08/22  2052 11/08/22  1759 11/08/22  1057 11/08/22  0750 11/07/22  2228 11/07/22  1613 11/07/22  1122   POC GLUCOSE mg/dl 98 109 148* 158* 160* 179* 253* 158* 179* 195* 176* 163*         Results from last 7 days   Lab Units 11/04/22  0254   PROCALCITONIN ng/ml 3 26*           * I Have Reviewed All Lab Data Listed Above  Imaging:     Results for orders placed during the hospital encounter of 11/02/22    XR chest portable    Narrative  CHEST    INDICATION:   Cough, tachycardia, weakness      COMPARISON:  Abdomen CT 11/3/2022, CXR 3/20/2006  EXAM PERFORMED/VIEWS:  XR CHEST PORTABLE      FINDINGS:    Cardiomediastinal silhouette appears unremarkable  2 6 cm solid left lower lobe mass corresponding with the abdomen CT   4 cm cavitary right upper lobe mass  No acute disease  No effusion or pneumothorax  Left lateral 6th rib metastasis corresponding with the abdomen CT probable posterior left 5th rib metastasis  Impression  Right upper lobe cavitary mass, left lower lobe mass, and left rib metastases, some which are visible on the abdomen CT  Findings on the abdomen CT were reported to the emergency physician by Dr Jerica Temple  Workstation performed: [id number]    No results found for this or any previous visit  *I have reviewed all imaging reports listed above      Recent Cultures (last 7 days):     Results from last 7 days   Lab Units 11/09/22  0352   BLOOD CULTURE  Received in Microbiology Lab  Culture in Progress  Received in Microbiology Lab  Culture in Progress         Last 24 Hours Medication List:   Current Facility-Administered Medications   Medication Dose Route Frequency Provider Last Rate   • acetaminophen  650 mg Oral Q8H PRN Ana Payan PA-C     • heparin (porcine)  3-30 Units/kg/hr (Order-Specific) Intravenous Titrated Tania Paredes MD 22 Units/kg/hr (11/10/22 0640)   • influenza vaccine  0 5 mL Intramuscular Prior to discharge Ana Payan PA-C     • insulin glargine  8 Units Subcutaneous HS Tania Paredes MD     • insulin lispro  1-5 Units Subcutaneous TID AC Ana Payan PA-C     • insulin lispro  1-5 Units Subcutaneous HS Ana Payan PA-C     • insulin lispro  3 Units Subcutaneous TID With Meals Ana Payan PA-C     • lidocaine  1 patch Topical Daily Tania Paredes MD     • lidocaine  1 patch Topical Daily Tania Paredes MD     • nicotine  1 patch Transdermal Daily Ana Payan PA-C     • OLANZapine  5 mg Oral HS Suzie Menjivar MD     • ondansetron  4 mg Intravenous Q6H PRN Comfort Hernandez PA-C     • oxyCODONE  10 mg Oral Q4H PRN Shonna Sykes MD     • oxyCODONE  5 mg Oral Q6H PRN Shonna Sykes MD     • piperacillin-tazobactam  3 375 g Intravenous Q6H Shonna Sykes MD 3 375 g (11/09/22 1200)   • senna  1 tablet Oral HS PRN Comfort Hernandez PA-C          Today, Patient Was Seen By: Shonna Sykes MD    ** Please Note: Dictation voice to text software may have been used in the creation of this document   **

## 2022-11-11 PROBLEM — E43 SEVERE PROTEIN-CALORIE MALNUTRITION (HCC): Status: ACTIVE | Noted: 2022-11-11

## 2022-11-11 LAB
ABO GROUP BLD BPU: NORMAL
ALBUMIN SERPL BCP-MCNC: 1.8 G/DL (ref 3.5–5)
ALP SERPL-CCNC: 83 U/L (ref 46–116)
ALT SERPL W P-5'-P-CCNC: 13 U/L (ref 12–78)
ANION GAP SERPL CALCULATED.3IONS-SCNC: 7 MMOL/L (ref 4–13)
APTT PPP: 55 SECONDS (ref 23–37)
APTT PPP: 82 SECONDS (ref 23–37)
APTT PPP: 88 SECONDS (ref 23–37)
AST SERPL W P-5'-P-CCNC: 16 U/L (ref 5–45)
BASOPHILS # BLD AUTO: 0.02 THOUSANDS/ÂΜL (ref 0–0.1)
BASOPHILS NFR BLD AUTO: 0 % (ref 0–1)
BILIRUB SERPL-MCNC: 0.7 MG/DL (ref 0.2–1)
BPU ID: NORMAL
BUN SERPL-MCNC: 23 MG/DL (ref 5–25)
CALCIUM ALBUM COR SERPL-MCNC: 10.1 MG/DL (ref 8.3–10.1)
CALCIUM SERPL-MCNC: 8.3 MG/DL (ref 8.3–10.1)
CHLORIDE SERPL-SCNC: 100 MMOL/L (ref 96–108)
CO2 SERPL-SCNC: 30 MMOL/L (ref 21–32)
CREAT SERPL-MCNC: 1.2 MG/DL (ref 0.6–1.3)
CROSSMATCH: NORMAL
EOSINOPHIL # BLD AUTO: 0.04 THOUSAND/ÂΜL (ref 0–0.61)
EOSINOPHIL NFR BLD AUTO: 0 % (ref 0–6)
ERYTHROCYTE [DISTWIDTH] IN BLOOD BY AUTOMATED COUNT: 16.2 % (ref 11.6–15.1)
GFR SERPL CREATININE-BSD FRML MDRD: 64 ML/MIN/1.73SQ M
GLUCOSE SERPL-MCNC: 133 MG/DL (ref 65–140)
GLUCOSE SERPL-MCNC: 139 MG/DL (ref 65–140)
GLUCOSE SERPL-MCNC: 245 MG/DL (ref 65–140)
GLUCOSE SERPL-MCNC: 274 MG/DL (ref 65–140)
GLUCOSE SERPL-MCNC: 297 MG/DL (ref 65–140)
HCT VFR BLD AUTO: 23.3 % (ref 36.5–49.3)
HGB BLD-MCNC: 7.7 G/DL (ref 12–17)
IMM GRANULOCYTES # BLD AUTO: 0.11 THOUSAND/UL (ref 0–0.2)
IMM GRANULOCYTES NFR BLD AUTO: 1 % (ref 0–2)
INR PPP: 1.08 (ref 0.84–1.19)
LYMPHOCYTES # BLD AUTO: 1.56 THOUSANDS/ÂΜL (ref 0.6–4.47)
LYMPHOCYTES NFR BLD AUTO: 13 % (ref 14–44)
MCH RBC QN AUTO: 31.3 PG (ref 26.8–34.3)
MCHC RBC AUTO-ENTMCNC: 33 G/DL (ref 31.4–37.4)
MCV RBC AUTO: 95 FL (ref 82–98)
MONOCYTES # BLD AUTO: 1.12 THOUSAND/ÂΜL (ref 0.17–1.22)
MONOCYTES NFR BLD AUTO: 10 % (ref 4–12)
NEUTROPHILS # BLD AUTO: 8.89 THOUSANDS/ÂΜL (ref 1.85–7.62)
NEUTS SEG NFR BLD AUTO: 76 % (ref 43–75)
NRBC BLD AUTO-RTO: 0 /100 WBCS
PLATELET # BLD AUTO: 118 THOUSANDS/UL (ref 149–390)
PMV BLD AUTO: 10.3 FL (ref 8.9–12.7)
POTASSIUM SERPL-SCNC: 3.2 MMOL/L (ref 3.5–5.3)
PROT SERPL-MCNC: 5.5 G/DL (ref 6.4–8.4)
PROTHROMBIN TIME: 14.8 SECONDS (ref 11.6–14.5)
RBC # BLD AUTO: 2.46 MILLION/UL (ref 3.88–5.62)
SODIUM SERPL-SCNC: 137 MMOL/L (ref 135–147)
UNIT DISPENSE STATUS: NORMAL
UNIT PRODUCT CODE: NORMAL
UNIT PRODUCT VOLUME: 350 ML
UNIT RH: NORMAL
WBC # BLD AUTO: 11.74 THOUSAND/UL (ref 4.31–10.16)

## 2022-11-11 RX ORDER — POTASSIUM CHLORIDE 20MEQ/15ML
40 LIQUID (ML) ORAL ONCE
Status: COMPLETED | OUTPATIENT
Start: 2022-11-11 | End: 2022-11-11

## 2022-11-11 RX ADMIN — INSULIN LISPRO 3 UNITS: 100 INJECTION, SOLUTION INTRAVENOUS; SUBCUTANEOUS at 09:03

## 2022-11-11 RX ADMIN — OLANZAPINE 5 MG: 5 TABLET, FILM COATED ORAL at 03:53

## 2022-11-11 RX ADMIN — PIPERACILLIN AND TAZOBACTAM 3.38 G: 3; .375 INJECTION, POWDER, LYOPHILIZED, FOR SOLUTION INTRAVENOUS at 03:30

## 2022-11-11 RX ADMIN — INSULIN LISPRO 3 UNITS: 100 INJECTION, SOLUTION INTRAVENOUS; SUBCUTANEOUS at 17:05

## 2022-11-11 RX ADMIN — OLANZAPINE 5 MG: 5 TABLET, FILM COATED ORAL at 21:08

## 2022-11-11 RX ADMIN — POTASSIUM CHLORIDE 40 MEQ: 20 SOLUTION ORAL at 06:37

## 2022-11-11 RX ADMIN — LIDOCAINE 5% 1 PATCH: 700 PATCH TOPICAL at 09:06

## 2022-11-11 RX ADMIN — INSULIN GLARGINE 8 UNITS: 100 INJECTION, SOLUTION SUBCUTANEOUS at 21:19

## 2022-11-11 RX ADMIN — INSULIN LISPRO 2 UNITS: 100 INJECTION, SOLUTION INTRAVENOUS; SUBCUTANEOUS at 21:19

## 2022-11-11 RX ADMIN — OXYCODONE HYDROCHLORIDE 10 MG: 5 TABLET ORAL at 17:02

## 2022-11-11 RX ADMIN — HEPARIN SODIUM 24 UNITS/KG/HR: 10000 INJECTION, SOLUTION INTRAVENOUS at 13:35

## 2022-11-11 RX ADMIN — PIPERACILLIN AND TAZOBACTAM 3.38 G: 3; .375 INJECTION, POWDER, LYOPHILIZED, FOR SOLUTION INTRAVENOUS at 09:07

## 2022-11-11 RX ADMIN — INSULIN LISPRO 2 UNITS: 100 INJECTION, SOLUTION INTRAVENOUS; SUBCUTANEOUS at 12:26

## 2022-11-11 RX ADMIN — NICOTINE 1 PATCH: 21 PATCH, EXTENDED RELEASE TRANSDERMAL at 09:04

## 2022-11-11 RX ADMIN — ACETAMINOPHEN 650 MG: 325 TABLET, FILM COATED ORAL at 21:08

## 2022-11-11 RX ADMIN — ACETAMINOPHEN 650 MG: 325 TABLET, FILM COATED ORAL at 12:25

## 2022-11-11 RX ADMIN — ACETAMINOPHEN 650 MG: 325 TABLET, FILM COATED ORAL at 03:53

## 2022-11-11 RX ADMIN — OXYCODONE HYDROCHLORIDE 10 MG: 5 TABLET ORAL at 09:19

## 2022-11-11 RX ADMIN — INSULIN LISPRO 3 UNITS: 100 INJECTION, SOLUTION INTRAVENOUS; SUBCUTANEOUS at 12:27

## 2022-11-11 NOTE — QUICK NOTE
Noted that patient was afebrile at 11:00 p m  to 101° F  This was an axillary temperature  The temperature was taken prior to initiation of blood transfusion  Pt did not receive any tylenol for fever and oral temp rechecked at 0100 is 97 3F, suspect false axillary temp as nurse reports room temperature had been elevated and pt had blankets on  He is currently on day 9 of zosyn  intial blood cultures with no growth x5 days and repeat with no growth x24 hours  Will hold on changing abx for now and repeating blood cultures, as I suspect temperature was false reading  Tachycardia and BP can be explained by anemia with Hgb at 6 3

## 2022-11-11 NOTE — ASSESSMENT & PLAN NOTE
Severe anemia, progressive   Likely related to malignancy  No sx of bleeding  Transfused 1 unit with improvement  Repeat hgb tonight

## 2022-11-11 NOTE — ASSESSMENT & PLAN NOTE
· Presents with generalized weakness, fatigue, and weight loss  · CT a/P: " 2 5 x 2 2 x 2 2 cm mass in the left lower lobe concerning for lung cancer  Lytic lesions within the left 6th rib and pelvis bones compatible with osseous metastasis "  · Longstanding smoking history  · Will ask pulm to evaluate  · Heme-Onc recommended tissue diagnosis with complete staging    They recommended patient will need anticoagulation which will be lifelong based on probable stage IV malignancy causing hypercoagulable state  · IR tissue biopsy performed - 11/8- prelim positive for malignancy  · Palliative care is on board

## 2022-11-11 NOTE — ASSESSMENT & PLAN NOTE
Malnutrition Findings:   Adult Malnutrition type: Chronic illness  Adult Degree of Malnutrition: Other severe protein calorie malnutrition  Malnutrition Characteristics: Fat loss, Muscle loss                  360 Statement: Pt presents with severe protein calorie malnutrition as evidenced by orbital hollowing, temporal scooping and prominent clavicle bone protrusion  BMI Findings: Body mass index is 23 63 kg/m²

## 2022-11-11 NOTE — CONSULTS
Consultation - Infectious Disease   Luis Herman 58 y o  male MRN: [id number]  Unit/Bed#: -01 Encounter: [id number]      Assessment/Plan     Assessment:  29-year-old man with fever, new diagnosis of lung cancer, DVT    Plan:  1) Fever, Lung Ca, DVT, concern for infection, leukocytosis - Pt  with no improvement in overall symptoms on antibiotic therapy, negative cultures, and to alternative diagnoses (malignancy, DVT) which would explain fever, in and of itself potentially a measurement artifact rather than a tracy alma finding  Overall, clinical concern for superimposed infection is low, will discontinue antibiotics and would observe patient off of same     ===> Will DISCONTINUE PIPERACILLIN/TAZOBACTAM   ===> Observe patient off of antibiotics  Temperature in and of itself may not be a reliable indicator of infection and would not use this in and of itself as an indication of same     ===> Will sign off, but please do not hesitate to contact us with further questions, or if a change in the patient's clinical status warrants     ===> Patient does NOT need to schedule f/u with ID on d/c UNLESS a change in clinical status or a recrudescence of symptoms warrants, but was given our contact information should any issues with prescribed post-d/c treatment arise  History of Present Illness   Physician Requesting Consult: Tennille Broderick MD    HPI:   Briefly, this 29-year-old man with a past medical history significant for schizophrenia, and recent [facility] admission for lower back pain (records reviewed by me) presented to the [facility] emergency department on 11/02 complaining of weakness, and fatigue  Patient is a poor historian due to underlying schizophrenia, but at present complains of low back pain, and generally feeling weak, and fatigued   As part of admission workup, patient was found to have lesions on CT scan concerning for malignancy and indeed preliminary pathology results from biopsy of same is consistent with this diagnosis  Due to fevers admission and concern for possible superimposed infection, patient was started on broad antibiotics (piperacillin/tazobactam), however all blood cultures here for have been negative  Patient had a recorded fever of 102° last night, but per review of documentation from providers overnight this is thought to be a spurious axillary reading as a oral temperature taken shortly thereafter was unremarkable  Further, patient denies subjective fevers  Infectious diseases is consulted today due to concern for “persistent fevers”  Of note, patient's hospital course has also been complicated by diagnosis of deep vein thrombosis  Inpatient consult to Infectious Diseases  Consult performed by: Laura Barney MD  Consult ordered by: Misael Bermudez MD          Review of Systems  A complete review of systems was done and is negative except as per the HPI       Historical Information   Past Medical History:   Diagnosis Date   • Bladder infection      Past Surgical History:   Procedure Laterality Date   • IR BIOPSY BONE  11/8/2022   • IR THORACENTESIS  11/8/2022     Social History   Social History     Substance and Sexual Activity   Alcohol Use Not Currently     Social History     Substance and Sexual Activity   Drug Use Never     E-Cigarette/Vaping   • E-Cigarette Use Never User      E-Cigarette/Vaping Substances     Social History     Tobacco Use   Smoking Status Current Every Day Smoker   • Packs/day: 1 00   • Types: Cigarettes   Smokeless Tobacco Never Used     Family History: non-contributory    Meds/Allergies   all current active meds have been reviewed    No Known Allergies    Objective       Intake/Output Summary (Last 24 hours) at 11/11/2022 0857  Last data filed at 11/11/2022 0422  Gross per 24 hour   Intake 699 17 ml   Output 1275 ml   Net -575 83 ml       Invasive Devices:   Peripheral IV 11/07/22 Right;Ventral (anterior) Forearm (Active)   Site Assessment Clean;Dry; Intact 11/10/22 0733   Dressing Type Transparent 11/10/22 0733   Line Status Infusing 11/10/22 0733   Dressing Status Clean;Dry; Intact 11/10/22 0733   Dressing Change Due 11/11/22 11/07/22 0000   Reason Not Rotated Not due 11/07/22 0000       Peripheral IV 11/10/22 Dorsal (posterior); Right Hand (Active)   Site Assessment Clean;Dry; Intact 11/10/22 1834   Dressing Type Transparent 11/10/22 1834   Line Status Infusing;Flushed 11/10/22 1834   Dressing Status Clean;Dry; Intact 11/10/22 1834       External Urinary Catheter Medium (Active)   Collection Container Standard drainage bag 11/10/22 1128   Securement Method for Male Other (Comment) 11/10/22 1128   Interventions Pericare performed;Device changed 11/10/22 1128   Output (mL) 350 mL 11/10/22 1804       Physical Exam  Gen: AAO, NAD  HENT: Normocephalic, atraumatic, MMM, no oropharyngeal exudate  Neck: Supple, trachea midline  CV: No m/r/g appreciated, S1, S2   Pulm: No resp  Distress, lungs CTAB  Abd: S/NT/ND, no hepatomegaly appreciated  Ext: No LE edema, intact radial pulses  Lymph: No anterior cervical or supraclav  LAD appreciated  Skin: No rash on exposed skin, warm, dry  Psych: Nl  Mood, nl affect, tangential thought process  Neuro: Oriented, UE  strength intact     Lab Results: I have personally reviewed pertinent labs  Imaging Studies: I have personally reviewed pertinent reports  and I have personally reviewed pertinent films in PACS  EKG, Pathology, and Other Studies: I have personally reviewed pertinent reports

## 2022-11-11 NOTE — ASSESSMENT & PLAN NOTE
· Sirs criteria met on admission:  Tachycardia and leukocytosis at 17 36 K without bandemia   · No severe sepsis criteria met   · Suspect reactive from suspected metastatic lung cancer as seen on CT scan   · UA without infection   · Procalcitonin elevated at 11 67-> 3    · ? Elevation secondary to infection or possible small cell lung cancer  · CT scan does show distended gallbladder and with elevated T bili and alk-phos, received IV Zosyn to cover for possible infection  Surgery suggests no acute surgical intervention  · Blood cultures x2, neg  · Dc zosyn 11/7 and observe  Pt had hypothermia/fevers  · Reinstate zosyn  Repeat blood cultures neg  Will ask ID to evaluate as several fevers despite zosyn therapy  Fevers may be related to malignancy itself

## 2022-11-11 NOTE — PLAN OF CARE
Problem: Nutrition/Hydration-ADULT  Goal: Nutrient/Hydration intake appropriate for improving, restoring or maintaining nutritional needs  Description: Monitor and assess patient's nutrition/hydration status for malnutrition  Collaborate with interdisciplinary team and initiate plan and interventions as ordered  Monitor patient's weight and dietary intake as ordered or per policy  Utilize nutrition screening tool and intervene as necessary  Determine patient's food preferences and provide high-protein, high-caloric foods as appropriate       INTERVENTIONS:  - Monitor oral intake, urinary output, labs, and treatment plans  - Assess nutrition and hydration status and recommend course of action  - Evaluate amount of meals eaten  - Assist patient with eating if necessary   - Allow adequate time for meals  - Recommend/ encourage appropriate diets, oral nutritional supplements, and vitamin/mineral supplements  - Order, calculate, and assess calorie counts as needed  - Recommend, monitor, and adjust tube feedings and TPN/PPN based on assessed needs  - Assess need for intravenous fluids  - Provide specific nutrition/hydration education as appropriate  - Include patient/family/caregiver in decisions related to nutrition  Outcome: Progressing     Problem: MOBILITY - ADULT  Goal: Maintain or return to baseline ADL function  Description: INTERVENTIONS:  -  Assess patient's ability to carry out ADLs; assess patient's baseline for ADL function and identify physical deficits which impact ability to perform ADLs (bathing, care of mouth/teeth, toileting, grooming, dressing, etc )  - Assess/evaluate cause of self-care deficits   - Assess range of motion  - Assess patient's mobility; develop plan if impaired  - Assess patient's need for assistive devices and provide as appropriate  - Encourage maximum independence but intervene and supervise when necessary  - Involve family in performance of ADLs  - Assess for home care needs following discharge   - Consider OT consult to assist with ADL evaluation and planning for discharge  - Provide patient education as appropriate  Outcome: Progressing  Goal: Maintains/Returns to pre admission functional level  Description: INTERVENTIONS:  - Perform BMAT or MOVE assessment daily    - Set and communicate daily mobility goal to care team and patient/family/caregiver  - Collaborate with rehabilitation services on mobility goals if consulted  - Perform Range of Motion 2 times a day  - Reposition patient every 2 hours    - Dangle patient 2 times a day  - Stand patient 2 times a day  - Ambulate patient 2 times a day  - Out of bed to chair 2 times a day   - Out of bed for meals 2 times a day  - Out of bed for toileting  - Record patient progress and toleration of activity level   Outcome: Progressing     Problem: Prexisting or High Potential for Compromised Skin Integrity  Goal: Skin integrity is maintained or improved  Description: INTERVENTIONS:  - Identify patients at risk for skin breakdown  - Assess and monitor skin integrity  - Assess and monitor nutrition and hydration status  - Monitor labs   - Assess for incontinence   - Turn and reposition patient  - Assist with mobility/ambulation  - Relieve pressure over bony prominences  - Avoid friction and shearing  - Provide appropriate hygiene as needed including keeping skin clean and dry  - Evaluate need for skin moisturizer/barrier cream  - Collaborate with interdisciplinary team   - Patient/family teaching  - Consider wound care consult   Outcome: Progressing     Problem: PAIN - ADULT  Goal: Verbalizes/displays adequate comfort level or baseline comfort level  Description: Interventions:  - Encourage patient to monitor pain and request assistance  - Assess pain using appropriate pain scale  - Administer analgesics based on type and severity of pain and evaluate response  - Implement non-pharmacological measures as appropriate and evaluate response  - Consider cultural and social influences on pain and pain management  - Notify physician/advanced practitioner if interventions unsuccessful or patient reports new pain  Outcome: Progressing     Problem: INFECTION - ADULT  Goal: Absence or prevention of progression during hospitalization  Description: INTERVENTIONS:  - Assess and monitor for signs and symptoms of infection  - Monitor lab/diagnostic results  - Monitor all insertion sites, i e  indwelling lines, tubes, and drains  - Monitor endotracheal if appropriate and nasal secretions for changes in amount and color  - Herrin appropriate cooling/warming therapies per order  - Administer medications as ordered  - Instruct and encourage patient and family to use good hand hygiene technique  - Identify and instruct in appropriate isolation precautions for identified infection/condition  Outcome: Progressing  Goal: Absence of fever/infection during neutropenic period  Description: INTERVENTIONS:  - Monitor WBC    Outcome: Progressing     Problem: SAFETY ADULT  Goal: Maintain or return to baseline ADL function  Description: INTERVENTIONS:  -  Assess patient's ability to carry out ADLs; assess patient's baseline for ADL function and identify physical deficits which impact ability to perform ADLs (bathing, care of mouth/teeth, toileting, grooming, dressing, etc )  - Assess/evaluate cause of self-care deficits   - Assess range of motion  - Assess patient's mobility; develop plan if impaired  - Assess patient's need for assistive devices and provide as appropriate  - Encourage maximum independence but intervene and supervise when necessary  - Involve family in performance of ADLs  - Assess for home care needs following discharge   - Consider OT consult to assist with ADL evaluation and planning for discharge  - Provide patient education as appropriate  Outcome: Progressing  Goal: Maintains/Returns to pre admission functional level  Description: INTERVENTIONS:  - Perform BMAT or MOVE assessment daily    - Set and communicate daily mobility goal to care team and patient/family/caregiver  - Collaborate with rehabilitation services on mobility goals if consulted  - Perform Range of Motion 2 times a day  - Reposition patient every 2 hours    - Dangle patient 2 times a day  - Stand patient 2 times a day  - Ambulate patient 2 times a day  - Out of bed to chair 2 times a day   - Out of bed for meals 2 times a day  - Out of bed for toileting  - Record patient progress and toleration of activity level   Outcome: Progressing  Goal: Patient will remain free of falls  Description: INTERVENTIONS:  - Educate patient/family on patient safety including physical limitations  - Instruct patient to call for assistance with activity   - Consult OT/PT to assist with strengthening/mobility   - Keep Call bell within reach  - Keep bed low and locked with side rails adjusted as appropriate  - Keep care items and personal belongings within reach  - Initiate and maintain comfort rounds  - Make Fall Risk Sign visible to staff  - Offer Toileting every 2 Hours, in advance of need  - Initiate/Maintain bed alarm  - Obtain necessary fall risk management equipment: socks  - Apply yellow socks and bracelet for high fall risk patients  - Consider moving patient to room near nurses station  Outcome: Progressing     Problem: DISCHARGE PLANNING  Goal: Discharge to home or other facility with appropriate resources  Description: INTERVENTIONS:  - Identify barriers to discharge w/patient and caregiver  - Arrange for needed discharge resources and transportation as appropriate  - Identify discharge learning needs (meds, wound care, etc )  - Arrange for interpretive services to assist at discharge as needed  - Refer to Case Management Department for coordinating discharge planning if the patient needs post-hospital services based on physician/advanced practitioner order or complex needs related to functional status, cognitive ability, or social support system  Outcome: Progressing     Problem: Knowledge Deficit  Goal: Patient/family/caregiver demonstrates understanding of disease process, treatment plan, medications, and discharge instructions  Description: Complete learning assessment and assess knowledge base    Interventions:  - Provide teaching at level of understanding  - Provide teaching via preferred learning methods  Outcome: Progressing     Problem: CARDIOVASCULAR - ADULT  Goal: Maintains optimal cardiac output and hemodynamic stability  Description: INTERVENTIONS:  - Monitor I/O, vital signs and rhythm  - Monitor for S/S and trends of decreased cardiac output  - Administer and titrate ordered vasoactive medications to optimize hemodynamic stability  - Assess quality of pulses, skin color and temperature  - Assess for signs of decreased coronary artery perfusion  - Instruct patient to report change in severity of symptoms  Outcome: Progressing  Goal: Absence of cardiac dysrhythmias or at baseline rhythm  Description: INTERVENTIONS:  - Continuous cardiac monitoring, vital signs, obtain 12 lead EKG if ordered  - Administer antiarrhythmic and heart rate control medications as ordered  - Monitor electrolytes and administer replacement therapy as ordered  Outcome: Progressing     Problem: RESPIRATORY - ADULT  Goal: Achieves optimal ventilation and oxygenation  Description: INTERVENTIONS:  - Assess for changes in respiratory status  - Assess for changes in mentation and behavior  - Position to facilitate oxygenation and minimize respiratory effort  - Oxygen administered by appropriate delivery if ordered  - Initiate smoking cessation education as indicated  - Encourage broncho-pulmonary hygiene including cough, deep breathe, Incentive Spirometry  - Assess the need for suctioning and aspirate as needed  - Assess and instruct to report SOB or any respiratory difficulty  - Respiratory Therapy support as indicated  Outcome: Progressing     Problem: METABOLIC, FLUID AND ELECTROLYTES - ADULT  Goal: Electrolytes maintained within normal limits  Description: INTERVENTIONS:  - Monitor labs and assess patient for signs and symptoms of electrolyte imbalances  - Administer electrolyte replacement as ordered  - Monitor response to electrolyte replacements, including repeat lab results as appropriate  - Instruct patient on fluid and nutrition as appropriate  Outcome: Progressing  Goal: Fluid balance maintained  Description: INTERVENTIONS:  - Monitor labs   - Monitor I/O and WT  - Instruct patient on fluid and nutrition as appropriate  - Assess for signs & symptoms of volume excess or deficit  Outcome: Progressing  Goal: Glucose maintained within target range  Description: INTERVENTIONS:  - Monitor Blood Glucose as ordered  - Assess for signs and symptoms of hyperglycemia and hypoglycemia  - Administer ordered medications to maintain glucose within target range  - Assess nutritional intake and initiate nutrition service referral as needed  Outcome: Progressing     Problem: SKIN/TISSUE INTEGRITY - ADULT  Goal: Skin Integrity remains intact(Skin Breakdown Prevention)  Description: Assess:  -Perform Ashish assessment every shift  -Clean and moisturize skin every shift  -Inspect skin when repositioning, toileting, and assisting with ADLS  -Assess extremities for adequate circulation and sensation     Bed Management:  -Have minimal linens on bed & keep smooth, unwrinkled  -Change linens as needed when moist or perspiring  -Avoid sitting or lying in one position for more than 2 hours while in bed  -Keep HOB at 30 degrees     Toileting:  -Offer bedside commode  -Assess for incontinence every shift  -Use incontinent care products after each incontinent episode such as chucks    Activity:  -Mobilize patient 2 times a day  -Encourage activity and walks on unit  -Encourage or provide ROM exercises   -Turn and reposition patient every 2 Hours  -Use appropriate equipment to lift or move patient in bed  -Instruct/ Assist with weight shifting every 30 min when out of bed in chair  -Consider limitation of chair time 2 hour intervals    Skin Care:  -Avoid use of baby powder, tape, friction and shearing, hot water or constrictive clothing  -Relieve pressure over bony prominences using weight shifting  -Do not massage red bony areas    Next Steps:  -Teach patient strategies to minimize risks such as weight shifting   -Consider consults to  interdisciplinary teams such as   Outcome: Progressing     Problem: HEMATOLOGIC - ADULT  Goal: Maintains hematologic stability  Description: INTERVENTIONS  - Assess for signs and symptoms of bleeding or hemorrhage  - Monitor labs  - Administer supportive blood products/factors as ordered and appropriate  Outcome: Progressing     Problem: MUSCULOSKELETAL - ADULT  Goal: Maintain or return mobility to safest level of function  Description: INTERVENTIONS:  - Assess patient's ability to carry out ADLs; assess patient's baseline for ADL function and identify physical deficits which impact ability to perform ADLs (bathing, care of mouth/teeth, toileting, grooming, dressing, etc )  - Assess/evaluate cause of self-care deficits   - Assess range of motion  - Assess patient's mobility  - Assess patient's need for assistive devices and provide as appropriate  - Encourage maximum independence but intervene and supervise when necessary  - Involve family in performance of ADLs  - Assess for home care needs following discharge   - Consider OT consult to assist with ADL evaluation and planning for discharge  - Provide patient education as appropriate  Outcome: Progressing

## 2022-11-11 NOTE — PROGRESS NOTES
[place]  Progress Note Meldon Less 1960, 58 y o  male MRN: [id number]  Unit/Bed#: -01 Encounter: [id number]  Primary Care Provider: No primary care provider on file  Date and time admitted to hospital: 11/2/2022  9:13 PM    Severe protein-calorie malnutrition ([facility] [address] )  Assessment & Plan  Malnutrition Findings:   Adult Malnutrition type: Chronic illness  Adult Degree of Malnutrition: Other severe protein calorie malnutrition  Malnutrition Characteristics: Fat loss, Muscle loss                  360 Statement: Pt presents with severe protein calorie malnutrition as evidenced by orbital hollowing, temporal scooping and prominent clavicle bone protrusion  BMI Findings: Body mass index is 23 63 kg/m²  Anemia  Assessment & Plan  Severe anemia, progressive   Likely related to malignancy  No sx of bleeding  Transfused 1 unit with improvement  Repeat hgb tonight  Hypokalemia  Assessment & Plan  Replete aggressively  resolved    Pedal edema  Assessment & Plan  · B/l pedal edema (R>L) for a couple of days   · Due to probable newly diagnosed metastatic cancer on CT scan, which demonstrated DVTs     Hyponatremia  Assessment & Plan  · resolved    Elevated glucose  Assessment & Plan  · Serum glucose on admission is 233   · No prior reported history of DM, however patient poor historian  ·   hemoglobin A1c 9 1  ·   start on SSI    Hypercalcemia  Assessment & Plan  · PTH low, hypercalcemia of malignancy is etiology   · apprec nephrology assistance  · S/p IVF, lasix, zometa  Slowly improving  Schizophrenia ([facility] [address] )  Assessment & Plan  · Self-reported history of schizophrenia   · Reportedly treated with clonazepam 100 mg HS, however patient reports non adherence with this   · Presented to the ED with acute psychosis requiring Haldol 5 mg IM x1  · Neuro- psychiatry was consulted because of question about patient's capacity    Patient did not seem to have insight into his condition  He does not have capacity to make medical decisions at this time  Abnormal CT scan, gallbladder  Assessment & Plan  · Distended gallbladder on CT scan   · Obtain right upper quadrant ultrasound patient also with elevated T bili and alk-phos  · Completed zosyn  No acute surgical needs by general surgery  No suspicion for cholecystitis per surg    SIRS (systemic inflammatory response syndrome) (HCC)  Assessment & Plan  · Sirs criteria met on admission:  Tachycardia and leukocytosis at 17 36 K without bandemia   · No severe sepsis criteria met   · Suspect reactive from suspected metastatic lung cancer as seen on CT scan   · UA without infection   · Procalcitonin elevated at 11 67-> 3    · ? Elevation secondary to infection or possible small cell lung cancer  · CT scan does show distended gallbladder and with elevated T bili and alk-phos, received IV Zosyn to cover for possible infection  Surgery suggests no acute surgical intervention  · Blood cultures x2, neg  · Dc zosyn 11/7 and observe  Pt had hypothermia/fevers  · Reinstate zosyn  Repeat blood cultures neg  Will ask ID to evaluate as several fevers despite zosyn therapy  Fevers may be related to malignancy itself  * Suspected Primary malignant neoplasm of left lung metastatic to other site [facility])  Assessment & Plan  · Presents with generalized weakness, fatigue, and weight loss  · CT a/P: " 2 5 x 2 2 x 2 2 cm mass in the left lower lobe concerning for lung cancer  Lytic lesions within the left 6th rib and pelvis bones compatible with osseous metastasis "  · Longstanding smoking history  · Will ask pulm to evaluate  · Heme-Onc recommended tissue diagnosis with complete staging    They recommended patient will need anticoagulation which will be lifelong based on probable stage IV malignancy causing hypercoagulable state  · IR tissue biopsy performed - 11/8- prelim positive for malignancy  · Palliative care is on board         VTE  Prophylaxis: Pharmacologic: in place    Patient Centered Rounds: I have performed bedside rounds with nursing staff today  Discussions with Specialists or Other Care Team Provider: case management         Current Length of Stay: 8 day(s)    Current Patient Status: Inpatient        Code Status: Level 1 - Full Code      Subjective: pt with several fevers last night   Now feels better      Patient is seen and examined at bedside  All other ROS are negative  Objective:     Vitals:   Temp (24hrs), Av 8 °F (37 1 °C), Min:97 3 °F (36 3 °C), Max:101 °F (38 3 °C)    Temp:  [97 3 °F (36 3 °C)-101 °F (38 3 °C)] 97 8 °F (36 6 °C)  HR:  [103-118] 106  Resp:  [16-20] 16  BP: ()/(41-74) 105/55  SpO2:  [89 %-97 %] 95 %  Body mass index is 23 63 kg/m²  Input and Output Summary (last 24 hours):        Intake/Output Summary (Last 24 hours) at 2022 0956  Last data filed at 2022 0422  Gross per 24 hour   Intake 699 17 ml   Output 1275 ml   Net -575 83 ml       Physical Exam:       GEN: No acute distress, comfortable, chronically ill  HEEENT: No JVD, PERRLA, no scleral icterus  RESP: Lungs clear to auscultation bilaterally  CV: RRR, +s1/s2   ABD: SOFT NON TENDER, POSITIVE BOWEL SOUNDS, NO DISTENTION  PSYCH: CALM  NEURO:  At baseline, NO FOCAL DEFICITS  SKIN: NO RASH  EXTREM: NO EDEMA    Additional Data:     Labs:    Results from last 7 days   Lab Units 22  0417   WBC Thousand/uL 11 74*   HEMOGLOBIN g/dL 7 7*   HEMATOCRIT % 23 3*   PLATELETS Thousands/uL 118*   NEUTROS PCT % 76*   LYMPHS PCT % 13*   MONOS PCT % 10   EOS PCT % 0     Results from last 7 days   Lab Units 22  0417   SODIUM mmol/L 137   POTASSIUM mmol/L 3 2*   CHLORIDE mmol/L 100   CO2 mmol/L 30   BUN mg/dL 23   CREATININE mg/dL 1 20   ANION GAP mmol/L 7   CALCIUM mg/dL 8 3   ALBUMIN g/dL 1 8*   TOTAL BILIRUBIN mg/dL 0 70   ALK PHOS U/L 83   ALT U/L 13   AST U/L 16   GLUCOSE RANDOM mg/dL 133     Results from last 7 days   Lab Units 11/11/22  0417   INR  1 08     Results from last 7 days   Lab Units 11/11/22  0724 11/10/22  2116 11/10/22  1557 11/10/22  1155 11/10/22  0803 11/09/22  2100 11/09/22  1646 11/09/22  1137 11/09/22  0801 11/08/22  2052 11/08/22  1759 11/08/22  1057   POC GLUCOSE mg/dl 139 138 164* 109 98 109 148* 158* 160* 179* 253* 158*                   * I Have Reviewed All Lab Data Listed Above  Imaging:     Results for orders placed during the hospital encounter of 11/02/22    XR chest portable    Narrative  CHEST    INDICATION:   Cough, tachycardia, weakness  COMPARISON:  Abdomen CT 11/3/2022, CXR 3/20/2006  EXAM PERFORMED/VIEWS:  XR CHEST PORTABLE      FINDINGS:    Cardiomediastinal silhouette appears unremarkable  2 6 cm solid left lower lobe mass corresponding with the abdomen CT   4 cm cavitary right upper lobe mass  No acute disease  No effusion or pneumothorax  Left lateral 6th rib metastasis corresponding with the abdomen CT probable posterior left 5th rib metastasis  Impression  Right upper lobe cavitary mass, left lower lobe mass, and left rib metastases, some which are visible on the abdomen CT  Findings on the abdomen CT were reported to the emergency physician by Dr Regina Castillo  Workstation performed: [id number]    No results found for this or any previous visit  *I have reviewed all imaging reports listed above      Recent Cultures (last 7 days):     Results from last 7 days   Lab Units 11/09/22  0352   BLOOD CULTURE  No Growth at 24 hrs  No Growth at 24 hrs         Last 24 Hours Medication List:   Current Facility-Administered Medications   Medication Dose Route Frequency Provider Last Rate   • acetaminophen  650 mg Oral Q8H PRN Aureliano Koyanagi, PA-C     • heparin (porcine)  3-30 Units/kg/hr (Order-Specific) Intravenous Titrated Mandi Abbott MD 24 Units/kg/hr (11/11/22 0430)   • influenza vaccine  0 5 mL Intramuscular Prior to discharge Ashkan MAGUIRE Martin Robin PA-C     • insulin glargine  8 Units Subcutaneous HS Kay Castillo MD     • insulin lispro  1-5 Units Subcutaneous TID AC Jenny Asher PA-C     • insulin lispro  1-5 Units Subcutaneous HS Jenny Asher PA-C     • insulin lispro  3 Units Subcutaneous TID With Meals Jenny Asher PA-C     • lidocaine  1 patch Topical Daily Kay Castillo MD     • lidocaine  1 patch Topical Daily Kay Castillo MD     • nicotine  1 patch Transdermal Daily Jenny Asher PA-C     • OLANZapine  5 mg Oral HS Kay Castillo MD     • ondansetron  4 mg Intravenous Q6H PRN Jenny Asher PA-C     • oxyCODONE  10 mg Oral Q4H PRN Fermín Wright MD     • oxyCODONE  5 mg Oral Q6H PRN Fermín Wright MD     • piperacillin-tazobactam  3 375 g Intravenous Q6H Fermín Wright MD 3 375 g (11/10/22 1801)   • senna  1 tablet Oral HS PRN Jenny Asher PA-C          Today, Patient Was Seen By: Fermín Wright MD    ** Please Note: Dictation voice to text software may have been used in the creation of this document   **

## 2022-11-12 ENCOUNTER — APPOINTMENT (INPATIENT)
Dept: MRI IMAGING | Facility: HOSPITAL | Age: 62
End: 2022-11-12

## 2022-11-12 LAB
ALBUMIN SERPL BCP-MCNC: 1.8 G/DL (ref 3.5–5)
ALP SERPL-CCNC: 83 U/L (ref 46–116)
ALT SERPL W P-5'-P-CCNC: 14 U/L (ref 12–78)
ANION GAP SERPL CALCULATED.3IONS-SCNC: 7 MMOL/L (ref 4–13)
APTT PPP: 40 SECONDS (ref 23–37)
APTT PPP: 75 SECONDS (ref 23–37)
AST SERPL W P-5'-P-CCNC: 5 U/L (ref 5–45)
BASOPHILS # BLD AUTO: 0.03 THOUSANDS/ÂΜL (ref 0–0.1)
BASOPHILS NFR BLD AUTO: 0 % (ref 0–1)
BILIRUB SERPL-MCNC: 0.5 MG/DL (ref 0.2–1)
BUN SERPL-MCNC: 23 MG/DL (ref 5–25)
CALCIUM ALBUM COR SERPL-MCNC: 9.8 MG/DL (ref 8.3–10.1)
CALCIUM SERPL-MCNC: 8 MG/DL (ref 8.3–10.1)
CHLORIDE SERPL-SCNC: 98 MMOL/L (ref 96–108)
CO2 SERPL-SCNC: 27 MMOL/L (ref 21–32)
CREAT SERPL-MCNC: 1.27 MG/DL (ref 0.6–1.3)
EOSINOPHIL # BLD AUTO: 0.06 THOUSAND/ÂΜL (ref 0–0.61)
EOSINOPHIL NFR BLD AUTO: 1 % (ref 0–6)
ERYTHROCYTE [DISTWIDTH] IN BLOOD BY AUTOMATED COUNT: 15.9 % (ref 11.6–15.1)
GFR SERPL CREATININE-BSD FRML MDRD: 60 ML/MIN/1.73SQ M
GLUCOSE SERPL-MCNC: 174 MG/DL (ref 65–140)
GLUCOSE SERPL-MCNC: 246 MG/DL (ref 65–140)
GLUCOSE SERPL-MCNC: 324 MG/DL (ref 65–140)
GLUCOSE SERPL-MCNC: 330 MG/DL (ref 65–140)
GLUCOSE SERPL-MCNC: 334 MG/DL (ref 65–140)
HCT VFR BLD AUTO: 24.4 % (ref 36.5–49.3)
HGB BLD-MCNC: 8.2 G/DL (ref 12–17)
IMM GRANULOCYTES # BLD AUTO: 0.11 THOUSAND/UL (ref 0–0.2)
IMM GRANULOCYTES NFR BLD AUTO: 1 % (ref 0–2)
LYMPHOCYTES # BLD AUTO: 1.23 THOUSANDS/ÂΜL (ref 0.6–4.47)
LYMPHOCYTES NFR BLD AUTO: 10 % (ref 14–44)
MCH RBC QN AUTO: 32 PG (ref 26.8–34.3)
MCHC RBC AUTO-ENTMCNC: 33.6 G/DL (ref 31.4–37.4)
MCV RBC AUTO: 95 FL (ref 82–98)
MONOCYTES # BLD AUTO: 0.85 THOUSAND/ÂΜL (ref 0.17–1.22)
MONOCYTES NFR BLD AUTO: 7 % (ref 4–12)
NEUTROPHILS # BLD AUTO: 9.55 THOUSANDS/ÂΜL (ref 1.85–7.62)
NEUTS SEG NFR BLD AUTO: 81 % (ref 43–75)
NRBC BLD AUTO-RTO: 0 /100 WBCS
PLATELET # BLD AUTO: 52 THOUSANDS/UL (ref 149–390)
PMV BLD AUTO: 11.6 FL (ref 8.9–12.7)
POTASSIUM SERPL-SCNC: 3.8 MMOL/L (ref 3.5–5.3)
PROT SERPL-MCNC: 5.6 G/DL (ref 6.4–8.4)
RBC # BLD AUTO: 2.56 MILLION/UL (ref 3.88–5.62)
SODIUM SERPL-SCNC: 132 MMOL/L (ref 135–147)
WBC # BLD AUTO: 11.83 THOUSAND/UL (ref 4.31–10.16)

## 2022-11-12 RX ORDER — HYDROMORPHONE HCL/PF 1 MG/ML
0.5 SYRINGE (ML) INJECTION
Status: DISCONTINUED | OUTPATIENT
Start: 2022-11-12 | End: 2022-11-15

## 2022-11-12 RX ORDER — INSULIN LISPRO 100 [IU]/ML
5 INJECTION, SOLUTION INTRAVENOUS; SUBCUTANEOUS
Status: DISCONTINUED | OUTPATIENT
Start: 2022-11-12 | End: 2022-11-14

## 2022-11-12 RX ORDER — INSULIN GLARGINE 100 [IU]/ML
11 INJECTION, SOLUTION SUBCUTANEOUS
Status: DISCONTINUED | OUTPATIENT
Start: 2022-11-12 | End: 2022-11-14

## 2022-11-12 RX ADMIN — INSULIN LISPRO 2 UNITS: 100 INJECTION, SOLUTION INTRAVENOUS; SUBCUTANEOUS at 16:38

## 2022-11-12 RX ADMIN — HYDROMORPHONE HYDROCHLORIDE 0.5 MG: 1 INJECTION, SOLUTION INTRAMUSCULAR; INTRAVENOUS; SUBCUTANEOUS at 20:07

## 2022-11-12 RX ADMIN — INSULIN GLARGINE 11 UNITS: 100 INJECTION, SOLUTION SUBCUTANEOUS at 21:48

## 2022-11-12 RX ADMIN — ACETAMINOPHEN 650 MG: 325 TABLET, FILM COATED ORAL at 06:09

## 2022-11-12 RX ADMIN — HEPARIN SODIUM 26 UNITS/KG/HR: 10000 INJECTION, SOLUTION INTRAVENOUS at 02:54

## 2022-11-12 RX ADMIN — OLANZAPINE 5 MG: 5 TABLET, FILM COATED ORAL at 21:51

## 2022-11-12 RX ADMIN — INSULIN LISPRO 4 UNITS: 100 INJECTION, SOLUTION INTRAVENOUS; SUBCUTANEOUS at 08:21

## 2022-11-12 RX ADMIN — INSULIN LISPRO 3 UNITS: 100 INJECTION, SOLUTION INTRAVENOUS; SUBCUTANEOUS at 08:20

## 2022-11-12 RX ADMIN — OXYCODONE HYDROCHLORIDE 10 MG: 5 TABLET ORAL at 02:51

## 2022-11-12 RX ADMIN — OXYCODONE HYDROCHLORIDE 10 MG: 5 TABLET ORAL at 23:37

## 2022-11-12 RX ADMIN — INSULIN LISPRO 3 UNITS: 100 INJECTION, SOLUTION INTRAVENOUS; SUBCUTANEOUS at 13:11

## 2022-11-12 RX ADMIN — ACETAMINOPHEN 650 MG: 325 TABLET, FILM COATED ORAL at 10:55

## 2022-11-12 RX ADMIN — OXYCODONE HYDROCHLORIDE 10 MG: 5 TABLET ORAL at 16:22

## 2022-11-12 RX ADMIN — INSULIN LISPRO 5 UNITS: 100 INJECTION, SOLUTION INTRAVENOUS; SUBCUTANEOUS at 16:38

## 2022-11-12 RX ADMIN — INSULIN LISPRO 1 UNITS: 100 INJECTION, SOLUTION INTRAVENOUS; SUBCUTANEOUS at 21:49

## 2022-11-12 RX ADMIN — HYDROMORPHONE HYDROCHLORIDE 0.5 MG: 1 INJECTION, SOLUTION INTRAMUSCULAR; INTRAVENOUS; SUBCUTANEOUS at 11:59

## 2022-11-12 RX ADMIN — HEPARIN SODIUM 30 UNITS/KG/HR: 10000 INJECTION, SOLUTION INTRAVENOUS at 16:13

## 2022-11-12 RX ADMIN — INSULIN LISPRO 5 UNITS: 100 INJECTION, SOLUTION INTRAVENOUS; SUBCUTANEOUS at 13:09

## 2022-11-12 NOTE — PROGRESS NOTES
[place]  Progress Note Paige Desai 1960, 58 y o  male MRN: [id number]  Unit/Bed#: -01 Encounter: [id number]  Primary Care Provider: No primary care provider on file  Date and time admitted to hospital: 11/2/2022  9:13 PM    Severe protein-calorie malnutrition ([facility] [address] )  Assessment & Plan  Malnutrition Findings:   Adult Malnutrition type: Chronic illness  Adult Degree of Malnutrition: Other severe protein calorie malnutrition  Malnutrition Characteristics: Fat loss, Muscle loss                  360 Statement: Pt presents with severe protein calorie malnutrition as evidenced by orbital hollowing, temporal scooping and prominent clavicle bone protrusion  BMI Findings: Body mass index is 23 63 kg/m²  Anemia  Assessment & Plan  Severe anemia, progressive   Likely related to malignancy  No sx of bleeding  Transfused 1 unit with improvement       Hypokalemia  Assessment & Plan  Replete aggressively  resolved    Pedal edema  Assessment & Plan  · B/l pedal edema (R>L) for a couple of days   · Due to probable newly diagnosed metastatic cancer on CT scan, which demonstrated DVTs     Hyponatremia  Assessment & Plan  · resolved    Elevated glucose  Assessment & Plan  · Serum glucose on admission is 233   · No prior reported history of DM, however patient poor historian  ·   hemoglobin A1c 9 1  ·   Started on insulin regimen, will increase    Hypercalcemia  Assessment & Plan  · PTH low, hypercalcemia of malignancy is etiology   · apprec nephrology assistance  · S/p IVF, lasix, zometa  Slowly improving  Schizophrenia ([facility] [address] )  Assessment & Plan  · Self-reported history of schizophrenia   · Reportedly treated with clonazepam 100 mg HS, however patient reports non adherence with this   · Presented to the ED with acute psychosis requiring Haldol 5 mg IM x1  · Neuro- psychiatry was consulted because of question about patient's capacity    Patient did not seem to have insight into his condition  He does not have capacity to make medical decisions at this time  Abnormal CT scan, gallbladder  Assessment & Plan  · Distended gallbladder on CT scan   · Obtain right upper quadrant ultrasound patient also with elevated T bili and alk-phos  · Completed zosyn  No acute surgical needs by general surgery  No suspicion for cholecystitis per surg    SIRS (systemic inflammatory response syndrome) (HCC)  Assessment & Plan  · Sirs criteria met on admission:  Tachycardia and leukocytosis at 17 36 K without bandemia   · No severe sepsis criteria met   · Suspect reactive from suspected metastatic lung cancer as seen on CT scan   · UA without infection   · Procalcitonin elevated at 11 67-> 3    · ? Elevation secondary to infection or possible small cell lung cancer  · CT scan does show distended gallbladder and with elevated T bili and alk-phos, received IV Zosyn to cover for possible infection  Surgery suggests no acute surgical intervention  · Blood cultures x2, neg  · apprec ID input  · Fever curve improving  Observing off abx  Fevers felt 2/2 malignancy, dvt etc       * Suspected Primary malignant neoplasm of left lung metastatic to other site [facility])  Assessment & Plan  · Presents with generalized weakness, fatigue, and weight loss  · CT a/P: " 2 5 x 2 2 x 2 2 cm mass in the left lower lobe concerning for lung cancer  Lytic lesions within the left 6th rib and pelvis bones compatible with osseous metastasis "  · Longstanding smoking history  · Will ask pulm to evaluate  · Heme-Onc recommended tissue diagnosis with complete staging    They recommended patient will need anticoagulation which will be lifelong based on probable stage IV malignancy causing hypercoagulable state  · IR tissue biopsy performed - 11/8- prelim positive for metastatic lung adenocarcinoma  · D/w patient friend Kamla Joefarhat on 11/12, wishes to hear prognostic inpt/rx options from oncology  · Palliative care is on board VTE  Prophylaxis:   Pharmacologic: in place    Patient Centered Rounds: I have performed bedside rounds with nursing staff today  Discussions with Specialists or Other Care Team Provider: case management       Current Length of Stay: 9 day(s)    Current Patient Status: Inpatient        Code Status: Level 1 - Full Code      Subjective:   Pt without new complaints  No cp or sob    Patient is seen and examined at bedside  All other ROS are negative  Objective:     Vitals:   Temp (24hrs), Av 3 °F (37 4 °C), Min:98 4 °F (36 9 °C), Max:99 6 °F (37 6 °C)    Temp:  [98 4 °F (36 9 °C)-99 6 °F (37 6 °C)] 99 6 °F (37 6 °C)  HR:  [] 104  Resp:  [16-18] 18  BP: (117-141)/(67-81) 141/81  SpO2:  [93 %-96 %] 96 %  Body mass index is 23 63 kg/m²  Input and Output Summary (last 24 hours):        Intake/Output Summary (Last 24 hours) at 2022 0950  Last data filed at 2022 0915  Gross per 24 hour   Intake --   Output 875 ml   Net -875 ml       Physical Exam:       GEN: No acute distress, comfortable, chronically ill  HEEENT: No JVD, PERRLA, no scleral icterus  RESP: Lungs clear to auscultation bilaterally  CV: RRR, +s1/s2   ABD: SOFT NON TENDER, POSITIVE BOWEL SOUNDS, NO DISTENTION  PSYCH: CALM  NEURO mentatio nbaseline, NO FOCAL DEFICITS  SKIN: NO RASH  EXTREM: NO EDEMA    Additional Data:     Labs:    Results from last 7 days   Lab Units 22  0417   WBC Thousand/uL 11 74*   HEMOGLOBIN g/dL 7 7*   HEMATOCRIT % 23 3*   PLATELETS Thousands/uL 118*   NEUTROS PCT % 76*   LYMPHS PCT % 13*   MONOS PCT % 10   EOS PCT % 0     Results from last 7 days   Lab Units 22  0417   SODIUM mmol/L 137   POTASSIUM mmol/L 3 2*   CHLORIDE mmol/L 100   CO2 mmol/L 30   BUN mg/dL 23   CREATININE mg/dL 1 20   ANION GAP mmol/L 7   CALCIUM mg/dL 8 3   ALBUMIN g/dL 1 8*   TOTAL BILIRUBIN mg/dL 0 70   ALK PHOS U/L 83   ALT U/L 13   AST U/L 16   GLUCOSE RANDOM mg/dL 133     Results from last 7 days   Lab Units 11/11/22  0417   INR  1 08     Results from last 7 days   Lab Units 11/12/22  0748 11/11/22  2116 11/11/22  1558 11/11/22  1113 11/11/22  0724 11/10/22  2116 11/10/22  1557 11/10/22  1155 11/10/22  0803 11/09/22  2100 11/09/22  1646 11/09/22  1137   POC GLUCOSE mg/dl 334* 297* 274* 245* 139 138 164* 109 98 109 148* 158*                   * I Have Reviewed All Lab Data Listed Above  Imaging:     Results for orders placed during the hospital encounter of 11/02/22    XR chest portable    Narrative  CHEST    INDICATION:   Cough, tachycardia, weakness  COMPARISON:  Abdomen CT 11/3/2022, CXR 3/20/2006  EXAM PERFORMED/VIEWS:  XR CHEST PORTABLE      FINDINGS:    Cardiomediastinal silhouette appears unremarkable  2 6 cm solid left lower lobe mass corresponding with the abdomen CT   4 cm cavitary right upper lobe mass  No acute disease  No effusion or pneumothorax  Left lateral 6th rib metastasis corresponding with the abdomen CT probable posterior left 5th rib metastasis  Impression  Right upper lobe cavitary mass, left lower lobe mass, and left rib metastases, some which are visible on the abdomen CT  Findings on the abdomen CT were reported to the emergency physician by Dr Bassam Fairchild  Workstation performed: [id number]    No results found for this or any previous visit  *I have reviewed all imaging reports listed above      Recent Cultures (last 7 days):     Results from last 7 days   Lab Units 11/09/22  0352   BLOOD CULTURE  No Growth at 48 hrs  No Growth at 48 hrs         Last 24 Hours Medication List:   Current Facility-Administered Medications   Medication Dose Route Frequency Provider Last Rate   • acetaminophen  650 mg Oral Q8H PRN Comfort Hernandez PA-C     • heparin (porcine)  3-30 Units/kg/hr (Order-Specific) Intravenous Titrated Suzie Menjivar MD 26 Units/kg/hr (11/12/22 0254)   • influenza vaccine  0 5 mL Intramuscular Prior to discharge Marin MAGUIRE Joan Viera PA-C     • insulin glargine  11 Units Subcutaneous HS Eva Matos MD     • insulin lispro  1-5 Units Subcutaneous TID AC Booker Coronel PA-C     • insulin lispro  1-5 Units Subcutaneous HS Booker Coronel PA-C     • insulin lispro  5 Units Subcutaneous TID With Meals Eva Matos MD     • lidocaine  1 patch Topical Daily Pepito Gilliam MD     • lidocaine  1 patch Topical Daily Pepito Gilliam MD     • nicotine  1 patch Transdermal Daily Booker Coronel PA-C     • OLANZapine  5 mg Oral HS Pepito Gilliam MD     • ondansetron  4 mg Intravenous Q6H PRN Booker Coronel PA-C     • oxyCODONE  10 mg Oral Q4H PRN Eva Matos MD     • oxyCODONE  5 mg Oral Q6H PRN Eva Matos MD     • senna  1 tablet Oral HS PRN Booker Coronel PA-C          Today, Patient Was Seen By: Eva Matos MD    ** Please Note: Dictation voice to text software may have been used in the creation of this document   **

## 2022-11-12 NOTE — ASSESSMENT & PLAN NOTE
Severe anemia, progressive   Likely related to malignancy  No sx of bleeding  Transfused 1 unit with improvement

## 2022-11-12 NOTE — PLAN OF CARE
Problem: Nutrition/Hydration-ADULT  Goal: Nutrient/Hydration intake appropriate for improving, restoring or maintaining nutritional needs  Description: Monitor and assess patient's nutrition/hydration status for malnutrition  Collaborate with interdisciplinary team and initiate plan and interventions as ordered  Monitor patient's weight and dietary intake as ordered or per policy  Utilize nutrition screening tool and intervene as necessary  Determine patient's food preferences and provide high-protein, high-caloric foods as appropriate       INTERVENTIONS:  - Monitor oral intake, urinary output, labs, and treatment plans  - Assess nutrition and hydration status and recommend course of action  - Evaluate amount of meals eaten  - Assist patient with eating if necessary   - Allow adequate time for meals  - Recommend/ encourage appropriate diets, oral nutritional supplements, and vitamin/mineral supplements  - Order, calculate, and assess calorie counts as needed  - Recommend, monitor, and adjust tube feedings and TPN/PPN based on assessed needs  - Assess need for intravenous fluids  - Provide specific nutrition/hydration education as appropriate  - Include patient/family/caregiver in decisions related to nutrition  Outcome: Progressing     Problem: MOBILITY - ADULT  Goal: Maintain or return to baseline ADL function  Description: INTERVENTIONS:  -  Assess patient's ability to carry out ADLs; assess patient's baseline for ADL function and identify physical deficits which impact ability to perform ADLs (bathing, care of mouth/teeth, toileting, grooming, dressing, etc )  - Assess/evaluate cause of self-care deficits   - Assess range of motion  - Assess patient's mobility; develop plan if impaired  - Assess patient's need for assistive devices and provide as appropriate  - Encourage maximum independence but intervene and supervise when necessary  - Involve family in performance of ADLs  - Assess for home care needs following discharge   - Consider OT consult to assist with ADL evaluation and planning for discharge  - Provide patient education as appropriate  Outcome: Progressing  Goal: Maintains/Returns to pre admission functional level  Description: INTERVENTIONS:  - Perform BMAT or MOVE assessment daily    - Set and communicate daily mobility goal to care team and patient/family/caregiver  - Collaborate with rehabilitation services on mobility goals if consulted  - Perform Range of Motion 2 times a day  - Reposition patient every 2 hours    - Dangle patient 2 times a day  - Stand patient 2 times a day  - Ambulate patient 2 times a day  - Out of bed to chair 2 times a day   - Out of bed for meals 2 times a day  - Out of bed for toileting  - Record patient progress and toleration of activity level   Outcome: Progressing     Problem: Prexisting or High Potential for Compromised Skin Integrity  Goal: Skin integrity is maintained or improved  Description: INTERVENTIONS:  - Identify patients at risk for skin breakdown  - Assess and monitor skin integrity  - Assess and monitor nutrition and hydration status  - Monitor labs   - Assess for incontinence   - Turn and reposition patient  - Assist with mobility/ambulation  - Relieve pressure over bony prominences  - Avoid friction and shearing  - Provide appropriate hygiene as needed including keeping skin clean and dry  - Evaluate need for skin moisturizer/barrier cream  - Collaborate with interdisciplinary team   - Patient/family teaching  - Consider wound care consult   Outcome: Progressing     Problem: PAIN - ADULT  Goal: Verbalizes/displays adequate comfort level or baseline comfort level  Description: Interventions:  - Encourage patient to monitor pain and request assistance  - Assess pain using appropriate pain scale  - Administer analgesics based on type and severity of pain and evaluate response  - Implement non-pharmacological measures as appropriate and evaluate response  - Consider cultural and social influences on pain and pain management  - Notify physician/advanced practitioner if interventions unsuccessful or patient reports new pain  Outcome: Progressing     Problem: INFECTION - ADULT  Goal: Absence or prevention of progression during hospitalization  Description: INTERVENTIONS:  - Assess and monitor for signs and symptoms of infection  - Monitor lab/diagnostic results  - Monitor all insertion sites, i e  indwelling lines, tubes, and drains  - Monitor endotracheal if appropriate and nasal secretions for changes in amount and color  - [place] appropriate cooling/warming therapies per order  - Administer medications as ordered  - Instruct and encourage patient and family to use good hand hygiene technique  - Identify and instruct in appropriate isolation precautions for identified infection/condition  Outcome: Progressing  Goal: Absence of fever/infection during neutropenic period  Description: INTERVENTIONS:  - Monitor WBC    Outcome: Progressing     Problem: SAFETY ADULT  Goal: Maintain or return to baseline ADL function  Description: INTERVENTIONS:  -  Assess patient's ability to carry out ADLs; assess patient's baseline for ADL function and identify physical deficits which impact ability to perform ADLs (bathing, care of mouth/teeth, toileting, grooming, dressing, etc )  - Assess/evaluate cause of self-care deficits   - Assess range of motion  - Assess patient's mobility; develop plan if impaired  - Assess patient's need for assistive devices and provide as appropriate  - Encourage maximum independence but intervene and supervise when necessary  - Involve family in performance of ADLs  - Assess for home care needs following discharge   - Consider OT consult to assist with ADL evaluation and planning for discharge  - Provide patient education as appropriate  Outcome: Progressing  Goal: Maintains/Returns to pre admission functional level  Description: INTERVENTIONS:  - Perform BMAT or MOVE assessment daily    - Set and communicate daily mobility goal to care team and patient/family/caregiver  - Collaborate with rehabilitation services on mobility goals if consulted  - Perform Range of Motion 2 times a day  - Reposition patient every 2 hours    - Dangle patient 2 times a day  - Stand patient 2 times a day  - Ambulate patient 2 times a day  - Out of bed to chair 2 times a day   - Out of bed for meals 2 times a day  - Out of bed for toileting  - Record patient progress and toleration of activity level   Outcome: Progressing  Goal: Patient will remain free of falls  Description: INTERVENTIONS:  - Educate patient/family on patient safety including physical limitations  - Instruct patient to call for assistance with activity   - Consult OT/PT to assist with strengthening/mobility   - Keep Call bell within reach  - Keep bed low and locked with side rails adjusted as appropriate  - Keep care items and personal belongings within reach  - Initiate and maintain comfort rounds  - Make Fall Risk Sign visible to staff  - Offer Toileting every 2 Hours, in advance of need  - Initiate/Maintain bedalarm  - Obtain necessary fall risk management equipment: yellow socks, condom catheter, yellow bracelet, bed alarm  - Apply yellow socks and bracelet for high fall risk patients  - Consider moving patient to room near nurses station  Outcome: Progressing     Problem: DISCHARGE PLANNING  Goal: Discharge to home or other facility with appropriate resources  Description: INTERVENTIONS:  - Identify barriers to discharge w/patient and caregiver  - Arrange for needed discharge resources and transportation as appropriate  - Identify discharge learning needs (meds, wound care, etc )  - Arrange for interpretive services to assist at discharge as needed  - Refer to Case Management Department for coordinating discharge planning if the patient needs post-hospital services based on physician/advanced practitioner order or complex needs related to functional status, cognitive ability, or social support system  Outcome: Progressing     Problem: Knowledge Deficit  Goal: Patient/family/caregiver demonstrates understanding of disease process, treatment plan, medications, and discharge instructions  Description: Complete learning assessment and assess knowledge base    Interventions:  - Provide teaching at level of understanding  - Provide teaching via preferred learning methods  Outcome: Progressing     Problem: Potential for Falls  Goal: Patient will remain free of falls  Description: INTERVENTIONS:  - Educate patient/family on patient safety including physical limitations  - Instruct patient to call for assistance with activity   - Consult OT/PT to assist with strengthening/mobility   - Keep Call bell within reach  - Keep bed low and locked with side rails adjusted as appropriate  - Keep care items and personal belongings within reach  - Initiate and maintain comfort rounds  - Make Fall Risk Sign visible to staff  - Offer Toileting every 2 Hours, in advance of need  - Initiate/Maintain bed alarm  - Obtain necessary fall risk management equipment: yellow socks, yellow bracelet, condom catheter, bed alarm  - Apply yellow socks and bracelet for high fall risk patients  - Consider moving patient to room near nurses station  Outcome: Progressing     Problem: CARDIOVASCULAR - ADULT  Goal: Maintains optimal cardiac output and hemodynamic stability  Description: INTERVENTIONS:  - Monitor I/O, vital signs and rhythm  - Monitor for S/S and trends of decreased cardiac output  - Administer and titrate ordered vasoactive medications to optimize hemodynamic stability  - Assess quality of pulses, skin color and temperature  - Assess for signs of decreased coronary artery perfusion  - Instruct patient to report change in severity of symptoms  Outcome: Progressing  Goal: Absence of cardiac dysrhythmias or at baseline rhythm  Description: INTERVENTIONS:  - Continuous cardiac monitoring, vital signs, obtain 12 lead EKG if ordered  - Administer antiarrhythmic and heart rate control medications as ordered  - Monitor electrolytes and administer replacement therapy as ordered  Outcome: Progressing     Problem: RESPIRATORY - ADULT  Goal: Achieves optimal ventilation and oxygenation  Description: INTERVENTIONS:  - Assess for changes in respiratory status  - Assess for changes in mentation and behavior  - Position to facilitate oxygenation and minimize respiratory effort  - Oxygen administered by appropriate delivery if ordered  - Initiate smoking cessation education as indicated  - Encourage broncho-pulmonary hygiene including cough, deep breathe, Incentive Spirometry  - Assess the need for suctioning and aspirate as needed  - Assess and instruct to report SOB or any respiratory difficulty  - Respiratory Therapy support as indicated  Outcome: Progressing     Problem: METABOLIC, FLUID AND ELECTROLYTES - ADULT  Goal: Electrolytes maintained within normal limits  Description: INTERVENTIONS:  - Monitor labs and assess patient for signs and symptoms of electrolyte imbalances  - Administer electrolyte replacement as ordered  - Monitor response to electrolyte replacements, including repeat lab results as appropriate  - Instruct patient on fluid and nutrition as appropriate  Outcome: Progressing  Goal: Fluid balance maintained  Description: INTERVENTIONS:  - Monitor labs   - Monitor I/O and WT  - Instruct patient on fluid and nutrition as appropriate  - Assess for signs & symptoms of volume excess or deficit  Outcome: Progressing  Goal: Glucose maintained within target range  Description: INTERVENTIONS:  - Monitor Blood Glucose as ordered  - Assess for signs and symptoms of hyperglycemia and hypoglycemia  - Administer ordered medications to maintain glucose within target range  - Assess nutritional intake and initiate nutrition service referral as needed  Outcome: Progressing     Problem: SKIN/TISSUE INTEGRITY - ADULT  Goal: Skin Integrity remains intact(Skin Breakdown Prevention)  Description: Assess:  -Perform Ashish assessment every shift  -Clean and moisturize skin every day and after every incontinent epsidoe  -Inspect skin when repositioning, toileting, and assisting with ADLS  -Assess under medical devices such as masimo every shift  -Assess extremities for adequate circulation and sensation     Bed Management:  -Have minimal linens on bed & keep smooth, unwrinkled  -Change linens as needed when moist or perspiring  -Avoid sitting or lying in one position for more than 4 hours while in bed  -Keep HOB at 30degrees     Toileting:  -Offer bedside commode  -Assess for incontinence every 2 hours  -Use incontinent care products after each incontinent episode    Activity:  -Mobilize patient 4 times a day in bed d/t weakness  -Encourage activity and walks on unit  -Encourage or provide ROM exercises   -Turn and reposition patient every 2 Hours  -Use appropriate equipment to lift or move patient in bed  -Instruct/ Assist with weight shifting every 2 hours when out of bed in chair  -Consider limitation of chair time 4 hour intervals    Skin Care:  -Avoid use of baby powder, tape, friction and shearing, hot water or constrictive clothing  -Relieve pressure over bony prominences using pillows, wedges, waffle cusion  -Do not massage red bony areas    Next Steps:  -Teach patient strategies to minimize risks such as weight shifting   -Consider consults to  interdisciplinary teams such as wound care   Outcome: Progressing     Problem: HEMATOLOGIC - ADULT  Goal: Maintains hematologic stability  Description: INTERVENTIONS  - Assess for signs and symptoms of bleeding or hemorrhage  - Monitor labs  - Administer supportive blood products/factors as ordered and appropriate  Outcome: Progressing     Problem: MUSCULOSKELETAL - ADULT  Goal: Maintain or return mobility to safest level of function  Description: INTERVENTIONS:  - Assess patient's ability to carry out ADLs; assess patient's baseline for ADL function and identify physical deficits which impact ability to perform ADLs (bathing, care of mouth/teeth, toileting, grooming, dressing, etc )  - Assess/evaluate cause of self-care deficits   - Assess range of motion  - Assess patient's mobility  - Assess patient's need for assistive devices and provide as appropriate  - Encourage maximum independence but intervene and supervise when necessary  - Involve family in performance of ADLs  - Assess for home care needs following discharge   - Consider OT consult to assist with ADL evaluation and planning for discharge  - Provide patient education as appropriate  Outcome: Progressing

## 2022-11-12 NOTE — ASSESSMENT & PLAN NOTE
· Presents with generalized weakness, fatigue, and weight loss  · CT a/P: " 2 5 x 2 2 x 2 2 cm mass in the left lower lobe concerning for lung cancer  Lytic lesions within the left 6th rib and pelvis bones compatible with osseous metastasis "  · Longstanding smoking history  · Will ask pulm to evaluate  · Heme-Onc recommended tissue diagnosis with complete staging    They recommended patient will need anticoagulation which will be lifelong based on probable stage IV malignancy causing hypercoagulable state  · IR tissue biopsy performed - 11/8- prelim positive for metastatic lung adenocarcinoma  · D/w patient friend Maryetta Meigs on 11/12, wishes to hear prognostic inpt/rx options from oncology  · Palliative care is on board

## 2022-11-12 NOTE — ASSESSMENT & PLAN NOTE
· Serum glucose on admission is 233   · No prior reported history of DM, however patient poor historian  ·   hemoglobin A1c 9 1  ·   Started on insulin regimen, will increase

## 2022-11-12 NOTE — ASSESSMENT & PLAN NOTE
· Sirs criteria met on admission:  Tachycardia and leukocytosis at 17 36 K without bandemia   · No severe sepsis criteria met   · Suspect reactive from suspected metastatic lung cancer as seen on CT scan   · UA without infection   · Procalcitonin elevated at 11 67-> 3    · ? Elevation secondary to infection or possible small cell lung cancer  · CT scan does show distended gallbladder and with elevated T bili and alk-phos, received IV Zosyn to cover for possible infection  Surgery suggests no acute surgical intervention  · Blood cultures x2, neg  · apprec ID input  · Fever curve improving  Observing off abx   Fevers felt 2/2 malignancy, dvt etc

## 2022-11-12 NOTE — CONSULTS
Medical Oncology is in receipt of consult  We are already consulted on patient's case  Please see original consult note dated 11/3/22 and follow up note dated on 11/7/22  Pathology was pending, now resulted with final diagnosis consistent with metastatic adenocarcinoma of the lung  This is a stage 4 lung cancer  Not curable  If patient is interested, treatment would include palliative systemic chemotherapy  Per Neuro-psychiatry, patient does not have capacity to make decisions  Would recommend MRI Brain to rule out metastatic disease    Will discuss prognosis, treatment options with patient's identified surrogate decision maker on Monday when on campus  [employer]  [facility]  Hematology Oncology

## 2022-11-13 ENCOUNTER — APPOINTMENT (INPATIENT)
Dept: MRI IMAGING | Facility: HOSPITAL | Age: 62
End: 2022-11-13

## 2022-11-13 PROBLEM — D69.6 THROMBOCYTOPENIA (HCC): Status: ACTIVE | Noted: 2022-11-13

## 2022-11-13 LAB
ALBUMIN SERPL BCP-MCNC: 1.9 G/DL (ref 3.5–5)
ALBUMIN SERPL BCP-MCNC: 2 G/DL (ref 3.5–5)
ALP SERPL-CCNC: 82 U/L (ref 46–116)
ALP SERPL-CCNC: 86 U/L (ref 46–116)
ALT SERPL W P-5'-P-CCNC: 14 U/L (ref 12–78)
ALT SERPL W P-5'-P-CCNC: 16 U/L (ref 12–78)
ANION GAP SERPL CALCULATED.3IONS-SCNC: 6 MMOL/L (ref 4–13)
APTT PPP: 34 SECONDS (ref 23–37)
APTT PPP: 73 SECONDS (ref 23–37)
AST SERPL W P-5'-P-CCNC: 12 U/L (ref 5–45)
AST SERPL W P-5'-P-CCNC: 15 U/L (ref 5–45)
BASOPHILS # BLD AUTO: 0.02 THOUSANDS/ÂΜL (ref 0–0.1)
BASOPHILS # BLD AUTO: 0.02 THOUSANDS/ÂΜL (ref 0–0.1)
BASOPHILS NFR BLD AUTO: 0 % (ref 0–1)
BASOPHILS NFR BLD AUTO: 0 % (ref 0–1)
BILIRUB DIRECT SERPL-MCNC: 0.15 MG/DL (ref 0–0.2)
BILIRUB SERPL-MCNC: 0.6 MG/DL (ref 0.2–1)
BILIRUB SERPL-MCNC: 0.6 MG/DL (ref 0.2–1)
BUN SERPL-MCNC: 18 MG/DL (ref 5–25)
CALCIUM ALBUM COR SERPL-MCNC: 9.9 MG/DL (ref 8.3–10.1)
CALCIUM SERPL-MCNC: 8.2 MG/DL (ref 8.3–10.1)
CHLORIDE SERPL-SCNC: 102 MMOL/L (ref 96–108)
CO2 SERPL-SCNC: 30 MMOL/L (ref 21–32)
CREAT SERPL-MCNC: 1.02 MG/DL (ref 0.6–1.3)
EOSINOPHIL # BLD AUTO: 0.04 THOUSAND/ÂΜL (ref 0–0.61)
EOSINOPHIL # BLD AUTO: 0.05 THOUSAND/ÂΜL (ref 0–0.61)
EOSINOPHIL NFR BLD AUTO: 0 % (ref 0–6)
EOSINOPHIL NFR BLD AUTO: 0 % (ref 0–6)
ERYTHROCYTE [DISTWIDTH] IN BLOOD BY AUTOMATED COUNT: 16 % (ref 11.6–15.1)
ERYTHROCYTE [DISTWIDTH] IN BLOOD BY AUTOMATED COUNT: 16.1 % (ref 11.6–15.1)
GFR SERPL CREATININE-BSD FRML MDRD: 78 ML/MIN/1.73SQ M
GLUCOSE SERPL-MCNC: 152 MG/DL (ref 65–140)
GLUCOSE SERPL-MCNC: 163 MG/DL (ref 65–140)
GLUCOSE SERPL-MCNC: 166 MG/DL (ref 65–140)
GLUCOSE SERPL-MCNC: 176 MG/DL (ref 65–140)
GLUCOSE SERPL-MCNC: 236 MG/DL (ref 65–140)
HCT VFR BLD AUTO: 24.7 % (ref 36.5–49.3)
HCT VFR BLD AUTO: 27.6 % (ref 36.5–49.3)
HGB BLD-MCNC: 8.3 G/DL (ref 12–17)
HGB BLD-MCNC: 9.3 G/DL (ref 12–17)
IMM GRANULOCYTES # BLD AUTO: 0.15 THOUSAND/UL (ref 0–0.2)
IMM GRANULOCYTES # BLD AUTO: 0.18 THOUSAND/UL (ref 0–0.2)
IMM GRANULOCYTES NFR BLD AUTO: 1 % (ref 0–2)
IMM GRANULOCYTES NFR BLD AUTO: 1 % (ref 0–2)
INR PPP: 1.14 (ref 0.84–1.19)
LYMPHOCYTES # BLD AUTO: 0.98 THOUSANDS/ÂΜL (ref 0.6–4.47)
LYMPHOCYTES # BLD AUTO: 1.34 THOUSANDS/ÂΜL (ref 0.6–4.47)
LYMPHOCYTES NFR BLD AUTO: 11 % (ref 14–44)
LYMPHOCYTES NFR BLD AUTO: 6 % (ref 14–44)
MCH RBC QN AUTO: 31.4 PG (ref 26.8–34.3)
MCH RBC QN AUTO: 31.8 PG (ref 26.8–34.3)
MCHC RBC AUTO-ENTMCNC: 33.6 G/DL (ref 31.4–37.4)
MCHC RBC AUTO-ENTMCNC: 33.7 G/DL (ref 31.4–37.4)
MCV RBC AUTO: 93 FL (ref 82–98)
MCV RBC AUTO: 95 FL (ref 82–98)
MONOCYTES # BLD AUTO: 0.94 THOUSAND/ÂΜL (ref 0.17–1.22)
MONOCYTES # BLD AUTO: 1.17 THOUSAND/ÂΜL (ref 0.17–1.22)
MONOCYTES NFR BLD AUTO: 8 % (ref 4–12)
MONOCYTES NFR BLD AUTO: 8 % (ref 4–12)
NEUTROPHILS # BLD AUTO: 10.12 THOUSANDS/ÂΜL (ref 1.85–7.62)
NEUTROPHILS # BLD AUTO: 13.1 THOUSANDS/ÂΜL (ref 1.85–7.62)
NEUTS SEG NFR BLD AUTO: 80 % (ref 43–75)
NEUTS SEG NFR BLD AUTO: 85 % (ref 43–75)
NRBC BLD AUTO-RTO: 0 /100 WBCS
NRBC BLD AUTO-RTO: 0 /100 WBCS
PLATELET # BLD AUTO: 32 THOUSANDS/UL (ref 149–390)
PLATELET # BLD AUTO: 46 THOUSANDS/UL (ref 149–390)
PMV BLD AUTO: 11.3 FL (ref 8.9–12.7)
PMV BLD AUTO: 11.9 FL (ref 8.9–12.7)
POTASSIUM SERPL-SCNC: 3.5 MMOL/L (ref 3.5–5.3)
PROT SERPL-MCNC: 5.5 G/DL (ref 6.4–8.4)
PROT SERPL-MCNC: 5.8 G/DL (ref 6.4–8.4)
PROTHROMBIN TIME: 15.4 SECONDS (ref 11.6–14.5)
RBC # BLD AUTO: 2.61 MILLION/UL (ref 3.88–5.62)
RBC # BLD AUTO: 2.96 MILLION/UL (ref 3.88–5.62)
SODIUM SERPL-SCNC: 138 MMOL/L (ref 135–147)
WBC # BLD AUTO: 12.61 THOUSAND/UL (ref 4.31–10.16)
WBC # BLD AUTO: 15.5 THOUSAND/UL (ref 4.31–10.16)

## 2022-11-13 RX ORDER — ARGATROBAN 1 MG/ML
.1-3 INJECTION INTRAVENOUS
Status: DISCONTINUED | OUTPATIENT
Start: 2022-11-13 | End: 2022-11-15

## 2022-11-13 RX ADMIN — APIXABAN 10 MG: 5 TABLET, FILM COATED ORAL at 08:32

## 2022-11-13 RX ADMIN — OXYCODONE HYDROCHLORIDE 10 MG: 5 TABLET ORAL at 22:34

## 2022-11-13 RX ADMIN — HEPARIN SODIUM 30 UNITS/KG/HR: 10000 INJECTION, SOLUTION INTRAVENOUS at 06:27

## 2022-11-13 RX ADMIN — INSULIN LISPRO 5 UNITS: 100 INJECTION, SOLUTION INTRAVENOUS; SUBCUTANEOUS at 12:54

## 2022-11-13 RX ADMIN — ARGATROBAN 2 MCG/KG/MIN: 1 INJECTION, SOLUTION INTRAVENOUS at 20:38

## 2022-11-13 RX ADMIN — OLANZAPINE 5 MG: 5 TABLET, FILM COATED ORAL at 21:16

## 2022-11-13 RX ADMIN — NICOTINE 1 PATCH: 21 PATCH, EXTENDED RELEASE TRANSDERMAL at 08:33

## 2022-11-13 RX ADMIN — INSULIN LISPRO 5 UNITS: 100 INJECTION, SOLUTION INTRAVENOUS; SUBCUTANEOUS at 18:17

## 2022-11-13 RX ADMIN — OXYCODONE HYDROCHLORIDE 10 MG: 5 TABLET ORAL at 08:30

## 2022-11-13 RX ADMIN — OXYCODONE HYDROCHLORIDE 10 MG: 5 TABLET ORAL at 12:57

## 2022-11-13 RX ADMIN — INSULIN LISPRO 2 UNITS: 100 INJECTION, SOLUTION INTRAVENOUS; SUBCUTANEOUS at 18:17

## 2022-11-13 RX ADMIN — INSULIN LISPRO 5 UNITS: 100 INJECTION, SOLUTION INTRAVENOUS; SUBCUTANEOUS at 08:27

## 2022-11-13 RX ADMIN — INSULIN LISPRO 1 UNITS: 100 INJECTION, SOLUTION INTRAVENOUS; SUBCUTANEOUS at 21:16

## 2022-11-13 RX ADMIN — INSULIN LISPRO 1 UNITS: 100 INJECTION, SOLUTION INTRAVENOUS; SUBCUTANEOUS at 08:29

## 2022-11-13 RX ADMIN — INSULIN LISPRO 1 UNITS: 100 INJECTION, SOLUTION INTRAVENOUS; SUBCUTANEOUS at 12:54

## 2022-11-13 RX ADMIN — INSULIN GLARGINE 11 UNITS: 100 INJECTION, SOLUTION SUBCUTANEOUS at 21:15

## 2022-11-13 RX ADMIN — GADOBUTROL 6 ML: 604.72 INJECTION INTRAVENOUS at 11:49

## 2022-11-13 NOTE — ASSESSMENT & PLAN NOTE
D/w hematology on call  Will transition to argatroban  Gtt until HIT is ruled out  Monitor platelets

## 2022-11-13 NOTE — PLAN OF CARE
Problem: Nutrition/Hydration-ADULT  Goal: Nutrient/Hydration intake appropriate for improving, restoring or maintaining nutritional needs  Description: Monitor and assess patient's nutrition/hydration status for malnutrition  Collaborate with interdisciplinary team and initiate plan and interventions as ordered  Monitor patient's weight and dietary intake as ordered or per policy  Utilize nutrition screening tool and intervene as necessary  Determine patient's food preferences and provide high-protein, high-caloric foods as appropriate       INTERVENTIONS:  - Monitor oral intake, urinary output, labs, and treatment plans  - Assess nutrition and hydration status and recommend course of action  - Evaluate amount of meals eaten  - Assist patient with eating if necessary   - Allow adequate time for meals  - Recommend/ encourage appropriate diets, oral nutritional supplements, and vitamin/mineral supplements  - Order, calculate, and assess calorie counts as needed  - Recommend, monitor, and adjust tube feedings and TPN/PPN based on assessed needs  - Assess need for intravenous fluids  - Provide specific nutrition/hydration education as appropriate  - Include patient/family/caregiver in decisions related to nutrition  Outcome: Progressing     Problem: MOBILITY - ADULT  Goal: Maintain or return to baseline ADL function  Description: INTERVENTIONS:  -  Assess patient's ability to carry out ADLs; assess patient's baseline for ADL function and identify physical deficits which impact ability to perform ADLs (bathing, care of mouth/teeth, toileting, grooming, dressing, etc )  - Assess/evaluate cause of self-care deficits   - Assess range of motion  - Assess patient's mobility; develop plan if impaired  - Assess patient's need for assistive devices and provide as appropriate  - Encourage maximum independence but intervene and supervise when necessary  - Involve family in performance of ADLs  - Assess for home care needs following discharge   - Consider OT consult to assist with ADL evaluation and planning for discharge  - Provide patient education as appropriate  Outcome: Progressing  Goal: Maintains/Returns to pre admission functional level  Description: INTERVENTIONS:  - Perform BMAT or MOVE assessment daily    - Set and communicate daily mobility goal to care team and patient/family/caregiver  - Collaborate with rehabilitation services on mobility goals if consulted  - Perform Range of Motion 3 times a day  - Reposition patient every 2 hours    - Dangle patient 3 times a day  - Stand patient 3 times a day  - Ambulate patient 3 times a day  - Out of bed to chair 3 times a day   - Out of bed for meals 3 times a day  - Out of bed for toileting  - Record patient progress and toleration of activity level   Outcome: Progressing     Problem: Prexisting or High Potential for Compromised Skin Integrity  Goal: Skin integrity is maintained or improved  Description: INTERVENTIONS:  - Identify patients at risk for skin breakdown  - Assess and monitor skin integrity  - Assess and monitor nutrition and hydration status  - Monitor labs   - Assess for incontinence   - Turn and reposition patient  - Assist with mobility/ambulation  - Relieve pressure over bony prominences  - Avoid friction and shearing  - Provide appropriate hygiene as needed including keeping skin clean and dry  - Evaluate need for skin moisturizer/barrier cream  - Collaborate with interdisciplinary team   - Patient/family teaching  - Consider wound care consult   Outcome: Progressing     Problem: PAIN - ADULT  Goal: Verbalizes/displays adequate comfort level or baseline comfort level  Description: Interventions:  - Encourage patient to monitor pain and request assistance  - Assess pain using appropriate pain scale  - Administer analgesics based on type and severity of pain and evaluate response  - Implement non-pharmacological measures as appropriate and evaluate response  - Consider cultural and social influences on pain and pain management  - Notify physician/advanced practitioner if interventions unsuccessful or patient reports new pain  Outcome: Progressing     Problem: INFECTION - ADULT  Goal: Absence or prevention of progression during hospitalization  Description: INTERVENTIONS:  - Assess and monitor for signs and symptoms of infection  - Monitor lab/diagnostic results  - Monitor all insertion sites, i e  indwelling lines, tubes, and drains  - Monitor endotracheal if appropriate and nasal secretions for changes in amount and color  - [place] appropriate cooling/warming therapies per order  - Administer medications as ordered  - Instruct and encourage patient and family to use good hand hygiene technique  - Identify and instruct in appropriate isolation precautions for identified infection/condition  Outcome: Progressing  Goal: Absence of fever/infection during neutropenic period  Description: INTERVENTIONS:  - Monitor WBC    Outcome: Progressing     Problem: SAFETY ADULT  Goal: Maintain or return to baseline ADL function  Description: INTERVENTIONS:  -  Assess patient's ability to carry out ADLs; assess patient's baseline for ADL function and identify physical deficits which impact ability to perform ADLs (bathing, care of mouth/teeth, toileting, grooming, dressing, etc )  - Assess/evaluate cause of self-care deficits   - Assess range of motion  - Assess patient's mobility; develop plan if impaired  - Assess patient's need for assistive devices and provide as appropriate  - Encourage maximum independence but intervene and supervise when necessary  - Involve family in performance of ADLs  - Assess for home care needs following discharge   - Consider OT consult to assist with ADL evaluation and planning for discharge  - Provide patient education as appropriate  Outcome: Progressing  Goal: Maintains/Returns to pre admission functional level  Description: INTERVENTIONS:  - Perform BMAT or MOVE assessment daily    - Set and communicate daily mobility goal to care team and patient/family/caregiver  - Collaborate with rehabilitation services on mobility goals if consulted  - Perform Range of Motion 3 times a day  - Reposition patient every 2 hours    - Dangle patient 3 times a day  - Stand patient 3 times a day  - Ambulate patient 3 times a day  - Out of bed to chair 3 times a day   - Out of bed for meals 3 times a day  - Out of bed for toileting  - Record patient progress and toleration of activity level   Outcome: Progressing  Goal: Patient will remain free of falls  Description: INTERVENTIONS:  - Educate patient/family on patient safety including physical limitations  - Instruct patient to call for assistance with activity   - Consult OT/PT to assist with strengthening/mobility   - Keep Call bell within reach  - Keep bed low and locked with side rails adjusted as appropriate  - Keep care items and personal belongings within reach  - Initiate and maintain comfort rounds  - Make Fall Risk Sign visible to staff  - Offer Toileting every 2 Hours, in advance of need  - Initiate/Maintain bed alarm  - Obtain necessary fall risk management equipment:   - Apply yellow socks and bracelet for high fall risk patients  - Consider moving patient to room near nurses station  Outcome: Progressing     Problem: DISCHARGE PLANNING  Goal: Discharge to home or other facility with appropriate resources  Description: INTERVENTIONS:  - Identify barriers to discharge w/patient and caregiver  - Arrange for needed discharge resources and transportation as appropriate  - Identify discharge learning needs (meds, wound care, etc )  - Arrange for interpretive services to assist at discharge as needed  - Refer to Case Management Department for coordinating discharge planning if the patient needs post-hospital services based on physician/advanced practitioner order or complex needs related to functional status, cognitive ability, or social support system  Outcome: Progressing     Problem: Knowledge Deficit  Goal: Patient/family/caregiver demonstrates understanding of disease process, treatment plan, medications, and discharge instructions  Description: Complete learning assessment and assess knowledge base    Interventions:  - Provide teaching at level of understanding  - Provide teaching via preferred learning methods  Outcome: Progressing     Problem: Potential for Falls  Goal: Patient will remain free of falls  Description: INTERVENTIONS:  - Educate patient/family on patient safety including physical limitations  - Instruct patient to call for assistance with activity   - Consult OT/PT to assist with strengthening/mobility   - Keep Call bell within reach  - Keep bed low and locked with side rails adjusted as appropriate  - Keep care items and personal belongings within reach  - Initiate and maintain comfort rounds  - Make Fall Risk Sign visible to staff  - Offer Toileting every 2 Hours, in advance of need  - Initiate/Maintain bed alarm  - Obtain necessary fall risk management equipment:   - Apply yellow socks and bracelet for high fall risk patients  - Consider moving patient to room near nurses station  Outcome: Progressing     Problem: CARDIOVASCULAR - ADULT  Goal: Maintains optimal cardiac output and hemodynamic stability  Description: INTERVENTIONS:  - Monitor I/O, vital signs and rhythm  - Monitor for S/S and trends of decreased cardiac output  - Administer and titrate ordered vasoactive medications to optimize hemodynamic stability  - Assess quality of pulses, skin color and temperature  - Assess for signs of decreased coronary artery perfusion  - Instruct patient to report change in severity of symptoms  Outcome: Progressing  Goal: Absence of cardiac dysrhythmias or at baseline rhythm  Description: INTERVENTIONS:  - Continuous cardiac monitoring, vital signs, obtain 12 lead EKG if ordered  - Administer antiarrhythmic and heart rate control medications as ordered  - Monitor electrolytes and administer replacement therapy as ordered  Outcome: Progressing     Problem: RESPIRATORY - ADULT  Goal: Achieves optimal ventilation and oxygenation  Description: INTERVENTIONS:  - Assess for changes in respiratory status  - Assess for changes in mentation and behavior  - Position to facilitate oxygenation and minimize respiratory effort  - Oxygen administered by appropriate delivery if ordered  - Initiate smoking cessation education as indicated  - Encourage broncho-pulmonary hygiene including cough, deep breathe, Incentive Spirometry  - Assess the need for suctioning and aspirate as needed  - Assess and instruct to report SOB or any respiratory difficulty  - Respiratory Therapy support as indicated  Outcome: Progressing     Problem: METABOLIC, FLUID AND ELECTROLYTES - ADULT  Goal: Electrolytes maintained within normal limits  Description: INTERVENTIONS:  - Monitor labs and assess patient for signs and symptoms of electrolyte imbalances  - Administer electrolyte replacement as ordered  - Monitor response to electrolyte replacements, including repeat lab results as appropriate  - Instruct patient on fluid and nutrition as appropriate  Outcome: Progressing  Goal: Fluid balance maintained  Description: INTERVENTIONS:  - Monitor labs   - Monitor I/O and WT  - Instruct patient on fluid and nutrition as appropriate  - Assess for signs & symptoms of volume excess or deficit  Outcome: Progressing  Goal: Glucose maintained within target range  Description: INTERVENTIONS:  - Monitor Blood Glucose as ordered  - Assess for signs and symptoms of hyperglycemia and hypoglycemia  - Administer ordered medications to maintain glucose within target range  - Assess nutritional intake and initiate nutrition service referral as needed  Outcome: Progressing     Problem: SKIN/TISSUE INTEGRITY - ADULT  Goal: Skin Integrity remains intact(Skin Breakdown Prevention)  Description: Assess:  -Perform Ashish assessment every   -Clean and moisturize skin every   -Inspect skin when repositioning, toileting, and assisting with ADLS  -Assess under medical devices such as  every   -Assess extremities for adequate circulation and sensation     Bed Management:  -Have minimal linens on bed & keep smooth, unwrinkled  -Change linens as needed when moist or perspiring  -Avoid sitting or lying in one position for more than  hours while in bed  -Keep HOB at degrees     Toileting:  -Offer bedside commode  -Assess for incontinence every   -Use incontinent care products after each incontinent episode such as     Activity:  -Mobilize patient  times a day  -Encourage activity and walks on unit  -Encourage or provide ROM exercises   -Turn and reposition patient every  Hours  -Use appropriate equipment to lift or move patient in bed  -Instruct/ Assist with weight shifting every  when out of bed in chair  -Consider limitation of chair time  hour intervals    Skin Care:  -Avoid use of baby powder, tape, friction and shearing, hot water or constrictive clothing  -Relieve pressure over bony prominences using   -Do not massage red bony areas    Next Steps:  -Teach patient strategies to minimize risks such as    -Consider consults to  interdisciplinary teams such as   Outcome: Progressing     Problem: HEMATOLOGIC - ADULT  Goal: Maintains hematologic stability  Description: INTERVENTIONS  - Assess for signs and symptoms of bleeding or hemorrhage  - Monitor labs  - Administer supportive blood products/factors as ordered and appropriate  Outcome: Progressing     Problem: MUSCULOSKELETAL - ADULT  Goal: Maintain or return mobility to safest level of function  Description: INTERVENTIONS:  - Assess patient's ability to carry out ADLs; assess patient's baseline for ADL function and identify physical deficits which impact ability to perform ADLs (bathing, care of mouth/teeth, toileting, grooming, dressing, etc )  - Assess/evaluate cause of self-care deficits   - Assess range of motion  - Assess patient's mobility  - Assess patient's need for assistive devices and provide as appropriate  - Encourage maximum independence but intervene and supervise when necessary  - Involve family in performance of ADLs  - Assess for home care needs following discharge   - Consider OT consult to assist with ADL evaluation and planning for discharge  - Provide patient education as appropriate  Outcome: Progressing

## 2022-11-13 NOTE — ASSESSMENT & PLAN NOTE
· Serum glucose on admission is 233   · No prior reported history of DM, however patient poor historian  ·   hemoglobin A1c 9 1  ·   Started on insulin regimen, increased w improvement

## 2022-11-13 NOTE — ASSESSMENT & PLAN NOTE
· Presents with generalized weakness, fatigue, and weight loss  · CT a/P: " 2 5 x 2 2 x 2 2 cm mass in the left lower lobe concerning for lung cancer  Lytic lesions within the left 6th rib and pelvis bones compatible with osseous metastasis "  · Longstanding smoking history  · Will ask pulm to evaluate  · Heme-Onc recommended tissue diagnosis with complete staging    They recommended patient will need anticoagulation which will be lifelong based on probable stage IV malignancy causing hypercoagulable state  · IR tissue biopsy performed - 11/8- prelim positive for metastatic lung adenocarcinoma  · D/w patient friend Isaac Guerra on 11/12, wishes to hear prognostic inpt/rx options from oncology  · Palliative care is on board

## 2022-11-13 NOTE — PLAN OF CARE
Problem: Nutrition/Hydration-ADULT  Goal: Nutrient/Hydration intake appropriate for improving, restoring or maintaining nutritional needs  Description: Monitor and assess patient's nutrition/hydration status for malnutrition  Collaborate with interdisciplinary team and initiate plan and interventions as ordered  Monitor patient's weight and dietary intake as ordered or per policy  Utilize nutrition screening tool and intervene as necessary  Determine patient's food preferences and provide high-protein, high-caloric foods as appropriate       INTERVENTIONS:  - Monitor oral intake, urinary output, labs, and treatment plans  - Assess nutrition and hydration status and recommend course of action  - Evaluate amount of meals eaten  - Assist patient with eating if necessary   - Allow adequate time for meals  - Recommend/ encourage appropriate diets, oral nutritional supplements, and vitamin/mineral supplements  - Order, calculate, and assess calorie counts as needed  - Recommend, monitor, and adjust tube feedings and TPN/PPN based on assessed needs  - Assess need for intravenous fluids  - Provide specific nutrition/hydration education as appropriate  - Include patient/family/caregiver in decisions related to nutrition  Outcome: Progressing     Problem: MOBILITY - ADULT  Goal: Maintain or return to baseline ADL function  Description: INTERVENTIONS:  -  Assess patient's ability to carry out ADLs; assess patient's baseline for ADL function and identify physical deficits which impact ability to perform ADLs (bathing, care of mouth/teeth, toileting, grooming, dressing, etc )  - Assess/evaluate cause of self-care deficits   - Assess range of motion  - Assess patient's mobility; develop plan if impaired  - Assess patient's need for assistive devices and provide as appropriate  - Encourage maximum independence but intervene and supervise when necessary  - Involve family in performance of ADLs  - Assess for home care needs following discharge   - Consider OT consult to assist with ADL evaluation and planning for discharge  - Provide patient education as appropriate  Outcome: Progressing  Goal: Maintains/Returns to pre admission functional level  Description: INTERVENTIONS:  - Perform BMAT or MOVE assessment daily    - Set and communicate daily mobility goal to care team and patient/family/caregiver  - Collaborate with rehabilitation services on mobility goals if consulted  - Perform Range of Motion 2 times a day  - Reposition patient every 2 hours    - Dangle patient 2 times a day  - Stand patient 2 times a day  - Ambulate patient 2 times a day  - Out of bed to chair 2 times a day   - Out of bed for meals 2 times a day  - Out of bed for toileting  - Record patient progress and toleration of activity level   Outcome: Progressing     Problem: Prexisting or High Potential for Compromised Skin Integrity  Goal: Skin integrity is maintained or improved  Description: INTERVENTIONS:  - Identify patients at risk for skin breakdown  - Assess and monitor skin integrity  - Assess and monitor nutrition and hydration status  - Monitor labs   - Assess for incontinence   - Turn and reposition patient  - Assist with mobility/ambulation  - Relieve pressure over bony prominences  - Avoid friction and shearing  - Provide appropriate hygiene as needed including keeping skin clean and dry  - Evaluate need for skin moisturizer/barrier cream  - Collaborate with interdisciplinary team   - Patient/family teaching  - Consider wound care consult   Outcome: Progressing     Problem: PAIN - ADULT  Goal: Verbalizes/displays adequate comfort level or baseline comfort level  Description: Interventions:  - Encourage patient to monitor pain and request assistance  - Assess pain using appropriate pain scale  - Administer analgesics based on type and severity of pain and evaluate response  - Implement non-pharmacological measures as appropriate and evaluate response  - Consider cultural and social influences on pain and pain management  - Notify physician/advanced practitioner if interventions unsuccessful or patient reports new pain  Outcome: Progressing     Problem: INFECTION - ADULT  Goal: Absence or prevention of progression during hospitalization  Description: INTERVENTIONS:  - Assess and monitor for signs and symptoms of infection  - Monitor lab/diagnostic results  - Monitor all insertion sites, i e  indwelling lines, tubes, and drains  - Monitor endotracheal if appropriate and nasal secretions for changes in amount and color  - Stand[place] appropriate cooling/warming therapies per order  - Administer medications as ordered  - Instruct and encourage patient and family to use good hand hygiene technique  - Identify and instruct in appropriate isolation precautions for identified infection/condition  Outcome: Progressing  Goal: Absence of fever/infection during neutropenic period  Description: INTERVENTIONS:  - Monitor WBC    Outcome: Progressing     Problem: SAFETY ADULT  Goal: Maintain or return to baseline ADL function  Description: INTERVENTIONS:  -  Assess patient's ability to carry out ADLs; assess patient's baseline for ADL function and identify physical deficits which impact ability to perform ADLs (bathing, care of mouth/teeth, toileting, grooming, dressing, etc )  - Assess/evaluate cause of self-care deficits   - Assess range of motion  - Assess patient's mobility; develop plan if impaired  - Assess patient's need for assistive devices and provide as appropriate  - Encourage maximum independence but intervene and supervise when necessary  - Involve family in performance of ADLs  - Assess for home care needs following discharge   - Consider OT consult to assist with ADL evaluation and planning for discharge  - Provide patient education as appropriate  Outcome: Progressing  Goal: Maintains/Returns to pre admission functional level  Description: INTERVENTIONS:  - Perform BMAT or MOVE assessment daily    - Set and communicate daily mobility goal to care team and patient/family/caregiver  - Collaborate with rehabilitation services on mobility goals if consulted  - Perform Range of Motion 2 times a day  - Reposition patient every 2 hours  - Dangle patient 2 times a day  - Stand patient 2 times a day  - Ambulate patient 2 times a day  - Out of bed to chair 2 times a day   - Out of bed for meals 2 times a day  - Out of bed for toileting  - Record patient progress and toleration of activity level   Outcome: Progressing  Goal: Patient will remain free of falls  Description: INTERVENTIONS:  - Educate patient/family on patient safety including physical limitations  - Instruct patient to call for assistance with activity   - Consult OT/PT to assist with strengthening/mobility   - Keep Call bell within reach  - Keep bed low and locked with side rails adjusted as appropriate  - Keep care items and personal belongings within reach  - Initiate and maintain comfort rounds  - Make Fall Risk Sign visible to staff  - Offer Toileting every 2 Hours, in advance of need  - Initiate/Maintain bed alarm  - Obtain necessary fall risk management equipment: socks  - Apply yellow socks and bracelet for high fall risk patients  - Consider moving patient to room near nurses station  Outcome: Progressing     Problem: Knowledge Deficit  Goal: Patient/family/caregiver demonstrates understanding of disease process, treatment plan, medications, and discharge instructions  Description: Complete learning assessment and assess knowledge base    Interventions:  - Provide teaching at level of understanding  - Provide teaching via preferred learning methods  Outcome: Progressing     Problem: Potential for Falls  Goal: Patient will remain free of falls  Description: INTERVENTIONS:  - Educate patient/family on patient safety including physical limitations  - Instruct patient to call for assistance with activity   - Consult OT/PT to assist with strengthening/mobility   - Keep Call bell within reach  - Keep bed low and locked with side rails adjusted as appropriate  - Keep care items and personal belongings within reach  - Initiate and maintain comfort rounds  - Make Fall Risk Sign visible to staff  - Offer Toileting every 2 Hours, in advance of need  - Initiate/Maintain bed alarm  - Obtain necessary fall risk management equipment: socks  - Apply yellow socks and bracelet for high fall risk patients  - Consider moving patient to room near nurses station  Outcome: Progressing     Problem: CARDIOVASCULAR - ADULT  Goal: Maintains optimal cardiac output and hemodynamic stability  Description: INTERVENTIONS:  - Monitor I/O, vital signs and rhythm  - Monitor for S/S and trends of decreased cardiac output  - Administer and titrate ordered vasoactive medications to optimize hemodynamic stability  - Assess quality of pulses, skin color and temperature  - Assess for signs of decreased coronary artery perfusion  - Instruct patient to report change in severity of symptoms  Outcome: Progressing  Goal: Absence of cardiac dysrhythmias or at baseline rhythm  Description: INTERVENTIONS:  - Continuous cardiac monitoring, vital signs, obtain 12 lead EKG if ordered  - Administer antiarrhythmic and heart rate control medications as ordered  - Monitor electrolytes and administer replacement therapy as ordered  Outcome: Progressing     Problem: RESPIRATORY - ADULT  Goal: Achieves optimal ventilation and oxygenation  Description: INTERVENTIONS:  - Assess for changes in respiratory status  - Assess for changes in mentation and behavior  - Position to facilitate oxygenation and minimize respiratory effort  - Oxygen administered by appropriate delivery if ordered  - Initiate smoking cessation education as indicated  - Encourage broncho-pulmonary hygiene including cough, deep breathe, Incentive Spirometry  - Assess the need for suctioning and aspirate as needed  - Assess and instruct to report SOB or any respiratory difficulty  - Respiratory Therapy support as indicated  Outcome: Progressing     Problem: METABOLIC, FLUID AND ELECTROLYTES - ADULT  Goal: Electrolytes maintained within normal limits  Description: INTERVENTIONS:  - Monitor labs and assess patient for signs and symptoms of electrolyte imbalances  - Administer electrolyte replacement as ordered  - Monitor response to electrolyte replacements, including repeat lab results as appropriate  - Instruct patient on fluid and nutrition as appropriate  Outcome: Progressing  Goal: Fluid balance maintained  Description: INTERVENTIONS:  - Monitor labs   - Monitor I/O and WT  - Instruct patient on fluid and nutrition as appropriate  - Assess for signs & symptoms of volume excess or deficit  Outcome: Progressing  Goal: Glucose maintained within target range  Description: INTERVENTIONS:  - Monitor Blood Glucose as ordered  - Assess for signs and symptoms of hyperglycemia and hypoglycemia  - Administer ordered medications to maintain glucose within target range  - Assess nutritional intake and initiate nutrition service referral as needed  Outcome: Progressing     Problem: SKIN/TISSUE INTEGRITY - ADULT  Goal: Skin Integrity remains intact(Skin Breakdown Prevention)  Description: Assess:  -Perform Ashish assessment every shift  -Clean and moisturize skin every shift  -Inspect skin when repositioning, toileting, and assisting with ADLS  -Assess extremities for adequate circulation and sensation     Bed Management:  -Have minimal linens on bed & keep smooth, unwrinkled  -Change linens as needed when moist or perspiring  -Avoid sitting or lying in one position for more than 2 hours while in bed  -Keep HOB at 30 degrees     Toileting:  -Offer bedside commode  -Assess for incontinence every shift  -Use incontinent care products after each incontinent episode such as shift    Activity:  -Mobilize patient 2 times a day  -Encourage activity and walks on unit  -Encourage or provide ROM exercises   -Turn and reposition patient every 2 Hours  -Use appropriate equipment to lift or move patient in bed  -Instruct/ Assist with weight shifting every shift when out of bed in chair  -Consider limitation of chair time 2 hour intervals    Skin Care:  -Avoid use of baby powder, tape, friction and shearing, hot water or constrictive clothing  -Relieve pressure over bony prominences using weight shifting  -Do not massage red bony areas    Next Steps:  -Teach patient strategies to minimize risks such as weight shifting   -Consider consults to  interdisciplinary teams such as   Outcome: Progressing     Problem: HEMATOLOGIC - ADULT  Goal: Maintains hematologic stability  Description: INTERVENTIONS  - Assess for signs and symptoms of bleeding or hemorrhage  - Monitor labs  - Administer supportive blood products/factors as ordered and appropriate  Outcome: Progressing     Problem: MUSCULOSKELETAL - ADULT  Goal: Maintain or return mobility to safest level of function  Description: INTERVENTIONS:  - Assess patient's ability to carry out ADLs; assess patient's baseline for ADL function and identify physical deficits which impact ability to perform ADLs (bathing, care of mouth/teeth, toileting, grooming, dressing, etc )  - Assess/evaluate cause of self-care deficits   - Assess range of motion  - Assess patient's mobility  - Assess patient's need for assistive devices and provide as appropriate  - Encourage maximum independence but intervene and supervise when necessary  - Involve family in performance of ADLs  - Assess for home care needs following discharge   - Consider OT consult to assist with ADL evaluation and planning for discharge  - Provide patient education as appropriate  Outcome: Progressing

## 2022-11-13 NOTE — PLAN OF CARE
Problem: Nutrition/Hydration-ADULT  Goal: Nutrient/Hydration intake appropriate for improving, restoring or maintaining nutritional needs  Description: Monitor and assess patient's nutrition/hydration status for malnutrition  Collaborate with interdisciplinary team and initiate plan and interventions as ordered  Monitor patient's weight and dietary intake as ordered or per policy  Utilize nutrition screening tool and intervene as necessary  Determine patient's food preferences and provide high-protein, high-caloric foods as appropriate       INTERVENTIONS:  - Monitor oral intake, urinary output, labs, and treatment plans  - Assess nutrition and hydration status and recommend course of action  - Evaluate amount of meals eaten  - Assist patient with eating if necessary   - Allow adequate time for meals  - Recommend/ encourage appropriate diets, oral nutritional supplements, and vitamin/mineral supplements  - Order, calculate, and assess calorie counts as needed  - Recommend, monitor, and adjust tube feedings and TPN/PPN based on assessed needs  - Assess need for intravenous fluids  - Provide specific nutrition/hydration education as appropriate  - Include patient/family/caregiver in decisions related to nutrition  Outcome: Progressing     Problem: MOBILITY - ADULT  Goal: Maintain or return to baseline ADL function  Description: INTERVENTIONS:  -  Assess patient's ability to carry out ADLs; assess patient's baseline for ADL function and identify physical deficits which impact ability to perform ADLs (bathing, care of mouth/teeth, toileting, grooming, dressing, etc )  - Assess/evaluate cause of self-care deficits   - Assess range of motion  - Assess patient's mobility; develop plan if impaired  - Assess patient's need for assistive devices and provide as appropriate  - Encourage maximum independence but intervene and supervise when necessary  - Involve family in performance of ADLs  - Assess for home care needs following discharge   - Consider OT consult to assist with ADL evaluation and planning for discharge  - Provide patient education as appropriate  Outcome: Progressing  Goal: Maintains/Returns to pre admission functional level  Description: INTERVENTIONS:  - Perform BMAT or MOVE assessment daily    - Set and communicate daily mobility goal to care team and patient/family/caregiver  - Collaborate with rehabilitation services on mobility goals if consulted  - Perform Range of Motion 2 times a day  - Reposition patient every 2 hours    - Dangle patient 2 times a day  - Stand patient 2 times a day  - Ambulate patient 2 times a day  - Out of bed to chair 2 times a day   - Out of bed for meals 2 times a day  - Out of bed for toileting  - Record patient progress and toleration of activity level   Outcome: Progressing     Problem: Prexisting or High Potential for Compromised Skin Integrity  Goal: Skin integrity is maintained or improved  Description: INTERVENTIONS:  - Identify patients at risk for skin breakdown  - Assess and monitor skin integrity  - Assess and monitor nutrition and hydration status  - Monitor labs   - Assess for incontinence   - Turn and reposition patient  - Assist with mobility/ambulation  - Relieve pressure over bony prominences  - Avoid friction and shearing  - Provide appropriate hygiene as needed including keeping skin clean and dry  - Evaluate need for skin moisturizer/barrier cream  - Collaborate with interdisciplinary team   - Patient/family teaching  - Consider wound care consult   Outcome: Progressing     Problem: PAIN - ADULT  Goal: Verbalizes/displays adequate comfort level or baseline comfort level  Description: Interventions:  - Encourage patient to monitor pain and request assistance  - Assess pain using appropriate pain scale  - Administer analgesics based on type and severity of pain and evaluate response  - Implement non-pharmacological measures as appropriate and evaluate response  - Consider cultural and social influences on pain and pain management  - Notify physician/advanced practitioner if interventions unsuccessful or patient reports new pain  Outcome: Progressing     Problem: INFECTION - ADULT  Goal: Absence or prevention of progression during hospitalization  Description: INTERVENTIONS:  - Assess and monitor for signs and symptoms of infection  - Monitor lab/diagnostic results  - Monitor all insertion sites, i e  indwelling lines, tubes, and drains  - Monitor endotracheal if appropriate and nasal secretions for changes in amount and color  - [place] appropriate cooling/warming therapies per order  - Administer medications as ordered  - Instruct and encourage patient and family to use good hand hygiene technique  - Identify and instruct in appropriate isolation precautions for identified infection/condition  Outcome: Progressing  Goal: Absence of fever/infection during neutropenic period  Description: INTERVENTIONS:  - Monitor WBC    Outcome: Progressing     Problem: SAFETY ADULT  Goal: Maintain or return to baseline ADL function  Description: INTERVENTIONS:  -  Assess patient's ability to carry out ADLs; assess patient's baseline for ADL function and identify physical deficits which impact ability to perform ADLs (bathing, care of mouth/teeth, toileting, grooming, dressing, etc )  - Assess/evaluate cause of self-care deficits   - Assess range of motion  - Assess patient's mobility; develop plan if impaired  - Assess patient's need for assistive devices and provide as appropriate  - Encourage maximum independence but intervene and supervise when necessary  - Involve family in performance of ADLs  - Assess for home care needs following discharge   - Consider OT consult to assist with ADL evaluation and planning for discharge  - Provide patient education as appropriate  Outcome: Progressing  Goal: Maintains/Returns to pre admission functional level  Description: INTERVENTIONS:  - Perform BMAT or MOVE assessment daily    - Set and communicate daily mobility goal to care team and patient/family/caregiver  - Collaborate with rehabilitation services on mobility goals if consulted  - Perform Range of Motion 2 times a day  - Reposition patient every 2 hours    - Dangle patient 2 times a day  - Stand patient 2 times a day  - Ambulate patient 2 times a day  - Out of bed to chair 2 times a day   - Out of bed for meals 2 times a day  - Out of bed for toileting  - Record patient progress and toleration of activity level   Outcome: Progressing  Goal: Patient will remain free of falls  Description: INTERVENTIONS:  - Educate patient/family on patient safety including physical limitations  - Instruct patient to call for assistance with activity   - Consult OT/PT to assist with strengthening/mobility   - Keep Call bell within reach  - Keep bed low and locked with side rails adjusted as appropriate  - Keep care items and personal belongings within reach  - Initiate and maintain comfort rounds  - Make Fall Risk Sign visible to staff  - Offer Toileting every 2 Hours, in advance of need  - Initiate/Maintain BED alarm  - Obtain necessary fall risk management equipment: YELLOW SOCKS, YELLOW BRACELET  - Apply yellow socks and bracelet for high fall risk patients  - Consider moving patient to room near nurses station  Outcome: Progressing     Problem: DISCHARGE PLANNING  Goal: Discharge to home or other facility with appropriate resources  Description: INTERVENTIONS:  - Identify barriers to discharge w/patient and caregiver  - Arrange for needed discharge resources and transportation as appropriate  - Identify discharge learning needs (meds, wound care, etc )  - Arrange for interpretive services to assist at discharge as needed  - Refer to Case Management Department for coordinating discharge planning if the patient needs post-hospital services based on physician/advanced practitioner order or complex needs related to functional status, cognitive ability, or social support system  Outcome: Progressing     Problem: Knowledge Deficit  Goal: Patient/family/caregiver demonstrates understanding of disease process, treatment plan, medications, and discharge instructions  Description: Complete learning assessment and assess knowledge base    Interventions:  - Provide teaching at level of understanding  - Provide teaching via preferred learning methods  Outcome: Progressing     Problem: Potential for Falls  Goal: Patient will remain free of falls  Description: INTERVENTIONS:  - Educate patient/family on patient safety including physical limitations  - Instruct patient to call for assistance with activity   - Consult OT/PT to assist with strengthening/mobility   - Keep Call bell within reach  - Keep bed low and locked with side rails adjusted as appropriate  - Keep care items and personal belongings within reach  - Initiate and maintain comfort rounds  - Make Fall Risk Sign visible to staff  - Offer Toileting every 2 Hours, in advance of need  - Initiate/Maintain BED alarm  - Obtain necessary fall risk management equipment: YELLOW SOCKS, YELLOW BRACELET  - Apply yellow socks and bracelet for high fall risk patients  - Consider moving patient to room near nurses station  Outcome: Progressing     Problem: CARDIOVASCULAR - ADULT  Goal: Maintains optimal cardiac output and hemodynamic stability  Description: INTERVENTIONS:  - Monitor I/O, vital signs and rhythm  - Monitor for S/S and trends of decreased cardiac output  - Administer and titrate ordered vasoactive medications to optimize hemodynamic stability  - Assess quality of pulses, skin color and temperature  - Assess for signs of decreased coronary artery perfusion  - Instruct patient to report change in severity of symptoms  Outcome: Progressing  Goal: Absence of cardiac dysrhythmias or at baseline rhythm  Description: INTERVENTIONS:  - Continuous cardiac monitoring, vital signs, obtain 12 lead EKG if ordered  - Administer antiarrhythmic and heart rate control medications as ordered  - Monitor electrolytes and administer replacement therapy as ordered  Outcome: Progressing     Problem: RESPIRATORY - ADULT  Goal: Achieves optimal ventilation and oxygenation  Description: INTERVENTIONS:  - Assess for changes in respiratory status  - Assess for changes in mentation and behavior  - Position to facilitate oxygenation and minimize respiratory effort  - Oxygen administered by appropriate delivery if ordered  - Initiate smoking cessation education as indicated  - Encourage broncho-pulmonary hygiene including cough, deep breathe, Incentive Spirometry  - Assess the need for suctioning and aspirate as needed  - Assess and instruct to report SOB or any respiratory difficulty  - Respiratory Therapy support as indicated  Outcome: Progressing     Problem: METABOLIC, FLUID AND ELECTROLYTES - ADULT  Goal: Electrolytes maintained within normal limits  Description: INTERVENTIONS:  - Monitor labs and assess patient for signs and symptoms of electrolyte imbalances  - Administer electrolyte replacement as ordered  - Monitor response to electrolyte replacements, including repeat lab results as appropriate  - Instruct patient on fluid and nutrition as appropriate  Outcome: Progressing  Goal: Fluid balance maintained  Description: INTERVENTIONS:  - Monitor labs   - Monitor I/O and WT  - Instruct patient on fluid and nutrition as appropriate  - Assess for signs & symptoms of volume excess or deficit  Outcome: Progressing  Goal: Glucose maintained within target range  Description: INTERVENTIONS:  - Monitor Blood Glucose as ordered  - Assess for signs and symptoms of hyperglycemia and hypoglycemia  - Administer ordered medications to maintain glucose within target range  - Assess nutritional intake and initiate nutrition service referral as needed  Outcome: Progressing     Problem: SKIN/TISSUE INTEGRITY - ADULT  Goal: Skin Integrity remains intact(Skin Breakdown Prevention)  Description: Assess:  -Perform Ashish assessment every SHIFT  -Clean and moisturize skin every SHIFT AND PRN  -Inspect skin when repositioning, toileting, and assisting with ADLS  -Assess under medical devices such as MASIMO every SHIFT  -Assess extremities for adequate circulation and sensation     Bed Management:  -Have minimal linens on bed & keep smooth, unwrinkled  -Change linens as needed when moist or perspiring  -Avoid sitting or lying in one position for more than 2 hours while in bed  -Keep HOB at 30 degrees     Toileting:  -Offer bedside commode  -Assess for incontinence every 2 HOURS  -Use incontinent care products after each incontinent episode     Activity:  -Mobilize patient 0 times a day  -Encourage activity and walks on unit  -Encourage or provide ROM exercises   -Turn and reposition patient every 2 Hours  -Use appropriate equipment to lift or move patient in bed  -Instruct/ Assist with weight shifting every 2 HOURS when out of bed in chair  -Consider limitation of chair time 2 hour intervals    Skin Care:  -Avoid use of baby powder, tape, friction and shearing, hot water or constrictive clothing  -Relieve pressure over bony prominences using PILLOWS, WEDGES, WAFFLES  -Do not massage red bony areas    Next Steps:  -Teach patient strategies to minimize risks such as WEIGHT SHIFTING   -Consider consults to  interdisciplinary teams such as WOUND  Outcome: Progressing     Problem: HEMATOLOGIC - ADULT  Goal: Maintains hematologic stability  Description: INTERVENTIONS  - Assess for signs and symptoms of bleeding or hemorrhage  - Monitor labs  - Administer supportive blood products/factors as ordered and appropriate  Outcome: Progressing     Problem: MUSCULOSKELETAL - ADULT  Goal: Maintain or return mobility to safest level of function  Description: INTERVENTIONS:  - Assess patient's ability to carry out ADLs; assess patient's baseline for ADL function and identify physical deficits which impact ability to perform ADLs (bathing, care of mouth/teeth, toileting, grooming, dressing, etc )  - Assess/evaluate cause of self-care deficits   - Assess range of motion  - Assess patient's mobility  - Assess patient's need for assistive devices and provide as appropriate  - Encourage maximum independence but intervene and supervise when necessary  - Involve family in performance of ADLs  - Assess for home care needs following discharge   - Consider OT consult to assist with ADL evaluation and planning for discharge  - Provide patient education as appropriate  Outcome: Progressing

## 2022-11-13 NOTE — QUICK NOTE
I spoke with Dr Josefina Nicolas about declining platelet counts  He recommends ok for anticoagulation above 25k if there are no signs of bleeding  We will monitor closely   Recommends recheck labs in the AM

## 2022-11-13 NOTE — PROGRESS NOTES
[place]  Progress Note Meldon Less 1960, 58 y o  male MRN: [id number]  Unit/Bed#: -01 Encounter: [id number]  Primary Care Provider: No primary care provider on file  Date and time admitted to hospital: 11/2/2022  9:13 PM    Thrombocytopenia ([facility] [address] )  Assessment & Plan  D/w hematology on call  Will transition to argatroban  Gtt until HIT is ruled out  Monitor platelets    Severe protein-calorie malnutrition (HCC)  Assessment & Plan  Malnutrition Findings:   Adult Malnutrition type: Chronic illness  Adult Degree of Malnutrition: Other severe protein calorie malnutrition  Malnutrition Characteristics: Fat loss, Muscle loss                  360 Statement: Pt presents with severe protein calorie malnutrition as evidenced by orbital hollowing, temporal scooping and prominent clavicle bone protrusion  BMI Findings: Body mass index is 23 63 kg/m²  Anemia  Assessment & Plan  Severe anemia, progressive   Likely related to malignancy  No sx of bleeding  Transfused 1 unit with improvement       Hypokalemia  Assessment & Plan  Replete aggressively  resolved    Pedal edema  Assessment & Plan  · B/l pedal edema (R>L) for a couple of days   · Due to probable newly diagnosed metastatic cancer on CT scan, which demonstrated DVTs     Hyponatremia  Assessment & Plan  · resolved    Elevated glucose  Assessment & Plan  · Serum glucose on admission is 233   · No prior reported history of DM, however patient poor historian  ·   hemoglobin A1c 9 1  ·   Started on insulin regimen, increased w improvement    Hypercalcemia  Assessment & Plan  · PTH low, hypercalcemia of malignancy is etiology   · apprec nephrology assistance  · S/p IVF, lasix, zometa  Slowly improving      Schizophrenia ([facility] [address] )  Assessment & Plan  · Self-reported history of schizophrenia   · Reportedly treated with clonazepam 100 mg HS, however patient reports non adherence with this   · Presented to the ED with acute psychosis requiring Haldol 5 mg IM x1  · Neuro- psychiatry was consulted because of question about patient's capacity  Patient did not seem to have insight into his condition  He does not have capacity to make medical decisions at this time  Abnormal CT scan, gallbladder  Assessment & Plan  · Distended gallbladder on CT scan   · Obtain right upper quadrant ultrasound patient also with elevated T bili and alk-phos  · Completed zosyn  No acute surgical needs by general surgery  No suspicion for cholecystitis per surg    SIRS (systemic inflammatory response syndrome) (HCC)  Assessment & Plan  · Sirs criteria met on admission:  Tachycardia and leukocytosis at 17 36 K without bandemia   · No severe sepsis criteria met   · Suspect reactive from suspected metastatic lung cancer as seen on CT scan   · UA without infection   · Procalcitonin elevated at 11 67-> 3    · ? Elevation secondary to infection or possible small cell lung cancer  · CT scan does show distended gallbladder and with elevated T bili and alk-phos, received IV Zosyn to cover for possible infection  Surgery suggests no acute surgical intervention  · Blood cultures x2, neg  · apprec ID input  · Fever curve improving  Observing off abx  Fevers felt 2/2 malignancy, dvt etc       * Suspected Primary malignant neoplasm of left lung metastatic to other site [facility])  Assessment & Plan  · Presents with generalized weakness, fatigue, and weight loss  · CT a/P: " 2 5 x 2 2 x 2 2 cm mass in the left lower lobe concerning for lung cancer  Lytic lesions within the left 6th rib and pelvis bones compatible with osseous metastasis "  · Longstanding smoking history  · Will ask pulm to evaluate  · Heme-Onc recommended tissue diagnosis with complete staging    They recommended patient will need anticoagulation which will be lifelong based on probable stage IV malignancy causing hypercoagulable state  · IR tissue biopsy performed - 11/8- prelim positive for metastatic lung adenocarcinoma  · D/w patient friend Darcie Khan on , wishes to hear prognostic inpt/rx options from oncology  · Palliative care is on board         VTE  Prophylaxis:   Pharmacologic: in place    Patient Centered Rounds: I have performed bedside rounds with nursing staff today  Discussions with Specialists or Other Care Team Provider: case management    Education and Discussions with Family / Patient: pt      Current Length of Stay: 10 day(s)    Current Patient Status: Inpatient        Code Status: Level 1 - Full Code      Subjective:   Pt had episode of agitation last night ripped out IV  More calm now  without chest pain or sob  Denies bleeding    Patient is seen and examined at bedside  All other ROS are negative  Objective:     Vitals:   Temp (24hrs), Av 9 °F (36 6 °C), Min:97 8 °F (36 6 °C), Max:98 °F (36 7 °C)    Temp:  [97 8 °F (36 6 °C)-98 °F (36 7 °C)] 98 °F (36 7 °C)  HR:  [101-112] 112  Resp:  [19-22] 22  BP: (124-160)/(79-95) 124/84  SpO2:  [92 %-95 %] 92 %  Body mass index is 23 63 kg/m²  Input and Output Summary (last 24 hours):        Intake/Output Summary (Last 24 hours) at 2022 1010  Last data filed at 2022  Gross per 24 hour   Intake --   Output 500 ml   Net -500 ml       Physical Exam:       GEN: No acute distress, comfortable chronically ill   HEEENT: No JVD, PERRLA, no scleral icterus  RESP: Lungs clear to auscultation bilaterally  CV: RRR, +s1/s2   ABD: SOFT NON TENDER, POSITIVE BOWEL SOUNDS, NO DISTENTION  PSYCH: CALM  NEURO: mentation baseline, NO FOCAL DEFICITS  SKIN: NO RASH  EXTREM: NO EDEMA    Additional Data:     Labs:    Results from last 7 days   Lab Units 22  2359   WBC Thousand/uL 15 50*   HEMOGLOBIN g/dL 9 3*   HEMATOCRIT % 27 6*   PLATELETS Thousands/uL 46*   NEUTROS PCT % 85*   LYMPHS PCT % 6*   MONOS PCT % 8   EOS PCT % 0     Results from last 7 days   Lab Units 22  0909   SODIUM mmol/L 132*   POTASSIUM mmol/L 3 8   CHLORIDE mmol/L 98   CO2 mmol/L 27   BUN mg/dL 23   CREATININE mg/dL 1 27   ANION GAP mmol/L 7   CALCIUM mg/dL 8 0*   ALBUMIN g/dL 1 8*   TOTAL BILIRUBIN mg/dL 0 50   ALK PHOS U/L 83   ALT U/L 14   AST U/L 5   GLUCOSE RANDOM mg/dL 330*     Results from last 7 days   Lab Units 11/11/22  0417   INR  1 08     Results from last 7 days   Lab Units 11/13/22  0732 11/12/22  2015 11/12/22  1601 11/12/22  1251 11/12/22  0748 11/11/22  2116 11/11/22  1558 11/11/22  1113 11/11/22  0724 11/10/22  2116 11/10/22  1557 11/10/22  1155   POC GLUCOSE mg/dl 163* 174* 246* 324* 334* 297* 274* 245* 139 138 164* 109                   * I Have Reviewed All Lab Data Listed Above  Imaging:     Results for orders placed during the hospital encounter of 11/02/22    XR chest portable    Narrative  CHEST    INDICATION:   Cough, tachycardia, weakness  COMPARISON:  Abdomen CT 11/3/2022, CXR 3/20/2006  EXAM PERFORMED/VIEWS:  XR CHEST PORTABLE      FINDINGS:    Cardiomediastinal silhouette appears unremarkable  2 6 cm solid left lower lobe mass corresponding with the abdomen CT   4 cm cavitary right upper lobe mass  No acute disease  No effusion or pneumothorax  Left lateral 6th rib metastasis corresponding with the abdomen CT probable posterior left 5th rib metastasis  Impression  Right upper lobe cavitary mass, left lower lobe mass, and left rib metastases, some which are visible on the abdomen CT  Findings on the abdomen CT were reported to the emergency physician by Dr Rudy Smith  Workstation performed: [id number]    No results found for this or any previous visit  *I have reviewed all imaging reports listed above      Recent Cultures (last 7 days):     Results from last 7 days   Lab Units 11/09/22  0352   BLOOD CULTURE  No Growth After 4 Days  No Growth After 4 Days         Last 24 Hours Medication List:   Current Facility-Administered Medications   Medication Dose Route Frequency Provider Last Rate   • acetaminophen  650 mg Oral Q8H PRN Laly Velazco PA-C     • argatroban  0 1-3 mcg/kg/min Intravenous Titrated Tennille Broderick MD     • HYDROmorphone  0 5 mg Intravenous Q3H PRN Tennille Broderick MD     • influenza vaccine  0 5 mL Intramuscular Prior to discharge Laly Velazco PA-C     • insulin glargine  11 Units Subcutaneous HS Tennille Broderick MD     • insulin lispro  1-5 Units Subcutaneous TID AC Laly Velazco PA-C     • insulin lispro  1-5 Units Subcutaneous HS Laly Velazco PA-C     • insulin lispro  5 Units Subcutaneous TID With Meals Tennille Broderick MD     • lidocaine  1 patch Topical Daily Vivek Munoz MD     • lidocaine  1 patch Topical Daily Vivek Munoz MD     • nicotine  1 patch Transdermal Daily Laly Velazco PA-C     • OLANZapine  5 mg Oral HS Vivek Munoz MD     • ondansetron  4 mg Intravenous Q6H PRN Laly Velazco PA-C     • oxyCODONE  10 mg Oral Q4H PRN Tennille Broderick MD     • oxyCODONE  5 mg Oral Q6H PRN Tennille Broderick MD     • senna  1 tablet Oral HS PRN Laly Velazco PA-C          Today, Patient Was Seen By: Tennille Broderick MD    ** Please Note: Dictation voice to text software may have been used in the creation of this document   **

## 2022-11-13 NOTE — QUICK NOTE
Was messaged by patient's primary attending about concern for HIT and for [facility] recommendations  Chart reviewed  Mr Margie Hoang is a 58 Y M w/ hx of schizophrenia who was initially admitted on 11/2/22 for management of PE/DVT and workup of a lung mass with concern for malignancy  Patient has been maintained on hep gtt since 11/3 for management of DVT/PE  Imaging was concern for metastatic osseous lesions, which has been biopsied and resulted consistent with metastatic lung adenocarcinoma  Patient also with exposure to zosyn for several days for possible cholecystitis  Although thrombocytopenia can be 2/2 abx exposure, there is considerable suspicion for HIT given the timing of platelet drop (7-8 days after hep exposure), degree of platelet drop, etc  Patient also with exposure to Zyprexa, but it's not a common cause of thrombocytopenia  Thrombotic events of PE/DVT were present on admission and not 2/2 heparin exposure  HIT score is intermediate at 5  Given that HIT is in the ddx as a possible etiology of pt's thrombocytopenia, will check for heparin induced platelet antibody and serotonin release assay  Recommend switching the patient over to argatroban gtt for now with daily CBC monitoring to evaluate for  (eliquis and fondaparinux are only approved for HIT as off-label uses, although we can consider transitioning the patient to either fondaparinux or eliquis after plt count is stabilized on argatroban gtt)  Heparin induced plt antibody and CHANNING will take about 3-5 days to result  Continue avoiding any heparin products till we have the antibody and CHANNING results back  Primary team to follow up with hematology team in the next few days to discuss about [facility] management based on platelet response on the CBC and results of CHANNING panel

## 2022-11-14 PROBLEM — E11.9 TYPE 2 DIABETES MELLITUS WITHOUT COMPLICATION, WITHOUT LONG-TERM CURRENT USE OF INSULIN (HCC): Status: ACTIVE | Noted: 2022-11-14

## 2022-11-14 PROBLEM — E87.6 HYPOKALEMIA: Status: RESOLVED | Noted: 2022-11-09 | Resolved: 2022-11-14

## 2022-11-14 PROBLEM — E83.52 HYPERCALCEMIA: Status: RESOLVED | Noted: 2022-11-03 | Resolved: 2022-11-14

## 2022-11-14 LAB
APTT PPP: 107 SECONDS (ref 23–37)
APTT PPP: 75 SECONDS (ref 23–37)
APTT PPP: 85 SECONDS (ref 23–37)
APTT PPP: 89 SECONDS (ref 23–37)
BACTERIA BLD CULT: NORMAL
BACTERIA BLD CULT: NORMAL
ERYTHROCYTE [DISTWIDTH] IN BLOOD BY AUTOMATED COUNT: 16.4 % (ref 11.6–15.1)
GLUCOSE SERPL-MCNC: 163 MG/DL (ref 65–140)
GLUCOSE SERPL-MCNC: 235 MG/DL (ref 65–140)
GLUCOSE SERPL-MCNC: 284 MG/DL (ref 65–140)
GLUCOSE SERPL-MCNC: 445 MG/DL (ref 65–140)
HCT VFR BLD AUTO: 24 % (ref 36.5–49.3)
HGB BLD-MCNC: 7.9 G/DL (ref 12–17)
MCH RBC QN AUTO: 31.6 PG (ref 26.8–34.3)
MCHC RBC AUTO-ENTMCNC: 32.9 G/DL (ref 31.4–37.4)
MCV RBC AUTO: 96 FL (ref 82–98)
PLATELET # BLD AUTO: 68 THOUSANDS/UL (ref 149–390)
PMV BLD AUTO: 11.7 FL (ref 8.9–12.7)
RBC # BLD AUTO: 2.5 MILLION/UL (ref 3.88–5.62)
WBC # BLD AUTO: 12.87 THOUSAND/UL (ref 4.31–10.16)

## 2022-11-14 RX ORDER — INSULIN LISPRO 100 [IU]/ML
2-12 INJECTION, SOLUTION INTRAVENOUS; SUBCUTANEOUS
Status: DISCONTINUED | OUTPATIENT
Start: 2022-11-14 | End: 2022-11-18 | Stop reason: HOSPADM

## 2022-11-14 RX ORDER — INSULIN GLARGINE 100 [IU]/ML
13 INJECTION, SOLUTION SUBCUTANEOUS
Status: DISCONTINUED | OUTPATIENT
Start: 2022-11-14 | End: 2022-11-17

## 2022-11-14 RX ORDER — INSULIN LISPRO 100 [IU]/ML
7 INJECTION, SOLUTION INTRAVENOUS; SUBCUTANEOUS
Status: DISCONTINUED | OUTPATIENT
Start: 2022-11-15 | End: 2022-11-18 | Stop reason: HOSPADM

## 2022-11-14 RX ADMIN — ARGATROBAN 1 MCG/KG/MIN: 1 INJECTION, SOLUTION INTRAVENOUS at 18:50

## 2022-11-14 RX ADMIN — OLANZAPINE 5 MG: 5 TABLET, FILM COATED ORAL at 22:05

## 2022-11-14 RX ADMIN — INSULIN LISPRO 5 UNITS: 100 INJECTION, SOLUTION INTRAVENOUS; SUBCUTANEOUS at 09:01

## 2022-11-14 RX ADMIN — HYDROMORPHONE HYDROCHLORIDE 0.5 MG: 1 INJECTION, SOLUTION INTRAMUSCULAR; INTRAVENOUS; SUBCUTANEOUS at 16:28

## 2022-11-14 RX ADMIN — ACETAMINOPHEN 650 MG: 325 TABLET, FILM COATED ORAL at 20:00

## 2022-11-14 RX ADMIN — INSULIN LISPRO 2 UNITS: 100 INJECTION, SOLUTION INTRAVENOUS; SUBCUTANEOUS at 12:36

## 2022-11-14 RX ADMIN — ACETAMINOPHEN 650 MG: 325 TABLET, FILM COATED ORAL at 03:44

## 2022-11-14 RX ADMIN — INSULIN LISPRO 5 UNITS: 100 INJECTION, SOLUTION INTRAVENOUS; SUBCUTANEOUS at 12:31

## 2022-11-14 RX ADMIN — ARGATROBAN 1 MCG/KG/MIN: 1 INJECTION, SOLUTION INTRAVENOUS at 06:51

## 2022-11-14 RX ADMIN — ACETAMINOPHEN 650 MG: 325 TABLET, FILM COATED ORAL at 12:25

## 2022-11-14 RX ADMIN — INSULIN LISPRO 12 UNITS: 100 INJECTION, SOLUTION INTRAVENOUS; SUBCUTANEOUS at 22:05

## 2022-11-14 RX ADMIN — HYDROMORPHONE HYDROCHLORIDE 0.5 MG: 1 INJECTION, SOLUTION INTRAMUSCULAR; INTRAVENOUS; SUBCUTANEOUS at 22:17

## 2022-11-14 RX ADMIN — ARGATROBAN 2 MCG/KG/MIN: 1 INJECTION, SOLUTION INTRAVENOUS at 00:33

## 2022-11-14 RX ADMIN — INSULIN LISPRO 1 UNITS: 100 INJECTION, SOLUTION INTRAVENOUS; SUBCUTANEOUS at 09:00

## 2022-11-14 RX ADMIN — INSULIN GLARGINE 13 UNITS: 100 INJECTION, SOLUTION SUBCUTANEOUS at 22:05

## 2022-11-14 RX ADMIN — OXYCODONE HYDROCHLORIDE 10 MG: 5 TABLET ORAL at 02:29

## 2022-11-14 NOTE — PROGRESS NOTES
Medical Oncology/Hematology Progress Note  Nancy Rodriguez, male, 58 y  o , 1960,  /-[birth year], [id number]     Assessment and Plan  1  Metastatic adenocarcinoma of the lung with widespread osseous disease    11/8/22 IR bone biopsy  Final Diagnosis   A  Iliac Crest, Left, :  -Metastatic adenocarcinoma , consistent with metastatic adenocarcinoma from the lung      Note:  Tumor cells are  positive for Mucin , CKC, CAM5 2, Lung markers (CK7, TTF-1, Napsin)  and negative for  CDX2, CK20, Controls reacted appropriately          11/3/22 CT AP:   2 5 x 2 2 x 2 2 cm mass in the left lower lobe concerning for lung cancer  Lytic lesions within the left 6th rib and pelvis bones compatible with osseous metastasis  small left pleural effusion with adjacent atelectasis    11/3/22 CTA chest:  3 6 cm thick walled cavitary right upper lobe tumor and 2 8 cm necrotic left lower lobe tumor  Ill-defined infiltration of the mediastinal fat likely due to tumor with enlarged left hilar node, likely metastatic  Metastatic disease to bilateral ribs and T6 and T8 with severe compression deformities  A few small subsegmental emboli in the right middle lobe, right lower lobe, and inferior lingula  11/6/22 MRI Lumbar Spine:  Scattered enhancing osseous metastases throughout the lumbar spine most notable at the T12 and L2-L4 vertebral bodies  Bilateral iliac bone lesions, partially imaged  There is no pathologic compression fracture    Moderate L5-S1 disc bulge with moderate bilateral subarticular/lateral recess narrowing  Moderate to severe bilateral L5-S1 neural foraminal narrowing with encroachment of the exiting L5 nerve roots    Patient has stage IV lung cancer  This is incurable disease  Treatment could be offered with the goal of life prolongation, palliation  Per NCCN guidelines, regimen would include chemo and immunotherapy    Common regimen is carboplatin Alimta and Keytruda  Would request molecular studies to jenny for any actionable mutations     GOC: Discussed diagnosis, prognosis, treatment options with patient and his friend Cara Ovalle who has been identified as patient's  decision maker  I reviewed all imaging and pathology results  I explained given extent of metastatic disease patient has incurable cancer  Treatment would be palliation and life prolongation and involve systemic therapy  I also discussed hospice as an alternative to disease directed cares  I question patient's ability to tolerate systemic chemotherapy given his deconditioned state and underlying medical conditions    Patient was alert and oriented  He answered all of my questions appropriately  However he has very poor insight into the severity of his disease  He stated “I do not believe I have cancer"   Patient's friend Leroy Watson did verbalize understanding of information shared  He is requesting that patient consider treatment and would like patient to undergo disease directed therapy  Patient appears very weak and debilitated  He will require rehabilitation and maybe even long-term placement given his incapacity to make medical decisions  Patient may benefit from repeat neuropsych evaluation to determine his overall capacity  Given that patient's  decision maker is requesting treatment for patient's cancer, I will arrange for outpatient follow-up with Medical Oncology upon hospital discharge to determine if patient is a candidate for treatment  Patient would benefit from ongoing palliative care support  3  Pulmonary embolism  4  Bilateral acute DVT  Patient was found to have pulmonary embolism on CTA of chest as well as bilateral acute DVTs  Suspect hypercoagulable state from underlying malignancy  Patient has been anticoagulated with heparin drip  This was discontinued on 11/13/2022 for sudden drop in platelet count    5  Thrombocytopenia  Concern for HIT  Platelet count was normal, 258 on admission    It dropped to a low of 32 on 11/13  Patient has been maintained on hep gtt since 11/3 for management of DVT/PE  Thrombotic events of PE/DVT were present on admission and not 2/2 heparin exposure  HIT score is intermediate at 5   · Heparin has been discontinued  Patient currently has argatroban running which is appropriate while heparin induced platelet antibody and CHANNING are pending  · Can eventually be transitioned to either Eliquis or fondaparinux     He will require lifelong anticoagulation given that he has stage IV malignancy  Platelet count improved to 68 today      2  Hypercalcemia of malignancy  Serum calcium elevated on admission 10 6, corrected calcium 11 5  Serum calcium had risen to a high 11 1, corrected calcium 12 5  · Suspect this is hypercalcemia secondary to malignancy  Imaging demonstrates multiple bony lesions  · S/P Zometa 4 mg IV 11/7/22        Please see initial hospital consult note dated 11/3/2022 for admission details  HPI: Fernando Patel is a 58y o  year old male with a history of schizophrenia,  who presented 11/2/2022 for symptoms of generalized weakness and fatigue  Imaging demonstrates findings of bilateral lesions in the lungs with left pleural effusion and multiple bony lesions all concerning for metastasis  He was also found to have pulmonary embolism and bilateral acute lower extremity DVT  IR bone biopsy was completed and pathology is consistent with metastatic adenocarcinoma of the lung   He has been seen by behavioral health and results of neuro psychological exam revealed diffuse cognitive dysfunction  He was deemed to not have capacity to make informed medical decisions secondary to underlying schizophrenia        Review of Systems   Constitutional: Positive for fatigue  Neurological: Positive for extremity weakness  All other systems reviewed and are negative  Interval History:   Patient seen and examined  He was alert and oriented    Denies any pain or discomfort  He appears weak, frail  He repeatedly requested to be discharged stating he has to get finances in order  He was a little tangential    Discussed diagnosis and informed him he has metastatic lung cancer  Patient was on accepting of this and set on multiple times, “I do not believe I have cancer”  Patient's friend Vannessa Riley was present for visit  He did participate in conversation and understands diagnosis and overall prognosis  At the end of visit, patient was asking his friend to bring him Yanira's    /66 (BP Location: Right arm)   Pulse (!) 110   Temp 98 3 °F (36 8 °C) (Oral)   Resp 20   Ht 5' 9" (1 753 m)   Wt 72 6 kg (160 lb)   SpO2 96%   BMI 23 63 kg/m²       Physical Exam  Constitutional:       General: He is not in acute distress  Appearance: He is ill-appearing  He is not toxic-appearing  Comments: Thin appearing    HENT:      Head: Normocephalic and atraumatic  Eyes:      General: No scleral icterus  Cardiovascular:      Rate and Rhythm: Tachycardia present  Pulmonary:      Effort: Pulmonary effort is normal  No respiratory distress  Musculoskeletal:      Cervical back: Normal range of motion  Skin:     General: Skin is warm and dry  Neurological:      General: No focal deficit present  Mental Status: He is alert and oriented to person, place, and time     Psychiatric:      Comments: Lacks insight into his medical condition         Recent Results (from the past 48 hour(s))   Fingerstick Glucose (POCT)    Collection Time: 11/12/22  7:48 AM   Result Value Ref Range    POC Glucose 334 (H) 65 - 140 mg/dl   CBC and differential    Collection Time: 11/12/22  9:09 AM   Result Value Ref Range    WBC 11 83 (H) 4 31 - 10 16 Thousand/uL    RBC 2 56 (L) 3 88 - 5 62 Million/uL    Hemoglobin 8 2 (L) 12 0 - 17 0 g/dL    Hematocrit 24 4 (L) 36 5 - 49 3 %    MCV 95 82 - 98 fL    MCH 32 0 26 8 - 34 3 pg    MCHC 33 6 31 4 - 37 4 g/dL    RDW 15 9 (H) 11 6 - 15 1 %    MPV 11 6 8 9 - 12 7 fL    Platelets 52 (L) 291 - 390 Thousands/uL    nRBC 0 /100 WBCs    Neutrophils Relative 81 (H) 43 - 75 %    Immat GRANS % 1 0 - 2 %    Lymphocytes Relative 10 (L) 14 - 44 %    Monocytes Relative 7 4 - 12 %    Eosinophils Relative 1 0 - 6 %    Basophils Relative 0 0 - 1 %    Neutrophils Absolute 9 55 (H) 1 85 - 7 62 Thousands/µL    Immature Grans Absolute 0 11 0 00 - 0 20 Thousand/uL    Lymphocytes Absolute 1 23 0 60 - 4 47 Thousands/µL    Monocytes Absolute 0 85 0 17 - 1 22 Thousand/µL    Eosinophils Absolute 0 06 0 00 - 0 61 Thousand/µL    Basophils Absolute 0 03 0 00 - 0 10 Thousands/µL   Comprehensive metabolic panel    Collection Time: 11/12/22  9:09 AM   Result Value Ref Range    Sodium 132 (L) 135 - 147 mmol/L    Potassium 3 8 3 5 - 5 3 mmol/L    Chloride 98 96 - 108 mmol/L    CO2 27 21 - 32 mmol/L    ANION GAP 7 4 - 13 mmol/L    BUN 23 5 - 25 mg/dL    Creatinine 1 27 0 60 - 1 30 mg/dL    Glucose 330 (H) 65 - 140 mg/dL    Calcium 8 0 (L) 8 3 - 10 1 mg/dL    Corrected Calcium 9 8 8 3 - 10 1 mg/dL    AST 5 5 - 45 U/L    ALT 14 12 - 78 U/L    Alkaline Phosphatase 83 46 - 116 U/L    Total Protein 5 6 (L) 6 4 - 8 4 g/dL    Albumin 1 8 (L) 3 5 - 5 0 g/dL    Total Bilirubin 0 50 0 20 - 1 00 mg/dL    eGFR 60 ml/min/1 73sq m   Fingerstick Glucose (POCT)    Collection Time: 11/12/22 12:51 PM   Result Value Ref Range    POC Glucose 324 (H) 65 - 140 mg/dl   Fingerstick Glucose (POCT)    Collection Time: 11/12/22  4:01 PM   Result Value Ref Range    POC Glucose 246 (H) 65 - 140 mg/dl   APTT    Collection Time: 11/12/22  6:06 PM   Result Value Ref Range    PTT 75 (H) 23 - 37 seconds   Fingerstick Glucose (POCT)    Collection Time: 11/12/22  8:15 PM   Result Value Ref Range    POC Glucose 174 (H) 65 - 140 mg/dl   APTT    Collection Time: 11/12/22 11:51 PM   Result Value Ref Range    PTT 73 (H) 23 - 37 seconds   CBC and differential    Collection Time: 11/12/22 11:59 PM   Result Value Ref Range    WBC 15 50 (H) 4 31 - 10 16 Thousand/uL    RBC 2 96 (L) 3 88 - 5 62 Million/uL    Hemoglobin 9 3 (L) 12 0 - 17 0 g/dL    Hematocrit 27 6 (L) 36 5 - 49 3 %    MCV 93 82 - 98 fL    MCH 31 4 26 8 - 34 3 pg    MCHC 33 7 31 4 - 37 4 g/dL    RDW 16 0 (H) 11 6 - 15 1 %    MPV 11 3 8 9 - 12 7 fL    Platelets 46 (LL) 105 - 390 Thousands/uL    nRBC 0 /100 WBCs    Neutrophils Relative 85 (H) 43 - 75 %    Immat GRANS % 1 0 - 2 %    Lymphocytes Relative 6 (L) 14 - 44 %    Monocytes Relative 8 4 - 12 %    Eosinophils Relative 0 0 - 6 %    Basophils Relative 0 0 - 1 %    Neutrophils Absolute 13 10 (H) 1 85 - 7 62 Thousands/µL    Immature Grans Absolute 0 18 0 00 - 0 20 Thousand/uL    Lymphocytes Absolute 0 98 0 60 - 4 47 Thousands/µL    Monocytes Absolute 1 17 0 17 - 1 22 Thousand/µL    Eosinophils Absolute 0 05 0 00 - 0 61 Thousand/µL    Basophils Absolute 0 02 0 00 - 0 10 Thousands/µL   Fingerstick Glucose (POCT)    Collection Time: 11/13/22  7:32 AM   Result Value Ref Range    POC Glucose 163 (H) 65 - 140 mg/dl   CBC and differential    Collection Time: 11/13/22  9:38 AM   Result Value Ref Range    WBC 12 61 (H) 4 31 - 10 16 Thousand/uL    RBC 2 61 (L) 3 88 - 5 62 Million/uL    Hemoglobin 8 3 (L) 12 0 - 17 0 g/dL    Hematocrit 24 7 (L) 36 5 - 49 3 %    MCV 95 82 - 98 fL    MCH 31 8 26 8 - 34 3 pg    MCHC 33 6 31 4 - 37 4 g/dL    RDW 16 1 (H) 11 6 - 15 1 %    MPV 11 9 8 9 - 12 7 fL    Platelets 32 (LL) 803 - 390 Thousands/uL    nRBC 0 /100 WBCs    Neutrophils Relative 80 (H) 43 - 75 %    Immat GRANS % 1 0 - 2 %    Lymphocytes Relative 11 (L) 14 - 44 %    Monocytes Relative 8 4 - 12 %    Eosinophils Relative 0 0 - 6 %    Basophils Relative 0 0 - 1 %    Neutrophils Absolute 10 12 (H) 1 85 - 7 62 Thousands/µL    Immature Grans Absolute 0 15 0 00 - 0 20 Thousand/uL    Lymphocytes Absolute 1 34 0 60 - 4 47 Thousands/µL    Monocytes Absolute 0 94 0 17 - 1 22 Thousand/µL    Eosinophils Absolute 0 04 0 00 - 0 61 Thousand/µL    Basophils Absolute 0  02 0 00 - 0 10 Thousands/µL   Comprehensive metabolic panel    Collection Time: 11/13/22  9:38 AM   Result Value Ref Range    Sodium 138 135 - 147 mmol/L    Potassium 3 5 3 5 - 5 3 mmol/L    Chloride 102 96 - 108 mmol/L    CO2 30 21 - 32 mmol/L    ANION GAP 6 4 - 13 mmol/L    BUN 18 5 - 25 mg/dL    Creatinine 1 02 0 60 - 1 30 mg/dL    Glucose 152 (H) 65 - 140 mg/dL    Calcium 8 2 (L) 8 3 - 10 1 mg/dL    Corrected Calcium 9 9 8 3 - 10 1 mg/dL    AST 12 5 - 45 U/L    ALT 16 12 - 78 U/L    Alkaline Phosphatase 82 46 - 116 U/L    Total Protein 5 8 (L) 6 4 - 8 4 g/dL    Albumin 1 9 (L) 3 5 - 5 0 g/dL    Total Bilirubin 0 60 0 20 - 1 00 mg/dL    eGFR 78 ml/min/1 73sq m   APTT    Collection Time: 11/13/22  9:38 AM   Result Value Ref Range    PTT 34 23 - 37 seconds   Protime-INR    Collection Time: 11/13/22  9:38 AM   Result Value Ref Range    Protime 15 4 (H) 11 6 - 14 5 seconds    INR 1 14 0 84 - 1 19   Hepatic function panel    Collection Time: 11/13/22  9:38 AM   Result Value Ref Range    Total Bilirubin 0 60 0 20 - 1 00 mg/dL    Bilirubin, Direct 0 15 0 00 - 0 20 mg/dL    Alkaline Phosphatase 86 46 - 116 U/L    AST 15 5 - 45 U/L    ALT 14 12 - 78 U/L    Total Protein 5 5 (L) 6 4 - 8 4 g/dL    Albumin 2 0 (L) 3 5 - 5 0 g/dL   Fingerstick Glucose (POCT)    Collection Time: 11/13/22 12:26 PM   Result Value Ref Range    POC Glucose 166 (H) 65 - 140 mg/dl   Fingerstick Glucose (POCT)    Collection Time: 11/13/22  4:41 PM   Result Value Ref Range    POC Glucose 236 (H) 65 - 140 mg/dl   Fingerstick Glucose (POCT)    Collection Time: 11/13/22  8:51 PM   Result Value Ref Range    POC Glucose 176 (H) 65 - 140 mg/dl   APTT    Collection Time: 11/13/22 11:49 PM   Result Value Ref Range     (H) 23 - 37 seconds   CBC daily while receiving argatroban    Collection Time: 11/14/22  5:07 AM   Result Value Ref Range    WBC 12 87 (H) 4 31 - 10 16 Thousand/uL    RBC 2 50 (L) 3 88 - 5 62 Million/uL    Hemoglobin 7 9 (L) 12 0 - 17 0 g/dL    Hematocrit 24 0 (L) 36 5 - 49 3 %    MCV 96 82 - 98 fL    MCH 31 6 26 8 - 34 3 pg    MCHC 32 9 31 4 - 37 4 g/dL    RDW 16 4 (H) 11 6 - 15 1 %    Platelets 68 (L) 319 - 390 Thousands/uL    MPV 11 7 8 9 - 12 7 fL   APTT    Collection Time: 11/14/22  6:00 AM   Result Value Ref Range    PTT 89 (H) 23 - 37 seconds       XR chest portable    Result Date: 11/3/2022  Narrative: CHEST INDICATION:   Cough, tachycardia, weakness  COMPARISON:  Abdomen CT 11/3/2022, CXR 3/20/2006  EXAM PERFORMED/VIEWS:  XR CHEST PORTABLE FINDINGS: Cardiomediastinal silhouette appears unremarkable  2 6 cm solid left lower lobe mass corresponding with the abdomen CT   4 cm cavitary right upper lobe mass  No acute disease  No effusion or pneumothorax  Left lateral 6th rib metastasis corresponding with the abdomen CT probable posterior left 5th rib metastasis  Impression: Right upper lobe cavitary mass, left lower lobe mass, and left rib metastases, some which are visible on the abdomen CT  Findings on the abdomen CT were reported to the emergency physician by Dr Fernandez Thibodeaux  Workstation performed: [id number]     MRI lumbar spine w wo contrast    Result Date: 11/6/2022  Narrative: MRI LUMBAR SPINE WITH AND WITHOUT CONTRAST INDICATION: Lower extremity weakness in patient with lytic bone lesion concerning for metastasis  COMPARISON:  CTA chest 11/3/2022 TECHNIQUE:  Sagittal T1, sagittal T2, sagittal inversion recovery, axial T1 and axial T2, coronal T2  Sagittal and axial T1 postcontrast  IV Contrast:  7 mL of Gadobutrol injection (SINGLE-DOSE) IMAGE QUALITY:  Diagnostic FINDINGS: VERTEBRAL BODIES:  There are 5 lumbar type vertebral bodies  Normal alignment of the lumbar spine  No spondylolysis or spondylolisthesis  No scoliosis  No compression fracture  Diffuse marrow signal heterogeneity with superimposed scattered enhancing, hypointense T1/T2 lesions suspicious for osseous metastases    The most prominent lesions are measured :Diffuse lesion involving the T12 vertebral body, 2 5 cm lesion anterior L2 vertebral body, 2 1 cm lesion involving the right L3 vertebral body and pedicle, 2 cm lesions involving the posterior superior L4 vertebral body  SACRUM:  Hypointense T1/T2 lesions involving the posterior iliac bones, partially imaged  DISTAL CORD AND CONUS:  Normal size and signal within the distal cord and conus  The conus terminates at the L1 level  The cauda equina nerve roots appear within normal limits  PARASPINAL SOFT TISSUES:  Mild edema within the posterior paraspinal muscles  LOWER THORACIC DISC SPACES:  Normal disc height and signal   No disc herniation, canal stenosis or foraminal narrowing  LUMBAR DISC SPACES: L1-2: No focal disc herniation, central canal stenosis, or neural foraminal narrowing  L2-3: Bilateral foraminal protrusions  No central canal or subarticular/lateral recess narrowing  No neural foraminal stenosis  L3-4: Disc bulge extending into the foraminal regions  Bilateral ligamentum flavum and facet hypertrophy  No central canal or neural foraminal stenosis  L4-5: Disc bulge extending into the foraminal regions  Bilateral ligamentum flavum and facet hypertrophy  No central canal or neural foraminal stenosis  L5-S1: Moderate diffuse disc bulge with spondylotic ridging  No central canal stenosis  Moderate bilateral subarticular/lateral recess narrowing  Moderate to severe bilateral neural foraminal stenosis with encroachment of the exiting L5 nerve roots  Visualized abdominal and pelvic contents: No suspicious abnormality  Impression: 1  Scattered enhancing osseous metastases throughout the lumbar spine most notable at the T12 and L2-L4 vertebral bodies  Bilateral iliac bone lesions, partially imaged  2  There is no pathologic compression fracture  3  Moderate L5-S1 disc bulge with moderate bilateral subarticular/lateral recess narrowing    Moderate to severe bilateral L5-S1 neural foraminal narrowing with encroachment of the exiting L5 nerve roots  Workstation performed: [id number]     CTA chest pe study    Result Date: 11/3/2022  Narrative: CTA - CHEST WITH IV CONTRAST - PULMONARY ANGIOGRAM INDICATION:   b/l DVTs, new lung cancer, looking for PE but also further staging of malignancy  COMPARISON: CXR 11/2/2022 and abdomen CT 11/3/2022  Small pleural effusions, larger on the left  TECHNIQUE: CT angiogram timed for optimal opacification of the pulmonary arteries  Axial, sagittal, and coronal 2D reformats created from source data  Coronal 3D MIP postprocessing on the acquisition scanner  Radiation dose length product (DLP):  315 28 mGy-cm   Radiation dose exposure minimized using iterative reconstruction and automated exposure control  IV Contrast:  85 mL of iohexol (OMNIPAQUE)  FINDINGS: PULMONARY ARTERIES:  A few small subsegmental emboli in the right middle lobe, right lower lobe, and inferior lingula, marked on series 2  LUNGS:  3 6 cm cavitary thick-walled mass in the right upper lobe (3/31)  2 8 cm necrotic nodule in the posterior basal left lower lobe (3/69)  Mild emphysema  Minimal dependent atelectasis in the lower lobes  AIRWAYS: No significant filling defects  PLEURA:  Small effusions, greater on the left  HEART/GREAT VESSELS:  Normal heart size  RV/LV diameter ratio less than 1 with nothing to indicate right heart strain  Mild coronary artery calcification indicating atherosclerotic heart disease  Trace pericardial effusion  MEDIASTINUM AND JAYESH:  Ill-defined infiltration of the mediastinal fat  Enlarged left hilar node with a short axis of 1 6 cm  CHEST Unremarkable  R NECK: Unremarkable  UPPER ABDOMEN:  Persistent gallbladder distention, incompletely imaged  OSSEOUS STRUCTURES:  Lytic metastasis to the left 5th rib posteriorly and laterally and to the lateral 6th rib  Question metastatic disease to the left posterior 8th rib at the costovertebral junction    Probable sclerotic metastasis to the lateral right  6th rib  Moderate compression deformities of T6 and T8, likely due to metastatic disease, with mild retropulsion of bone into the spinal canal at the level of T8  Impression: A few small subsegmental emboli in the right middle lobe, right lower lobe, and inferior lingula  3 6 cm thick walled cavitary right upper lobe tumor and 2 8 cm necrotic left lower lobe tumor  Ill-defined infiltration of the mediastinal fat likely due to tumor with enlarged left hilar node, likely metastatic  Small effusions  Metastatic disease to bilateral ribs and T6 and T8 with severe compression deformities  Given mild retropulsion of bone into the spinal canal at the level of T8, recommend further evaluation with MRI to evaluate for cord compression  Persistent gallbladder distention, incompletely imaged  I notified Lea Doyle on 11/3/2022 at 8:51 PM by secure text and she responded immediately  Workstation performed: [id number]     US right upper quadrant    Result Date: 11/3/2022  Narrative: RIGHT UPPER QUADRANT ULTRASOUND INDICATION:     distended gallbladder, elevated tbili and alk phos  COMPARISON:  11/3/2022 TECHNIQUE:   Real-time ultrasound of the right upper quadrant was performed with a curvilinear transducer with both volumetric sweeps and still imaging techniques  FINDINGS: PANCREAS:  There is mild prominence of the pancreatic duct which is likely related to partial divisum on CT  AORTA AND IVC:  Visualized portions are normal for patient age  LIVER: Size:  Moderately enlarged  The liver measures 21 0 cm in the midclavicular line  Contour:  Surface contour is smooth  Parenchyma:  Echogenicity and echotexture are within normal limits  No liver mass identified  Limited imaging of the main portal vein shows it to be patent and hepatopetal   BILIARY: The gallbladder is distended  Wall is top normal in caliber though without wall edema  There is trace pericholecystic fluid   There is layering sludge without evidence for shadowing calculi  Sonographic Emerson's sign cannot be accurately assessed because patient had recent pain medication administration, thus limiting ultrasound assessment of acute cholecystitis  No intrahepatic biliary dilatation  CBD measures 5 0 mm  No choledocholithiasis  KIDNEY: Right kidney measures 11 5 x 6 1 x 6 1 cm  Volume 224 8 mL Kidney within normal limits  ASCITES:   None  Impression: Distended gallbladder with sludge, top normal wall and trace pericholecystic fluid though no wall edema or stones and Emerson's sign could not be assessed due to medication  These findings may represent acute cholecystitis in the appropriate clinical setting though HIDA scan can be performed for confirmation of clinically indicated  The study was marked in [facility] for immediate notification  Workstation performed: [id number]     VAS lower limb venous duplex study, complete bilateral    Result Date: 11/3/2022  Narrative:  [facility] REPORT CLINICAL: Indications: Patient presents with bilateral pedal edema x  1 week  FINDINGS:  Segment        Right                   Left                          Impression              Impression              Gastrocnemius  Occlusive Subsegmental  Occlusive Subsegmental  Calf Veins     Occlusive Subsegmental                             CONCLUSION:  Impression: RIGHT LOWER LIMB: Evaluation shows acute occlusive thrombus in the gastrocnemius veins from behind the knee to proximal calf and in the soleal vein from proximal to mid calf  No evidence of superficial thrombophlebitis noted  Doppler evaluation shows a normal response to augmentation maneuvers  Popliteal, posterior tibial and anterior tibial arterial Doppler waveforms are triphasic  LEFT LOWER LIMB: Evaluation shows acute occlusive thrombus in the gastrocnemius veins from behind the knee to proximal calf  No evidence of superficial thrombophlebitis noted   Doppler evaluation shows a normal response to augmentation maneuvers  Popliteal, posterior tibial and anterior tibial arterial Doppler waveforms are triphasic  Technical findings were given to GALEN Knox on 11/3/2022 @ 1045  SIGNATURE: Electronically Signed by: Cristino Augustine on 2022-11-03 02:12:15 PM    CT abdomen pelvis with contrast    Result Date: 11/3/2022  Narrative: CT ABDOMEN AND PELVIS WITH IV CONTRAST INDICATION:   Sepsis Sepsis rule out infection  Poor historian  COMPARISON:  CT of abdomen pelvis on March 20, 2006  TECHNIQUE:  CT examination of the abdomen and pelvis was performed  Axial, sagittal, and coronal 2D reformatted images were created from the source data and submitted for interpretation  Radiation dose length product (DLP) for this visit:  487 6 mGy-cm   This examination, like all CT scans performed in the [facility], was performed utilizing techniques to minimize radiation dose exposure, including the use of iterative reconstruction and automated exposure control  IV Contrast:  100 mL of iohexol (OMNIPAQUE) Enteric Contrast:  Enteric contrast was not administered  FINDINGS: ABDOMEN LOWER CHEST:  2 5 x 2 2 x 2 2 cm mass in the left lower lobe (series 2, image 6) concerning for malignancy  There is a small left pleural effusion with adjacent atelectasis  There is a small anterior pericardial effusion  Coronary artery calcifications  LIVER/BILIARY TREE:  Unremarkable  GALLBLADDER:  Distended  No calcified gallstones  No pericholecystic inflammatory change  SPLEEN:  Unremarkable  PANCREAS:  Unremarkable  ADRENAL GLANDS:  Unremarkable  KIDNEYS/URETERS:  There are small wedge-shaped hypodensities measuring up to 1 3 cm within the lower pole of the right kidney and the upper pole of the left kidney which may be due to scarring versus small renal infarctions  No hydronephrosis  STOMACH AND BOWEL:  Unremarkable  APPENDIX:  No findings to suggest appendicitis  ABDOMINOPELVIC CAVITY:  No ascites  No pneumoperitoneum  No lymphadenopathy  VESSELS:  Moderate atherosclerotic disease of the abdominal aorta  There is narrowing of the distal abdominal aorta, the lumen measuring up to 8 mm in diameter just prior to the bifurcation  PELVIS REPRODUCTIVE ORGANS:  Enlarged prostate  URINARY BLADDER:  Unremarkable  ABDOMINAL WALL/INGUINAL REGIONS:  Unremarkable  OSSEOUS STRUCTURES:  There are lytic lesions compatible with metastasis  For example, there is a lytic lesion measuring 1 9 cm in the left iliac bone (series 2, image 57)  Additional smaller lytic lesions are seen in the left iliac bone (series 2, image 53)  A small lytic lesion seen within the left sacral ala  2 6 x 1 9 cm lytic expansile lesion in the left lateral 6th rib  Possible vague lytic lesions are seen within the L4 vertebral body  Impression: 1   2 5 x 2 2 x 2 2 cm mass in the left lower lobe concerning for lung cancer  2   Lytic lesions within the left 6th rib and pelvis bones compatible with osseous metastasis  3   2   Small left pleural effusion  4   3   Small anterior pericardial effusion  5   Small wedge-shaped hypodensities within bilateral kidneys may reflect scarring versus small renal infarcts  6   Other findings as above  I personally discussed this study with Dr Ramy Kohli on 11/3/2022 at 1:54 AM  Workstation performed: [id number]     Echo complete w/ contrast if indicated    Result Date: 11/4/2022  Narrative: •  Left Ventricle: Left ventricular cavity size is normal  Wall thickness is normal  The left ventricular ejection fraction is 65% by visual estimation  Wall motion is normal  Diastolic function is normal  •  Pericardium: There is a small pericardial effusion along the right atrial free wall  There is a small likelihood of cardiac tamponade  The evidence for tamponade includes: right atrial inversion  I have personally reviewed labs, imaging studies, and pertinent reports  This note has been generated by voice recognition software system  Therefore, there may be spelling, grammar, and or syntax errors  Please contact if questions arise

## 2022-11-14 NOTE — PLAN OF CARE
Problem: Prexisting or High Potential for Compromised Skin Integrity  Goal: Skin integrity is maintained or improved  Description: INTERVENTIONS:  - Identify patients at risk for skin breakdown  - Assess and monitor skin integrity  - Assess and monitor nutrition and hydration status  - Monitor labs   - Assess for incontinence   - Turn and reposition patient  - Assist with mobility/ambulation  - Relieve pressure over bony prominences  - Avoid friction and shearing  - Provide appropriate hygiene as needed including keeping skin clean and dry  - Evaluate need for skin moisturizer/barrier cream  - Collaborate with interdisciplinary team   - Patient/family teaching  - Consider wound care consult   Outcome: Progressing     Problem: PAIN - ADULT  Goal: Verbalizes/displays adequate comfort level or baseline comfort level  Description: Interventions:  - Encourage patient to monitor pain and request assistance  - Assess pain using appropriate pain scale  - Administer analgesics based on type and severity of pain and evaluate response  - Implement non-pharmacological measures as appropriate and evaluate response  - Consider cultural and social influences on pain and pain management  - Notify physician/advanced practitioner if interventions unsuccessful or patient reports new pain  Outcome: Progressing     Problem: Nutrition/Hydration-ADULT  Goal: Nutrient/Hydration intake appropriate for improving, restoring or maintaining nutritional needs  Description: Monitor and assess patient's nutrition/hydration status for malnutrition  Collaborate with interdisciplinary team and initiate plan and interventions as ordered  Monitor patient's weight and dietary intake as ordered or per policy  Utilize nutrition screening tool and intervene as necessary  Determine patient's food preferences and provide high-protein, high-caloric foods as appropriate       INTERVENTIONS:  - Monitor oral intake, urinary output, labs, and treatment plans  - Assess nutrition and hydration status and recommend course of action  - Evaluate amount of meals eaten  - Assist patient with eating if necessary   - Allow adequate time for meals  - Recommend/ encourage appropriate diets, oral nutritional supplements, and vitamin/mineral supplements  - Order, calculate, and assess calorie counts as needed  - Recommend, monitor, and adjust tube feedings and TPN/PPN based on assessed needs  - Assess need for intravenous fluids  - Provide specific nutrition/hydration education as appropriate  - Include patient/family/caregiver in decisions related to nutrition  Outcome: Progressing

## 2022-11-14 NOTE — ASSESSMENT & PLAN NOTE
· Distended gallbladder on CT scan   · Obtained right upper quadrant ultrasound patient also with elevated T bili and alk-phos  · Completed zosyn  No acute surgical needs by general surgery   No suspicion for cholecystitis per surg    F/u with surgery outpt

## 2022-11-14 NOTE — ASSESSMENT & PLAN NOTE
· Presents with generalized weakness, fatigue, and weight loss  · CT a/P: " 2 5 x 2 2 x 2 2 cm mass in the left lower lobe concerning for lung cancer  Lytic lesions within the left 6th rib and pelvis bones compatible with osseous metastasis "  · Longstanding smoking history  · Will ask pulm to evaluate  · Heme-Onc recommended tissue diagnosis with complete staging    They recommended patient will need anticoagulation which will be lifelong based on probable stage IV malignancy causing hypercoagulable state  · IR tissue biopsy performed - 11/8- prelim positive for metastatic lung adenocarcinoma  · D/w patient friend Phyllis Albrecht on 11/12, wishes to hear prognostic inpt/rx options from oncology  · Palliative care is on board

## 2022-11-14 NOTE — ASSESSMENT & PLAN NOTE
Lab Results   Component Value Date    HGBA1C 9 1 (H) 11/02/2022       Recent Labs     11/13/22  1226 11/13/22  1641 11/13/22 2051 11/14/22  0728   POCGLU 166* 236* 176* 163*       Blood Sugar Average: Last 72 hrs:  (P) 225 [id number]     See mgx of elevated glucose

## 2022-11-14 NOTE — ASSESSMENT & PLAN NOTE
· PTH low, hypercalcemia of malignancy is etiology   · apprec nephrology assistance  · S/p IVF, lasix, zometa     · resolved

## 2022-11-14 NOTE — PROGRESS NOTES
[place]  Progress Note Mary Alvarezs 1960, 58 y o  male MRN: [id number]  Unit/Bed#: -01 Encounter: [id number]  Primary Care Provider: No primary care provider on file  Date and time admitted to hospital: 11/2/2022  9:13 PM    * Suspected Primary malignant neoplasm of left lung metastatic to other site [facility])  Assessment & Plan  · Presents with generalized weakness, fatigue, and weight loss  · CT a/P: " 2 5 x 2 2 x 2 2 cm mass in the left lower lobe concerning for lung cancer  Lytic lesions within the left 6th rib and pelvis bones compatible with osseous metastasis "  · Longstanding smoking history  · Will ask pulm to evaluate  · Heme-Onc recommended tissue diagnosis with complete staging    They recommended patient will need anticoagulation which will be lifelong based on probable stage IV malignancy causing hypercoagulable state  · IR tissue biopsy performed - 11/8- prelim positive for metastatic lung adenocarcinoma  · D/w patient friend Ismael Kerns on 11/12, wishes to hear prognostic inpt/rx options from oncology  · Palliative care is on board    Type 2 diabetes mellitus without complication, without long-term current use of insulin [facility])  Assessment & Plan  Lab Results   Component Value Date    HGBA1C 9 1 (H) 11/02/2022       Recent Labs     11/13/22  1226 11/13/22  1641 11/13/22  2051 11/14/22  0728   POCGLU 166* 236* 176* 163*       Blood Sugar Average: Last 72 hrs:  (P) 225 [id number]     See mgx of elevated glucose    Thrombocytopenia (HCC)  Assessment & Plan  D/w hematology on call  Will transition to argatroban Gtt until HIT is ruled out  Monitor platelets    Severe protein-calorie malnutrition (HCC)  Assessment & Plan  Malnutrition Findings:   Adult Malnutrition type: Chronic illness  Adult Degree of Malnutrition: Other severe protein calorie malnutrition  Malnutrition Characteristics: Fat loss, Muscle loss                  360 Statement: Pt presents with severe protein calorie malnutrition as evidenced by orbital hollowing, temporal scooping and prominent clavicle bone protrusion  BMI Findings: Body mass index is 23 63 kg/m²  Anemia  Assessment & Plan  Severe anemia, progressive   Likely related to malignancy  No sx of bleeding  Transfused 1 unit with improvement       Pedal edema  Assessment & Plan  · B/l pedal edema (R>L) for a couple of days   · Due to probable newly diagnosed metastatic cancer on CT scan, which demonstrated DVTs     Hyponatremia  Assessment & Plan  · resolved    Elevated glucose  Assessment & Plan  · Serum glucose on admission is 233   · No prior reported history of DM, however patient poor historian  · hemoglobin A1c 9 1  · started on insulin regimen, increased w improvement  · ISS  · DM diet  · Endo referral     Schizophrenia ([facility] [address] )  Assessment & Plan  · Self-reported history of schizophrenia   · Reportedly treated with clonazepam 100 mg HS, however patient reports non adherence with this   · Presented to the ED with acute psychosis requiring Haldol 5 mg IM x1  · Neuro- psychiatry was consulted because of question about patient's capacity  Patient did not seem to have insight into his condition  He does not have capacity to make medical decisions at this time  Abnormal CT scan, gallbladder  Assessment & Plan  · Distended gallbladder on CT scan   · Obtained right upper quadrant ultrasound patient also with elevated T bili and alk-phos  · Completed zosyn  No acute surgical needs by general surgery  No suspicion for cholecystitis per surg    F/u with surgery outpt    SIRS (systemic inflammatory response syndrome) (HCC)  Assessment & Plan  · Sirs criteria met on admission:  Tachycardia and leukocytosis at 17 36 K without bandemia   · No severe sepsis criteria met   · Suspect reactive from suspected metastatic lung cancer as seen on CT scan   · UA without infection   · Procalcitonin elevated at 11 67-> 3    · ?   Elevation secondary to infection or possible small cell lung cancer  · CT scan does show distended gallbladder and with elevated T bili and alk-phos, received IV Zosyn to cover for possible infection  Surgery suggests no acute surgical intervention  · Blood cultures x2, neg  · apprec ID input  · Fever curve improving  Observing off abx  Fevers felt 2/2 malignancy, dvt etc       Hypokalemia-resolved as of 2022  Assessment & Plan  Replete aggressively  resolved    Hypercalcemia-resolved as of 2022  Assessment & Plan  · PTH low, hypercalcemia of malignancy is etiology   · apprec nephrology assistance  · S/p IVF, lasix, zometa  · resolved      VTE Pharmacologic Prophylaxis: VTE Score: 4 Moderate Risk (Score 3-4) - Pharmacological DVT Prophylaxis Ordered: argatroban  Patient Centered Rounds: I performed bedside rounds with nursing staff today  Discussions with Specialists or Other Care Team Provider: h/o, CM, PT, OT    Education and Discussions with Family / Patient: Updated  (friend) via phone  Time Spent for Care: 45 minutes  More than 50% of total time spent on counseling and coordination of care as described above  Current Length of Stay: 11 day(s)  Current Patient Status: Inpatient   Certification Statement: The patient will continue to require additional inpatient hospital stay due to [address] drip  Discharge Plan: Anticipate discharge in 24-48 hrs to rehab facility  Code Status: Level 1 - Full Code    Subjective:   Rodney Hardin was seen and examined at bedside  No acute events overnight  Patient is a poor historian however has no complaints at this time  Objective:     Vitals:   Temp (24hrs), Av °F (36 7 °C), Min:97 9 °F (36 6 °C), Max:98 3 °F (36 8 °C)    Temp:  [97 9 °F (36 6 °C)-98 3 °F (36 8 °C)] 97 9 °F (36 6 °C)  HR:  [109-116] 109  Resp:  [20] 20  BP: (102-141)/(66-87) 141/87  SpO2:  [92 %-96 %] 95 %  Body mass index is 23 63 kg/m²       Input and Output Summary (last 24 hours): Intake/Output Summary (Last 24 hours) at 11/14/2022 1036  Last data filed at 11/14/2022 6251  Gross per 24 hour   Intake 360 ml   Output 900 ml   Net -540 ml       Physical Exam:   Physical Exam  Vitals and nursing note reviewed  Constitutional:       Appearance: He is not ill-appearing  Comments: Thin frail   HENT:      Head: Normocephalic and atraumatic  Cardiovascular:      Rate and Rhythm: Regular rhythm  Tachycardia present  Pulses: Normal pulses  Heart sounds: Normal heart sounds  Pulmonary:      Effort: Pulmonary effort is normal       Breath sounds: Normal breath sounds  Abdominal:      General: Abdomen is flat  Bowel sounds are normal       Palpations: Abdomen is soft  Musculoskeletal:      Right lower leg: No edema  Left lower leg: No edema  Skin:     General: Skin is warm  Neurological:      Mental Status: He is alert  Mental status is at baseline            Additional Data:     Labs:  Results from last 7 days   Lab Units 11/14/22  0507 11/13/22  0938   WBC Thousand/uL 12 87* 12 61*   HEMOGLOBIN g/dL 7 9* 8 3*   HEMATOCRIT % 24 0* 24 7*   PLATELETS Thousands/uL 68* 32*   NEUTROS PCT %  --  80*   LYMPHS PCT %  --  11*   MONOS PCT %  --  8   EOS PCT %  --  0     Results from last 7 days   Lab Units 11/13/22  0938   SODIUM mmol/L 138   POTASSIUM mmol/L 3 5   CHLORIDE mmol/L 102   CO2 mmol/L 30   BUN mg/dL 18   CREATININE mg/dL 1 02   ANION GAP mmol/L 6   CALCIUM mg/dL 8 2*   ALBUMIN g/dL 2 0*  1 9*   TOTAL BILIRUBIN mg/dL 0 60  0 60   ALK PHOS U/L 86  82   ALT U/L 14  16   AST U/L 15  12   GLUCOSE RANDOM mg/dL 152*     Results from last 7 days   Lab Units 11/13/22  0938   INR  1 14     Results from last 7 days   Lab Units 11/14/22  0728 11/13/22  2051 11/13/22  1641 11/13/22  1226 11/13/22  0732 11/12/22  2015 11/12/22  1601 11/12/22  1251 11/12/22  0748 11/11/22  2116 11/11/22  1558 11/11/22  1113   POC GLUCOSE mg/dl 163* 176* 236* 166* 163* 174* 246* 324* 334* 297* 274* 245*               Lines/Drains:  Invasive Devices  Report    Peripheral Intravenous Line  Duration           Peripheral IV 11/13/22 Left;Upper;Ventral (anterior) Arm 1 day                      Imaging: Reviewed radiology reports from this admission including: chest xray, chest CT scan, abdominal/pelvic CT, MRI brain, MRI spine, procedure reports and ultrasound(s)    Recent Cultures (last 7 days):   Results from last 7 days   Lab Units 11/09/22  0352   BLOOD CULTURE  No Growth After 5 Days  No Growth After 5 Days         Last 24 Hours Medication List:   Current Facility-Administered Medications   Medication Dose Route Frequency Provider Last Rate   • acetaminophen  650 mg Oral Q8H PRN Jennifer Chen PA-C     • argatroban  0 1-3 mcg/kg/min Intravenous Titrated Gi Watters MD 1 mcg/kg/min (11/14/22 3228)   • HYDROmorphone  0 5 mg Intravenous Q3H PRN Gi Watters MD     • influenza vaccine  0 5 mL Intramuscular Prior to discharge LEILA RossiC     • insulin glargine  13 Units Subcutaneous HS Ira Mckeon MD     • insulin lispro  1-5 Units Subcutaneous TID AC LEILA RossiC     • insulin lispro  1-5 Units Subcutaneous HS Jennifer Chen PAYinaC     • insulin lispro  5 Units Subcutaneous TID With Meals Gi Watters MD     • lidocaine  1 patch Topical Daily Stella Uriarte MD     • lidocaine  1 patch Topical Daily Stella Uriarte MD     • nicotine  1 patch Transdermal Daily Jennifer Chen PAYinaC     • OLANZapine  5 mg Oral HS Stella Uriarte MD     • ondansetron  4 mg Intravenous Q6H PRN Jennifer Chen PA-C     • oxyCODONE  10 mg Oral Q4H PRN Gi Watters MD     • oxyCODONE  5 mg Oral Q6H PRN Gi Watters MD     • senna  1 tablet Oral HS PRN Jennifer Chen PA-C          Today, Patient Was Seen By: Ira Mckeon    **Please Note: This note may have been constructed using a voice recognition system  **

## 2022-11-14 NOTE — ASSESSMENT & PLAN NOTE
· Serum glucose on admission is 233   · No prior reported history of DM, however patient poor historian  · hemoglobin A1c 9 1  · started on insulin regimen, increased w improvement  · ISS  · DM diet  · Endo referral

## 2022-11-14 NOTE — CONSULTS
Consultation - Laury Garcia 58 y o  male MRN: [id number]    Unit/Bed#: -01 Encounter: [id number]      Assessment/Plan     Assessment: This is a 58y o -year-old male with diabetes with hyperglycemia  1  Type 2 diabetes, insulin requiring   Newly diagnosed with hgba1c of 9 1% demonstrating uncontrolled diabetes  Would continue on insulin therapy basal/bolus regimen for now  Have increased humalog insulin  2  Metastatic adenocarcinoma  States he does no believe he has stage 4 cancer  He wants out of here and is angry and says he is being held against his will  Perhaps patient advocate would be of help in addition to neuropsych eval      3  Hypercalcemia of malignancy  Calcium mush better post zometa IV infusion  Plan:  1  continue lantus insulin 13 units daily  2  Increase Humalog insulin to 7 units with each meal with sliding scale  3  Continue QID blood sugar testing  CC: Diabetes Consult    History of Present Illness     HPI: Laury Garcia is a 58y o  year old male with type 2 diabetes, newly diagnosed on this admission with a hgba1c of 9 1%  He was admitted and found to have metastatic adenocarcinoma and hypercalcemia of malignancy  He received IV zomerta and calcium is improved  He is now on basal/bolus insulin regimen  He is on No medications at home  He denies any polyuria, polydipsia, nocturia and blurry vision  He has slight numbness and tingling of the feet  He denies chest pain or shortness of breath  He denies neuropathy, nephropathy, retinopathy, heart attack, stroke and claudication but does admit to none  He denies any hypoglycemia  Inpatient consult to Endocrinology  Consult performed by: Tamanna Henderson MD  Consult ordered by: Marcelino Bolton MD          Review of Systems   Constitutional: Positive for fatigue and unexpected weight change  25 lbs weight loss in 2 months  Eyes: Negative for visual disturbance     Respiratory: Negative for chest tightness and shortness of breath  Cardiovascular: Negative for chest pain  Gastrointestinal: Negative for abdominal pain and nausea  Endocrine: Negative for polydipsia, polyphagia and polyuria  Denies nocturia  Skin: Negative for wound  Neurological: Positive for numbness  Negative for dizziness, weakness, light-headedness and headaches  Some numbness and tingling of the feet  Psychiatric/Behavioral: Negative for sleep disturbance  Does not believe he has cancer   thinks he is being held against his will  Historical Information   Past Medical History:   Diagnosis Date   • Bladder infection      Past Surgical History:   Procedure Laterality Date   • IR BIOPSY BONE  11/8/2022   • IR THORACENTESIS  11/8/2022     Social History   Social History     Substance and Sexual Activity   Alcohol Use Not Currently     Social History     Substance and Sexual Activity   Drug Use Never     Social History     Tobacco Use   Smoking Status Current Every Day Smoker   • Packs/day: 1 00   • Types: Cigarettes   Smokeless Tobacco Never Used     Family History: History reviewed  No pertinent family history      Meds/Allergies   Current Facility-Administered Medications   Medication Dose Route Frequency Provider Last Rate Last Admin   • acetaminophen (TYLENOL) tablet 650 mg  650 mg Oral Q8H PRN Jesus Junior PA-C   650 mg at 11/14/22 1225   • argatroban infusion (premix)  0 1-3 mcg/kg/min Intravenous Titrated Shen Robledo MD 4 4 mL/hr at 11/14/22 1222 1 mcg/kg/min at 11/14/22 1222   • HYDROmorphone (DILAUDID) injection 0 5 mg  0 5 mg Intravenous Q3H PRN Shen Robledo MD   0 5 mg at 11/14/22 1628   • influenza vaccine, recombinant, quadrivalent (FLUBLOK) IM injection 0 5 mL  0 5 mL Intramuscular Prior to discharge Jesus Junior PA-C       • insulin glargine (LANTUS) subcutaneous injection 13 Units 0 13 mL  13 Units Subcutaneous HS Marcelino Bolton MD       • insulin lispro (HumaLOG) 100 units/mL subcutaneous injection 1-5 Units  1-5 Units Subcutaneous TID AC ZOILA Vickers-C   2 Units at 11/14/22 1236   • insulin lispro (HumaLOG) 100 units/mL subcutaneous injection 1-5 Units  1-5 Units Subcutaneous HS Comfort Hernandez PA-C   1 Units at 11/13/22 2116   • [START ON 11/15/2022] insulin lispro (HumaLOG) 100 units/mL subcutaneous injection 7 Units  7 Units Subcutaneous TID With Meals Sy Huerta MD       • lidocaine (LIDODERM) 5 % patch 1 patch  1 patch Topical Daily Suize Menjivar MD   1 patch at 11/11/22 0906   • lidocaine (LIDODERM) 5 % patch 1 patch  1 patch Topical Daily Suzie Menjivar MD   1 patch at 11/11/22 0906   • nicotine (NICODERM CQ) 21 mg/24 hr TD 24 hr patch 1 patch  1 patch Transdermal Daily Comfort Hernandez PA-C   1 patch at 11/13/22 5517   • OLANZapine (ZyPREXA) tablet 5 mg  5 mg Oral HS Suzie Menjivar MD   5 mg at 11/13/22 2116   • ondansetron (ZOFRAN) injection 4 mg  4 mg Intravenous Q6H PRN LEILA VickersC       • oxyCODONE (ROXICODONE) IR tablet 10 mg  10 mg Oral Q4H PRN Shonna Sykes MD   10 mg at 11/14/22 3467   • oxyCODONE (ROXICODONE) IR tablet 5 mg  5 mg Oral Q6H PRN Shonna Sykes MD       • senna (SENOKOT) tablet 8 6 mg  1 tablet Oral HS PRN Comfort Hernandez PA-C         No Known Allergies    Objective   Vitals: Blood pressure 100/65, pulse (!) 111, temperature 98 °F (36 7 °C), resp  rate 19, height 5' 9" (1 753 m), weight 72 6 kg (160 lb), SpO2 97 %  Intake/Output Summary (Last 24 hours) at 11/14/2022 1757  Last data filed at 11/14/2022 0802  Gross per 24 hour   Intake 360 ml   Output 750 ml   Net -390 ml     Invasive Devices  Report    Peripheral Intravenous Line  Duration           Peripheral IV 11/13/22 Left;Upper;Ventral (anterior) Arm 1 day              Physical exam was not performed except just by observation in talking to him as he refused to let me examine him  Physical Exam  Vitals reviewed  Constitutional:       Appearance: He is well-developed  He is ill-appearing  HENT:      Head: Normocephalic and atraumatic  Eyes:      Conjunctiva/sclera: Conjunctivae normal    Neck:      Thyroid: No thyromegaly  Musculoskeletal:         General: No deformity  Normal range of motion  Cervical back: Normal range of motion  Right lower leg: No edema  Left lower leg: No edema  Neurological:      Mental Status: He is alert  Deep Tendon Reflexes: Reflexes are normal and symmetric  Comments: Oriented that he is the hospital and that someone told him he has stage 4 cancer and does not believe it  Says he is being held against his will here and wants to go home  The history was obtained from the review of the chart, patient  Lab Results:     hgba1c on 11/2/2022 was 9 1%  Lab Results   Component Value Date    WBC 12 87 (H) 11/14/2022    HGB 7 9 (L) 11/14/2022    HCT 24 0 (L) 11/14/2022    MCV 96 11/14/2022    PLT 68 (L) 11/14/2022     Lab Results   Component Value Date/Time    BUN 18 11/13/2022 09:38 AM    K 3 5 11/13/2022 09:38 AM     11/13/2022 09:38 AM    CO2 30 11/13/2022 09:38 AM    CREATININE 1 02 11/13/2022 09:38 AM    AST 12 11/13/2022 09:38 AM    AST 15 11/13/2022 09:38 AM    ALT 16 11/13/2022 09:38 AM    ALT 14 11/13/2022 09:38 AM    ALB 1 9 (L) 11/13/2022 09:38 AM    ALB 2 0 (L) 11/13/2022 09:38 AM     No results for input(s): CHOL, HDL, LDL, TRIG, VLDL in the last 72 hours  No results found for: Jose Alejandro Chappell  POC Glucose (mg/dl)   Date Value   11/14/2022 284 (H)   11/14/2022 235 (H)   11/14/2022 163 (H)   11/13/2022 176 (H)   11/13/2022 236 (H)   11/13/2022 166 (H)   11/13/2022 163 (H)   11/12/2022 174 (H)   11/12/2022 246 (H)   11/12/2022 324 (H)       Imaging Studies: I have personally reviewed pertinent reports  Portions of the record may have been created with voice recognition software

## 2022-11-15 PROBLEM — D63.8 ANEMIA OF CHRONIC DISEASE: Status: ACTIVE | Noted: 2022-11-10

## 2022-11-15 PROBLEM — E87.1 HYPONATREMIA: Status: RESOLVED | Noted: 2022-11-03 | Resolved: 2022-11-15

## 2022-11-15 LAB
ALBUMIN SERPL BCP-MCNC: 1.9 G/DL (ref 3.5–5)
ALP SERPL-CCNC: 93 U/L (ref 46–116)
ALT SERPL W P-5'-P-CCNC: 17 U/L (ref 12–78)
ANION GAP SERPL CALCULATED.3IONS-SCNC: 2 MMOL/L (ref 4–13)
APTT PPP: 52 SECONDS (ref 23–37)
APTT PPP: 79 SECONDS (ref 23–37)
APTT PPP: 95 SECONDS (ref 23–37)
AST SERPL W P-5'-P-CCNC: 20 U/L (ref 5–45)
BASOPHILS # BLD AUTO: 0.04 THOUSANDS/ÂΜL (ref 0–0.1)
BASOPHILS NFR BLD AUTO: 0 % (ref 0–1)
BILIRUB SERPL-MCNC: 0.5 MG/DL (ref 0.2–1)
BUN SERPL-MCNC: 23 MG/DL (ref 5–25)
CALCIUM ALBUM COR SERPL-MCNC: 10 MG/DL (ref 8.3–10.1)
CALCIUM SERPL-MCNC: 8.3 MG/DL (ref 8.3–10.1)
CHLORIDE SERPL-SCNC: 100 MMOL/L (ref 96–108)
CO2 SERPL-SCNC: 29 MMOL/L (ref 21–32)
CREAT SERPL-MCNC: 0.9 MG/DL (ref 0.6–1.3)
EOSINOPHIL # BLD AUTO: 0.1 THOUSAND/ÂΜL (ref 0–0.61)
EOSINOPHIL NFR BLD AUTO: 1 % (ref 0–6)
ERYTHROCYTE [DISTWIDTH] IN BLOOD BY AUTOMATED COUNT: 16.1 % (ref 11.6–15.1)
GFR SERPL CREATININE-BSD FRML MDRD: 91 ML/MIN/1.73SQ M
GLUCOSE SERPL-MCNC: 111 MG/DL (ref 65–140)
GLUCOSE SERPL-MCNC: 155 MG/DL (ref 65–140)
GLUCOSE SERPL-MCNC: 166 MG/DL (ref 65–140)
GLUCOSE SERPL-MCNC: 173 MG/DL (ref 65–140)
GLUCOSE SERPL-MCNC: 191 MG/DL (ref 65–140)
GLUCOSE SERPL-MCNC: 191 MG/DL (ref 65–140)
HCT VFR BLD AUTO: 23.8 % (ref 36.5–49.3)
HGB BLD-MCNC: 8 G/DL (ref 12–17)
IMM GRANULOCYTES # BLD AUTO: 0.22 THOUSAND/UL (ref 0–0.2)
IMM GRANULOCYTES NFR BLD AUTO: 2 % (ref 0–2)
LYMPHOCYTES # BLD AUTO: 1.8 THOUSANDS/ÂΜL (ref 0.6–4.47)
LYMPHOCYTES NFR BLD AUTO: 13 % (ref 14–44)
MAGNESIUM SERPL-MCNC: 2.3 MG/DL (ref 1.6–2.6)
MCH RBC QN AUTO: 32.3 PG (ref 26.8–34.3)
MCHC RBC AUTO-ENTMCNC: 33.6 G/DL (ref 31.4–37.4)
MCV RBC AUTO: 96 FL (ref 82–98)
MONOCYTES # BLD AUTO: 1.35 THOUSAND/ÂΜL (ref 0.17–1.22)
MONOCYTES NFR BLD AUTO: 9 % (ref 4–12)
NEUTROPHILS # BLD AUTO: 10.87 THOUSANDS/ÂΜL (ref 1.85–7.62)
NEUTS SEG NFR BLD AUTO: 75 % (ref 43–75)
NRBC BLD AUTO-RTO: 0 /100 WBCS
PLATELET # BLD AUTO: 94 THOUSANDS/UL (ref 149–390)
PMV BLD AUTO: 10.9 FL (ref 8.9–12.7)
POTASSIUM SERPL-SCNC: 4.2 MMOL/L (ref 3.5–5.3)
PROT SERPL-MCNC: 5.8 G/DL (ref 6.4–8.4)
RBC # BLD AUTO: 2.48 MILLION/UL (ref 3.88–5.62)
SODIUM SERPL-SCNC: 131 MMOL/L (ref 135–147)
WBC # BLD AUTO: 14.38 THOUSAND/UL (ref 4.31–10.16)

## 2022-11-15 RX ORDER — SENNOSIDES 8.6 MG
1 TABLET ORAL
Status: DISCONTINUED | OUTPATIENT
Start: 2022-11-15 | End: 2022-11-18 | Stop reason: HOSPADM

## 2022-11-15 RX ORDER — POLYETHYLENE GLYCOL 3350 17 G/17G
17 POWDER, FOR SOLUTION ORAL DAILY PRN
Status: DISCONTINUED | OUTPATIENT
Start: 2022-11-15 | End: 2022-11-18 | Stop reason: HOSPADM

## 2022-11-15 RX ORDER — HYDROMORPHONE HCL IN WATER/PF 6 MG/30 ML
0.2 PATIENT CONTROLLED ANALGESIA SYRINGE INTRAVENOUS
Status: DISCONTINUED | OUTPATIENT
Start: 2022-11-15 | End: 2022-11-18 | Stop reason: HOSPADM

## 2022-11-15 RX ORDER — OXYCODONE HYDROCHLORIDE 5 MG/1
5 TABLET ORAL EVERY 6 HOURS
Status: DISCONTINUED | OUTPATIENT
Start: 2022-11-15 | End: 2022-11-18 | Stop reason: HOSPADM

## 2022-11-15 RX ORDER — ACETAMINOPHEN 325 MG/1
975 TABLET ORAL EVERY 8 HOURS SCHEDULED
Status: DISCONTINUED | OUTPATIENT
Start: 2022-11-15 | End: 2022-11-18 | Stop reason: HOSPADM

## 2022-11-15 RX ORDER — HYDROMORPHONE HCL/PF 1 MG/ML
0.5 SYRINGE (ML) INJECTION ONCE
Status: COMPLETED | OUTPATIENT
Start: 2022-11-15 | End: 2022-11-15

## 2022-11-15 RX ORDER — ARGATROBAN 1 MG/ML
.1-3 INJECTION INTRAVENOUS
Status: DISCONTINUED | OUTPATIENT
Start: 2022-11-15 | End: 2022-11-17

## 2022-11-15 RX ORDER — OXYCODONE HYDROCHLORIDE 5 MG/1
5 TABLET ORAL
Status: DISCONTINUED | OUTPATIENT
Start: 2022-11-15 | End: 2022-11-18 | Stop reason: HOSPADM

## 2022-11-15 RX ADMIN — HYDROMORPHONE HYDROCHLORIDE 0.5 MG: 1 INJECTION, SOLUTION INTRAMUSCULAR; INTRAVENOUS; SUBCUTANEOUS at 03:11

## 2022-11-15 RX ADMIN — OXYCODONE HYDROCHLORIDE 10 MG: 5 TABLET ORAL at 12:33

## 2022-11-15 RX ADMIN — ARGATROBAN 1.5 MCG/KG/MIN: 1 INJECTION, SOLUTION INTRAVENOUS at 17:09

## 2022-11-15 RX ADMIN — ARGATROBAN 1 MCG/KG/MIN: 1 INJECTION, SOLUTION INTRAVENOUS at 08:55

## 2022-11-15 RX ADMIN — ACETAMINOPHEN 975 MG: 325 TABLET, FILM COATED ORAL at 20:54

## 2022-11-15 RX ADMIN — INSULIN LISPRO 7 UNITS: 100 INJECTION, SOLUTION INTRAVENOUS; SUBCUTANEOUS at 12:34

## 2022-11-15 RX ADMIN — INSULIN LISPRO 7 UNITS: 100 INJECTION, SOLUTION INTRAVENOUS; SUBCUTANEOUS at 17:11

## 2022-11-15 RX ADMIN — INSULIN LISPRO 1 UNITS: 100 INJECTION, SOLUTION INTRAVENOUS; SUBCUTANEOUS at 17:10

## 2022-11-15 RX ADMIN — OXYCODONE HYDROCHLORIDE 5 MG: 5 TABLET ORAL at 20:55

## 2022-11-15 RX ADMIN — HYDROMORPHONE HYDROCHLORIDE 0.5 MG: 1 INJECTION, SOLUTION INTRAMUSCULAR; INTRAVENOUS; SUBCUTANEOUS at 04:58

## 2022-11-15 RX ADMIN — SENNOSIDES 8.6 MG: 8.6 TABLET, FILM COATED ORAL at 20:54

## 2022-11-15 RX ADMIN — ARGATROBAN 1.5 MCG/KG/MIN: 1 INJECTION, SOLUTION INTRAVENOUS at 20:16

## 2022-11-15 RX ADMIN — INSULIN LISPRO 7 UNITS: 100 INJECTION, SOLUTION INTRAVENOUS; SUBCUTANEOUS at 08:56

## 2022-11-15 RX ADMIN — INSULIN LISPRO 1 UNITS: 100 INJECTION, SOLUTION INTRAVENOUS; SUBCUTANEOUS at 08:55

## 2022-11-15 RX ADMIN — INSULIN GLARGINE 13 UNITS: 100 INJECTION, SOLUTION SUBCUTANEOUS at 20:58

## 2022-11-15 RX ADMIN — OXYCODONE HYDROCHLORIDE 10 MG: 5 TABLET ORAL at 00:17

## 2022-11-15 RX ADMIN — OXYCODONE HYDROCHLORIDE 10 MG: 5 TABLET ORAL at 04:14

## 2022-11-15 RX ADMIN — OLANZAPINE 5 MG: 5 TABLET, FILM COATED ORAL at 20:55

## 2022-11-15 RX ADMIN — OXYCODONE HYDROCHLORIDE 5 MG: 5 TABLET ORAL at 17:09

## 2022-11-15 RX ADMIN — INSULIN LISPRO 1 UNITS: 100 INJECTION, SOLUTION INTRAVENOUS; SUBCUTANEOUS at 12:33

## 2022-11-15 RX ADMIN — NICOTINE 1 PATCH: 21 PATCH, EXTENDED RELEASE TRANSDERMAL at 08:57

## 2022-11-15 RX ADMIN — ARGATROBAN 1 MCG/KG/MIN: 1 INJECTION, SOLUTION INTRAVENOUS at 05:00

## 2022-11-15 NOTE — CONSULTS
Re-Consultation - Palliative and Supportive Care   Kal Robledo 58 y o  male [id number]    Patient Active Problem List   Diagnosis   • SIRS (systemic inflammatory response syndrome) (HCC)   • Suspected Primary malignant neoplasm of left lung metastatic to other site [facility])   • Abnormal CT scan, gallbladder   • Schizophrenia (HCC)   • Elevated glucose   • Pedal edema   • Anemia of chronic disease   • Severe protein-calorie malnutrition (HCC)   • Thrombocytopenia (HCC)   • Type 2 diabetes mellitus without complication, without long-term current use of insulin ([facility] [address] )     Active issues specifically addressed today include: 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  Hilar metastasis   Retropulsion of bone into spinal canal  SIRS  Pulmonary emboli  Acute DVT   Hypercalcemia  Hyponatremia  Schizophrenia    Plan:  1  Symptom management -    - Cancer Pain:    Scheduled Tylenol 975 mg t i d     Schedule oxycodone 5 mg q 6 hours with hold parameters    Consolidate p r n  Orders to p o  Oxycodone and IV Dilaudid for severe and breakthrough pain   - bowel regimen to prevent opioid induced constipation   - continue antipsychotics as is    2  Goals - patient and friend are still interested in pursuing disease directed cares if available  Per Oncology they will assess an outpatient setting to see if he is a candidate for systemic treatment  Will work to coordinate outpatient follow-up with Medical Oncology to see palliative on same day to further discuss goals of care after determining whether not he can pursue chemotherapy/immunotherapy  -    3  Psychosocial support- NA   -    4  PSC follow-up- will follow       Code Status:  Full - Level 1   Decisional apparatus:  Patient is not competent on my exam today  If competence is lost, patient's substitute decision maker would default to friend by [place]     Advance Directive / Living Will / POLST:  No    Interval history:       Patient seen examined and currently resting comfortably without complaints  Multiple p r n  Uses of opioids in last 24 hours mostly parenteral dilaudid  MEDICATIONS / ALLERGIES:     all current active meds have been reviewed and current meds:   Current Facility-Administered Medications   Medication Dose Route Frequency   • acetaminophen (TYLENOL) tablet 975 mg  975 mg Oral Q8H [address]   • argatroban infusion (premix)  0 1-3 mcg/kg/min Intravenous Titrated   • oxyCODONE (ROXICODONE) IR tablet 5 mg  5 mg Oral Q3H PRN    Or   • HYDROmorphone HCl (DILAUDID) injection 0 2 mg  0 2 mg Intravenous Q3H PRN   • influenza vaccine, recombinant, quadrivalent (FLUBLOK) IM injection 0 5 mL  0 5 mL Intramuscular Prior to discharge   • insulin glargine (LANTUS) subcutaneous injection 13 Units 0 13 mL  13 Units Subcutaneous HS   • insulin lispro (HumaLOG) 100 units/mL subcutaneous injection 1-5 Units  1-5 Units Subcutaneous TID AC   • insulin lispro (HumaLOG) 100 units/mL subcutaneous injection 2-12 Units  2-12 Units Subcutaneous HS   • insulin lispro (HumaLOG) 100 units/mL subcutaneous injection 7 Units  7 Units Subcutaneous TID With Meals   • lidocaine (LIDODERM) 5 % patch 1 patch  1 patch Topical Daily   • lidocaine (LIDODERM) 5 % patch 1 patch  1 patch Topical Daily   • nicotine (NICODERM CQ) 21 mg/24 hr TD 24 hr patch 1 patch  1 patch Transdermal Daily   • OLANZapine (ZyPREXA) tablet 5 mg  5 mg Oral HS   • ondansetron (ZOFRAN) injection 4 mg  4 mg Intravenous Q6H PRN   • oxyCODONE (ROXICODONE) IR tablet 5 mg  5 mg Oral Q6H   • polyethylene glycol (MIRALAX) packet 17 g  17 g Oral Daily PRN   • senna (SENOKOT) tablet 8 6 mg  1 tablet Oral HS       No Known Allergies    OBJECTIVE:    Physical Exam  Physical Exam  Vitals and nursing note reviewed  Constitutional:       General: He is not in acute distress  Appearance: He is ill-appearing  HENT:      Head: Normocephalic and atraumatic        Right Ear: External ear normal       Left Ear: External ear normal       Nose: Nose normal    Eyes:      General: No scleral icterus  Right eye: No discharge  Left eye: No discharge  Cardiovascular:      Rate and Rhythm: Normal rate and regular rhythm  Pulmonary:      Effort: Pulmonary effort is normal  No respiratory distress  Breath sounds: Normal breath sounds  Abdominal:      General: Bowel sounds are normal  There is no distension  Palpations: Abdomen is soft  Skin:     General: Skin is warm and dry  Coloration: Skin is pale  Neurological:      Mental Status: Mental status is at baseline  Lab Results:   I have personally reviewed pertinent labs  , CBC:   Lab Results   Component Value Date    WBC 14 38 (H) 11/15/2022    HGB 8 0 (L) 11/15/2022    HCT 23 8 (L) 11/15/2022    MCV 96 11/15/2022    PLT 94 (L) 11/15/2022    MCH 32 3 11/15/2022    MCHC 33 6 11/15/2022    RDW 16 1 (H) 11/15/2022    MPV 10 9 11/15/2022    NRBC 0 11/15/2022   , CMP:   Lab Results   Component Value Date    SODIUM 131 (L) 11/15/2022    K 4 2 11/15/2022     11/15/2022    CO2 29 11/15/2022    BUN 23 11/15/2022    CREATININE 0 90 11/15/2022    CALCIUM 8 3 11/15/2022    AST 20 11/15/2022    ALT 17 11/15/2022    ALKPHOS 93 11/15/2022    EGFR 91 11/15/2022   , PT/PTT:  Lab Results   Component Value Date    PTT 52 (H) 11/15/2022     Imaging Studies:  Reviewed  EKG, Pathology, and Other Studies:  Reviewed    Counseling / Coordination of Care    Total floor / unit time spent today 25+ minutes  Greater than 50% of total time was spent with the patient and / or family counseling and / or coordination of care  A description of the counseling / coordination of care:  Chart review, medication review,    Portions of this document may have been created using dictation software and as such some "sound alike" terms may have been generated by the system  Do not hesitate to contact me with any questions or clarifications

## 2022-11-15 NOTE — PLAN OF CARE
Problem: OCCUPATIONAL THERAPY ADULT  Goal: Performs self-care activities at highest level of function for planned discharge setting  See evaluation for individualized goals  Description:  Outcome: Progressing  Note: Limitation: Decreased ADL status, Decreased self-care trans, Decreased high-level ADLs, Decreased UE strength, Decreased cognition  Prognosis: Fair  Assessment: Patient participated in Skilled OT session this date with interventions consisting of ADL re training with the use of correct body mechnaics, safety awareness and fall prevention techniques,  functional transfers, bed mobility*,  therapeutic activities to: increase activity tolerance and increase postural control   Patient agreeable to OT treatment session, upon arrival patient was found supine in bed  Treatment session as follows: Pt agreeable to sit EOB, fixated on being able to walk again  Pt required Max A x2 for bed mobility, then able to sit at EOB for approx 10 mins while dressing  Attempted stand at EOB with pt to change saturated linens, pt required max A x2  Patient continues to be functioning below baseline level, occupational performance remains limited secondary to factors listed above and increased risk for falls and injury  The patient's raw score on the AM-PAC Daily Activity inpatient short form is 15, standardized score is 34 69, less than 39 4  Patients at this level are likely to benefit from DC to post-acute rehabilitation services  From OT standpoint, recommendation at time of d/c would be Short Term Rehab  Patient to benefit from continued Occupational Therapy treatment while in the hospital to address deficits as defined above and maximize level of functional independence with ADLs and functional mobility    Pt left with call bell in reach, tray table in reach, needs met, bed alarm activated       OT Discharge Recommendation: Post acute rehabilitation services

## 2022-11-15 NOTE — PLAN OF CARE
Problem: Nutrition/Hydration-ADULT  Goal: Nutrient/Hydration intake appropriate for improving, restoring or maintaining nutritional needs  Description: Monitor and assess patient's nutrition/hydration status for malnutrition  Collaborate with interdisciplinary team and initiate plan and interventions as ordered  Monitor patient's weight and dietary intake as ordered or per policy  Utilize nutrition screening tool and intervene as necessary  Determine patient's food preferences and provide high-protein, high-caloric foods as appropriate       INTERVENTIONS:  - Monitor oral intake, urinary output, labs, and treatment plans  - Assess nutrition and hydration status and recommend course of action  - Evaluate amount of meals eaten  - Assist patient with eating if necessary   - Allow adequate time for meals  - Recommend/ encourage appropriate diets, oral nutritional supplements, and vitamin/mineral supplements  - Order, calculate, and assess calorie counts as needed  - Recommend, monitor, and adjust tube feedings and TPN/PPN based on assessed needs  - Assess need for intravenous fluids  - Provide specific nutrition/hydration education as appropriate  - Include patient/family/caregiver in decisions related to nutrition  Outcome: Progressing     Problem: MOBILITY - ADULT  Goal: Maintain or return to baseline ADL function  Description: INTERVENTIONS:  -  Assess patient's ability to carry out ADLs; assess patient's baseline for ADL function and identify physical deficits which impact ability to perform ADLs (bathing, care of mouth/teeth, toileting, grooming, dressing, etc )  - Assess/evaluate cause of self-care deficits   - Assess range of motion  - Assess patient's mobility; develop plan if impaired  - Assess patient's need for assistive devices and provide as appropriate  - Encourage maximum independence but intervene and supervise when necessary  - Involve family in performance of ADLs  - Assess for home care needs following discharge   - Consider OT consult to assist with ADL evaluation and planning for discharge  - Provide patient education as appropriate  Outcome: Progressing  Goal: Maintains/Returns to pre admission functional level  Description: INTERVENTIONS:  - Perform BMAT or MOVE assessment daily    - Set and communicate daily mobility goal to care team and patient/family/caregiver  - Collaborate with rehabilitation services on mobility goals if consulted  - Perform Range of Motion 2 times a day  - Reposition patient every 2 hours    - Dangle patient 2 times a day  - Stand patient 2 times a day  - Ambulate patient 2 times a day  - Out of bed to chair 2 times a day   - Out of bed for meals 2 times a day  - Out of bed for toileting  - Record patient progress and toleration of activity level   Outcome: Progressing     Problem: Prexisting or High Potential for Compromised Skin Integrity  Goal: Skin integrity is maintained or improved  Description: INTERVENTIONS:  - Identify patients at risk for skin breakdown  - Assess and monitor skin integrity  - Assess and monitor nutrition and hydration status  - Monitor labs   - Assess for incontinence   - Turn and reposition patient  - Assist with mobility/ambulation  - Relieve pressure over bony prominences  - Avoid friction and shearing  - Provide appropriate hygiene as needed including keeping skin clean and dry  - Evaluate need for skin moisturizer/barrier cream  - Collaborate with interdisciplinary team   - Patient/family teaching  - Consider wound care consult   Outcome: Progressing     Problem: PAIN - ADULT  Goal: Verbalizes/displays adequate comfort level or baseline comfort level  Description: Interventions:  - Encourage patient to monitor pain and request assistance  - Assess pain using appropriate pain scale  - Administer analgesics based on type and severity of pain and evaluate response  - Implement non-pharmacological measures as appropriate and evaluate response  - Consider cultural and social influences on pain and pain management  - Notify physician/advanced practitioner if interventions unsuccessful or patient reports new pain  Outcome: Progressing     Problem: INFECTION - ADULT  Goal: Absence or prevention of progression during hospitalization  Description: INTERVENTIONS:  - Assess and monitor for signs and symptoms of infection  - Monitor lab/diagnostic results  - Monitor all insertion sites, i e  indwelling lines, tubes, and drains  - Monitor endotracheal if appropriate and nasal secretions for changes in amount and color  - [place] appropriate cooling/warming therapies per order  - Administer medications as ordered  - Instruct and encourage patient and family to use good hand hygiene technique  - Identify and instruct in appropriate isolation precautions for identified infection/condition  Outcome: Progressing  Goal: Absence of fever/infection during neutropenic period  Description: INTERVENTIONS:  - Monitor WBC    Outcome: Progressing     Problem: SAFETY ADULT  Goal: Maintain or return to baseline ADL function  Description: INTERVENTIONS:  -  Assess patient's ability to carry out ADLs; assess patient's baseline for ADL function and identify physical deficits which impact ability to perform ADLs (bathing, care of mouth/teeth, toileting, grooming, dressing, etc )  - Assess/evaluate cause of self-care deficits   - Assess range of motion  - Assess patient's mobility; develop plan if impaired  - Assess patient's need for assistive devices and provide as appropriate  - Encourage maximum independence but intervene and supervise when necessary  - Involve family in performance of ADLs  - Assess for home care needs following discharge   - Consider OT consult to assist with ADL evaluation and planning for discharge  - Provide patient education as appropriate  Outcome: Progressing  Goal: Maintains/Returns to pre admission functional level  Description: INTERVENTIONS:  - Perform BMAT or MOVE assessment daily    - Set and communicate daily mobility goal to care team and patient/family/caregiver  - Collaborate with rehabilitation services on mobility goals if consulted  - Perform Range of Motion 2 times a day  - Reposition patient every 2 hours    - Dangle patient 2 times a day  - Stand patient 2 times a day  - Ambulate patient 2 times a day  - Out of bed to chair 2 times a day   - Out of bed for meals 2 times a day  - Out of bed for toileting  - Record patient progress and toleration of activity level   Outcome: Progressing  Goal: Patient will remain free of falls  Description: INTERVENTIONS:  - Educate patient/family on patient safety including physical limitations  - Instruct patient to call for assistance with activity   - Consult OT/PT to assist with strengthening/mobility   - Keep Call bell within reach  - Keep bed low and locked with side rails adjusted as appropriate  - Keep care items and personal belongings within reach  - Initiate and maintain comfort rounds  - Make Fall Risk Sign visible to staff  - Offer Toileting every 2 Hours, in advance of need  - Initiate/Maintain bed alarm  - Obtain necessary fall risk management equipment: socks  - Apply yellow socks and bracelet for high fall risk patients  - Consider moving patient to room near nurses station  Outcome: Progressing     Problem: DISCHARGE PLANNING  Goal: Discharge to home or other facility with appropriate resources  Description: INTERVENTIONS:  - Identify barriers to discharge w/patient and caregiver  - Arrange for needed discharge resources and transportation as appropriate  - Identify discharge learning needs (meds, wound care, etc )  - Arrange for interpretive services to assist at discharge as needed  - Refer to Case Management Department for coordinating discharge planning if the patient needs post-hospital services based on physician/advanced practitioner order or complex needs related to functional status, cognitive ability, or social support system  Outcome: Progressing     Problem: Knowledge Deficit  Goal: Patient/family/caregiver demonstrates understanding of disease process, treatment plan, medications, and discharge instructions  Description: Complete learning assessment and assess knowledge base    Interventions:  - Provide teaching at level of understanding  - Provide teaching via preferred learning methods  Outcome: Progressing     Problem: Potential for Falls  Goal: Patient will remain free of falls  Description: INTERVENTIONS:  - Educate patient/family on patient safety including physical limitations  - Instruct patient to call for assistance with activity   - Consult OT/PT to assist with strengthening/mobility   - Keep Call bell within reach  - Keep bed low and locked with side rails adjusted as appropriate  - Keep care items and personal belongings within reach  - Initiate and maintain comfort rounds  - Make Fall Risk Sign visible to staff  - Offer Toileting every 2 Hours, in advance of need  - Initiate/Maintain bed alarm  - Obtain necessary fall risk management equipment: socks  - Apply yellow socks and bracelet for high fall risk patients  - Consider moving patient to room near nurses station  Outcome: Progressing     Problem: CARDIOVASCULAR - ADULT  Goal: Maintains optimal cardiac output and hemodynamic stability  Description: INTERVENTIONS:  - Monitor I/O, vital signs and rhythm  - Monitor for S/S and trends of decreased cardiac output  - Administer and titrate ordered vasoactive medications to optimize hemodynamic stability  - Assess quality of pulses, skin color and temperature  - Assess for signs of decreased coronary artery perfusion  - Instruct patient to report change in severity of symptoms  Outcome: Progressing  Goal: Absence of cardiac dysrhythmias or at baseline rhythm  Description: INTERVENTIONS:  - Continuous cardiac monitoring, vital signs, obtain 12 lead EKG if ordered  - Administer antiarrhythmic and heart rate control medications as ordered  - Monitor electrolytes and administer replacement therapy as ordered  Outcome: Progressing     Problem: RESPIRATORY - ADULT  Goal: Achieves optimal ventilation and oxygenation  Description: INTERVENTIONS:  - Assess for changes in respiratory status  - Assess for changes in mentation and behavior  - Position to facilitate oxygenation and minimize respiratory effort  - Oxygen administered by appropriate delivery if ordered  - Initiate smoking cessation education as indicated  - Encourage broncho-pulmonary hygiene including cough, deep breathe, Incentive Spirometry  - Assess the need for suctioning and aspirate as needed  - Assess and instruct to report SOB or any respiratory difficulty  - Respiratory Therapy support as indicated  Outcome: Progressing     Problem: METABOLIC, FLUID AND ELECTROLYTES - ADULT  Goal: Electrolytes maintained within normal limits  Description: INTERVENTIONS:  - Monitor labs and assess patient for signs and symptoms of electrolyte imbalances  - Administer electrolyte replacement as ordered  - Monitor response to electrolyte replacements, including repeat lab results as appropriate  - Instruct patient on fluid and nutrition as appropriate  Outcome: Progressing  Goal: Fluid balance maintained  Description: INTERVENTIONS:  - Monitor labs   - Monitor I/O and WT  - Instruct patient on fluid and nutrition as appropriate  - Assess for signs & symptoms of volume excess or deficit  Outcome: Progressing  Goal: Glucose maintained within target range  Description: INTERVENTIONS:  - Monitor Blood Glucose as ordered  - Assess for signs and symptoms of hyperglycemia and hypoglycemia  - Administer ordered medications to maintain glucose within target range  - Assess nutritional intake and initiate nutrition service referral as needed  Outcome: Progressing     Problem: SKIN/TISSUE INTEGRITY - ADULT  Goal: Skin Integrity remains intact(Skin Breakdown Prevention)  Description: Assess:  -Perform Ashish assessment every shift  -Clean and moisturize skin every shift  -Inspect skin when repositioning, toileting, and assisting with ADLS  -Assess extremities for adequate circulation and sensation     Bed Management:  -Have minimal linens on bed & keep smooth, unwrinkled  -Change linens as needed when moist or perspiring  -Avoid sitting or lying in one position for more than 2 hours while in bed  -Keep HOB at 30 degrees     Toileting:  -Offer bedside commode  -Assess for incontinence every shift  -Use incontinent care products after each incontinent episode such as chucks    Activity:  -Mobilize patient 2 times a day  -Encourage activity and walks on unit  -Encourage or provide ROM exercises   -Turn and reposition patient every 2 Hours  -Use appropriate equipment to lift or move patient in bed  -Instruct/ Assist with weight shifting every 30 min  when out of bed in chair  -Consider limitation of chair time 2 hour intervals    Skin Care:  -Avoid use of baby powder, tape, friction and shearing, hot water or constrictive clothing  -Relieve pressure over bony prominences using weight shifting  -Do not massage red bony areas    Next Steps:  -Teach patient strategies to minimize risks such as weight shifting   -Consider consults to  interdisciplinary teams such as   Outcome: Progressing     Problem: HEMATOLOGIC - ADULT  Goal: Maintains hematologic stability  Description: INTERVENTIONS  - Assess for signs and symptoms of bleeding or hemorrhage  - Monitor labs  - Administer supportive blood products/factors as ordered and appropriate  Outcome: Progressing     Problem: MUSCULOSKELETAL - ADULT  Goal: Maintain or return mobility to safest level of function  Description: INTERVENTIONS:  - Assess patient's ability to carry out ADLs; assess patient's baseline for ADL function and identify physical deficits which impact ability to perform ADLs (bathing, care of mouth/teeth, toileting, grooming, dressing, etc )  - Assess/evaluate cause of self-care deficits   - Assess range of motion  - Assess patient's mobility  - Assess patient's need for assistive devices and provide as appropriate  - Encourage maximum independence but intervene and supervise when necessary  - Involve family in performance of ADLs  - Assess for home care needs following discharge   - Consider OT consult to assist with ADL evaluation and planning for discharge  - Provide patient education as appropriate  Outcome: Progressing

## 2022-11-15 NOTE — CASE MANAGEMENT
Case Management Progress Note    Patient name Rdaha Zamorano  Location [facility] [phone] MRN [id number]  : 1960 Date 11/15/2022       LOS (days): 12  Geometric Mean LOS (GMLOS) (days): 2 10  Days to GMLOS:-10 4        OBJECTIVE:        Current admission status: Inpatient  Preferred Pharmacy:   [address] [place] [address], [address] [place] [id number]  Phone: [phone] Fax: [phone]    Primary Care Provider: No primary care provider on file  Primary Insurance: MEDICARE  Secondary Insurance: [facility]    PROGRESS NOTE: Pt discussed in care rounds this AM  Planning for medical clearance to STR in the next 48 hours  STR referrals sent

## 2022-11-15 NOTE — ASSESSMENT & PLAN NOTE
Lab Results   Component Value Date    HGBA1C 9 1 (H) 11/02/2022       Recent Labs     11/14/22  1227 11/14/22  1613 11/14/22  2106 11/15/22  0125   POCGLU 235* 284* 445* 191*       Blood Sugar Average: Last 72 hrs:  (P) 241 [id number]     See mgx of elevated glucose

## 2022-11-15 NOTE — PHYSICAL THERAPY NOTE
PHYSICAL THERAPY TX     11/15/22 1035   PT Last Visit   PT Visit Date 11/15/22   Note Type   Note Type Treatment   Pain Assessment   Pain Assessment [place] FACES   [place] FACES Pain Rating 2   Pain Location/Orientation Location: Rib Cage   Hospital Pain Intervention(s) Repositioned   Restrictions/Precautions   Weight Bearing Precautions Per Order No   Other Precautions Pain; Fall Risk;Bed Alarm; Chair Alarm;Cognitive   General   Chart Reviewed Yes   Additional Pertinent History Metastatic adenocarcinoma of the lung with widespread osseous disease   Response to Previous Treatment Patient with no complaints from previous session  Family/Caregiver Present No   Cognition   Overall Cognitive Status Impaired   Arousal/Participation Alert   Attention Attends with cues to redirect   Orientation Level Disoriented to place; Disoriented to time;Disoriented to situation   Memory Decreased recall of recent events;Decreased short term memory   Following Commands Follows one step commands with increased time or repetition   Comments Easily distracted  Subjective   Subjective "It felt good to sit up "   Bed Mobility   Supine to Sit 3  Moderate assistance   Additional items Assist x 2;HOB elevated; Bedrails; Increased time required;Verbal cues  (log roll technique)   Sit to Supine 2  Maximal assistance   Additional items Assist x 2;HOB elevated; Bedrails; Increased time required;Verbal cues;LE management   Additional Comments Once supine, dependent to be pulled up towards head of bed for positioning  (Sat edge of bed for approx 7-8 minutes with supervision)   Transfers   Sit to Stand 2  Maximal assistance   Additional items Assist x 2;Armrests   Stand to Sit 2  Maximal assistance   Additional items Assist x 2;Armrests; Increased time required;Verbal cues   Ambulation/Elevation   Gait pattern Shuffling; Forward Flexion; Antalgic  (B/L knee buckling)   Gait Assistance 2  Maximal assist   Additional items Assist x 2;Verbal cues; Tactile cues   Assistive Device Rolling walker   Distance 2ft  (3 side steps towards head of bed)   Balance   Static Sitting Fair +   Dynamic Sitting Fair   Static Standing Poor -   Dynamic Standing Poor -   Endurance Deficit   Endurance Deficit Yes   Endurance Deficit Description Easily fatigued and limited by pain   Activity Tolerance   Activity Tolerance Patient limited by fatigue;Patient limited by pain   Medical Staff Made Aware Co-treat with Mary ROSEN due to medical complexity   Nurse Made Aware Nataliya AARON   Assessment   Prognosis Poor   Problem List Decreased strength;Decreased range of motion;Decreased endurance; Impaired balance;Decreased mobility; Decreased cognition; Impaired judgement;Decreased safety awareness;Pain   Assessment Patient was received supine in bed  Needed encouragement and education to participate  Patient confused and needed constant redirection to tasks  Patient required assistance of 2 for all mobility  B/L knee buckling in stance  Not appropriate for further ambulation  Patient is a high fall risk and is extremely deconditioned  He is limited by pain  Goal updated  Continue to recommend rehab  RN made aware that there was no call bell in room  The patient's AM-PAC Basic Mobility Inpatient Short Form Raw Score is 9  A Raw score of less than or equal to 17 suggests the patient may benefit from discharge to post-acute rehabilitation services  Please also refer to the recommendation of the Physical Therapist for safe discharge planning  Barriers to Discharge Inaccessible home environment;Decreased caregiver support   Goals   Patient Goals None stated   STG Expiration Date 11/29/22   Short Term Goal #1 1  Perform rolling L and R with min A x 1 2  Perform supine<>sit with HOB elevated with bedrails min A x 1 3  Perform sit<>stand transfers with mod A x 1 4   Perform stand pivot transfers with mod A x 1 Plan   Treatment/Interventions Functional transfer training;LE strengthening/ROM; Therapeutic exercise; Endurance training;Cognitive reorientation;Equipment eval/education; Bed mobility;Gait training;Spoke to nursing;OT   Progress Slow progress, decreased activity tolerance   PT Frequency 3-5x/wk   Recommendation   PT Discharge Recommendation Post acute rehabilitation services   AM-PAC Basic Mobility Inpatient   Turning in Bed Without Bedrails 2   Lying on Back to Sitting on Edge of Flat Bed 2   Moving Bed to Chair 1   Standing Up From Chair 2   Walk in Room 1   Climb 3-5 Stairs 1   Basic Mobility Inpatient Raw Score 9   Turning Head Towards Sound 3   Follow Simple Instructions 3   Low Function Basic Mobility Raw Score 15   Low Function Basic Mobility Standardized Score 23 9   Highest Level Of Mobility   JH-HLM Goal 3: Sit at edge of bed   JH-HLM Achieved 3: Sit at edge of bed   Education   Education Provided Mobility training;Assistive device   Patient Reinforcement needed   End of Consult   Patient Position at End of Consult Supine; All needs within reach;Bed/Chair alarm activated   Cadence Sibley            Patient Name: Dorina Snellen  TNEPR'P Date: 11/15/2022

## 2022-11-15 NOTE — ASSESSMENT & PLAN NOTE
· Serum glucose on admission is 233   · No prior reported history of DM, however patient poor historian  · hemoglobin A1c 9 1  · started on insulin regimen, increased w improvement  · ISS  · DM diet  · Endo consulted  · Lantus 13 U qhs  · Humalog 7U tid  · DM education    F/u outpt

## 2022-11-15 NOTE — PROGRESS NOTES
[place]  Progress Note Kristine Colon 1960, 58 y o  male MRN: [id number]  Unit/Bed#: -01 Encounter: [id number]  Primary Care Provider: No primary care provider on file  Date and time admitted to hospital: 11/2/2022  9:13 PM    * Suspected Primary malignant neoplasm of left lung metastatic to other site [facility])  Assessment & Plan  · Presents with generalized weakness, fatigue, and weight loss  · CT a/P: " 2 5 x 2 2 x 2 2 cm mass in the left lower lobe concerning for lung cancer  Lytic lesions within the left 6th rib and pelvis bones compatible with osseous metastasis "  · Longstanding smoking history  · Will ask pulm to evaluate  · Heme-Onc following       · patient will need lifelong anticoagulation d/t stage IV malignancy-> will need to transition to Eliquis or fondaparinux  · IR tissue biopsy performed - 11/8- positive for metastatic lung adenocarcinoma  · Palliative care following  · Guardian requesting treatment with chemo and possible immunotherapy    F/u with h/o and palliative outpt    Type 2 diabetes mellitus without complication, without long-term current use of insulin [facility])  Assessment & Plan  Lab Results   Component Value Date    HGBA1C 9 1 (H) 11/02/2022       Recent Labs     11/14/22  1227 11/14/22  1613 11/14/22  2106 11/15/22  0125   POCGLU 235* 284* 445* 191*       Blood Sugar Average: Last 72 hrs:  (P) 241 [id number]     See mgx of elevated glucose    Thrombocytopenia (HCC)  Assessment & Plan  D/w hematology on call  continue argatroban Gtt  Monitor platelets  improving    Severe protein-calorie malnutrition (HCC)  Assessment & Plan  Malnutrition Findings:   Adult Malnutrition type: Chronic illness  Adult Degree of Malnutrition: Other severe protein calorie malnutrition  Malnutrition Characteristics: Fat loss, Muscle loss                  360 Statement: Pt presents with severe protein calorie malnutrition as evidenced by orbital hollowing, temporal scooping and prominent clavicle bone protrusion  BMI Findings: Body mass index is 23 63 kg/m²  Anemia of chronic disease  Assessment & Plan  Severe anemia, progressive   Likely related to malignancy  No sx of bleeding  Transfused 1 unit with improvement       Pedal edema  Assessment & Plan  · B/l pedal edema (R>L) for a couple of days   · Due to probable newly diagnosed metastatic cancer on CT scan, which demonstrated DVTs     Elevated glucose  Assessment & Plan  · Serum glucose on admission is 233   · No prior reported history of DM, however patient poor historian  · hemoglobin A1c 9 1  · started on insulin regimen, increased w improvement  · ISS  · DM diet  · Endo consulted  · Lantus 13 U qhs  · Humalog 7U tid  · DM education    F/u outpt    Schizophrenia ([facility] [address] )  Assessment & Plan  · Self-reported history of schizophrenia   · Reportedly treated with clonazepam 100 mg HS, however patient reports non adherence with this   · Presented to the ED with acute psychosis requiring Haldol 5 mg IM x1  · Neuro- psychiatry was consulted because of question about patient's capacity  Patient did not seem to have insight into his condition  He does not have capacity to make medical decisions at this time  · Neuro psych consult repeated as mentation has improved however continues to lack insight    Abnormal CT scan, gallbladder  Assessment & Plan  · Distended gallbladder on CT scan   · Obtained right upper quadrant ultrasound patient also with elevated T bili and alk-phos  · Completed zosyn  No acute surgical needs by general surgery   No suspicion for cholecystitis per surg    F/u with surgery outpt    SIRS (systemic inflammatory response syndrome) (HCC)  Assessment & Plan  · Sirs criteria met on admission:  Tachycardia and leukocytosis at 17 36 K without bandemia   · No severe sepsis criteria met   · Suspect reactive from suspected metastatic lung cancer as seen on CT scan   · UA without infection · Procalcitonin elevated at 11 67-> 3    · ? Elevation secondary to infection or possible small cell lung cancer  · CT scan does show distended gallbladder and with elevated T bili and alk-phos, received IV Zosyn to cover for possible infection  Surgery suggests no acute surgical intervention  · Blood cultures x2, neg  · ID s/o  · Fever curve improving  Observing off abx  Fevers felt 2/2 malignancy, dvt etc       Hypokalemia-resolved as of 2022  Assessment & Plan  Replete aggressively  resolved    Hyponatremia-resolved as of 11/15/2022  Assessment & Plan  · resolved    Hypercalcemia-resolved as of 2022  Assessment & Plan  · PTH low, hypercalcemia of malignancy is etiology   · apprec nephrology assistance  · S/p IVF, lasix, zometa  · resolved      VTE Pharmacologic Prophylaxis: VTE Score: 4 Moderate Risk (Score 3-4) - Pharmacological DVT Prophylaxis Ordered: argatroban  Patient Centered Rounds: I performed bedside rounds with nursing staff today  Discussions with Specialists or Other Care Team Provider: H/O, endo, palliative, neuropsych    Education and Discussions with Family / Patient: Updated  (friend) via phone  Time Spent for Care: 30 minutes  More than 50% of total time spent on counseling and coordination of care as described above  Current Length of Stay: 12 day(s)  Current Patient Status: Inpatient   Certification Statement: 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? Discharge Plan: Anticipate discharge in 48-72 hrs to rehab facility  Code Status: Level 1 - Full Code    Subjective:   Lady Madison was seen and examined at bedside  No acute events overnight  Patient remained to have very poor insight into medical conditions and cancer and would like to leave however is oriented    Has no acute symptoms at this time    Objective:     Vitals:   Temp (24hrs), Av °F (36 7 °C), Min:97 9 °F (36 6 °C), Max:98 °F (36 7 °C)    Temp: [97 9 °F (36 6 °C)-98 °F (36 7 °C)] 98 °F (36 7 °C)  HR:  [109-113] 113  Resp:  [18-19] 18  BP: (100-145)/(65-93) 145/93  SpO2:  [95 %-97 %] 97 %  Body mass index is 23 63 kg/m²  Input and Output Summary (last 24 hours): Intake/Output Summary (Last 24 hours) at 11/15/2022 0658  Last data filed at 11/15/2022 0000  Gross per 24 hour   Intake 720 ml   Output 675 ml   Net 45 ml       Physical Exam:   Physical Exam   Vitals and nursing note reviewed  Constitutional:       Appearance: He is not ill-appearing  Comments: Thin frail   HENT:      Head: Normocephalic and atraumatic  Cardiovascular:      Rate and Rhythm: Regular rhythm  Tachycardia present  Pulses: Normal pulses  Heart sounds: Normal heart sounds  Pulmonary:      Effort: Pulmonary effort is normal       Breath sounds: Normal breath sounds  Abdominal:      General: Abdomen is flat  Bowel sounds are normal       Palpations: Abdomen is soft  Musculoskeletal:      Right lower leg: No edema  Left lower leg: No edema  Skin:     General: Skin is warm  Neurological:      Mental Status: He is alert  Mental status is at baseline      Additional Data:     Labs:  Results from last 7 days   Lab Units 11/15/22  0140   WBC Thousand/uL 14 38*   HEMOGLOBIN g/dL 8 0*   HEMATOCRIT % 23 8*   PLATELETS Thousands/uL 94*   NEUTROS PCT % 75   LYMPHS PCT % 13*   MONOS PCT % 9   EOS PCT % 1     Results from last 7 days   Lab Units 11/15/22  0140   SODIUM mmol/L 131*   POTASSIUM mmol/L 4 2   CHLORIDE mmol/L 100   CO2 mmol/L 29   BUN mg/dL 23   CREATININE mg/dL 0 90   ANION GAP mmol/L 2*   CALCIUM mg/dL 8 3   ALBUMIN g/dL 1 9*   TOTAL BILIRUBIN mg/dL 0 50   ALK PHOS U/L 93   ALT U/L 17   AST U/L 20   GLUCOSE RANDOM mg/dL 166*     Results from last 7 days   Lab Units 11/13/22  0938   INR  1 14     Results from last 7 days   Lab Units 11/15/22  0125 11/14/22  2106 11/14/22  1613 11/14/22  1227 11/14/22  0728 11/13/22  2051 11/13/22  1641 11/13/22  1226 11/13/22  0732 11/12/22  2015 11/12/22  1601 11/12/22  1251   POC GLUCOSE mg/dl 191* 445* 284* 235* 163* 176* 236* 166* 163* 174* 246* 324*               Lines/Drains:  Invasive Devices  Report    Peripheral Intravenous Line  Duration           Peripheral IV 11/13/22 Left;Upper;Ventral (anterior) Arm 1 day                      Imaging: No pertinent imaging reviewed  Recent Cultures (last 7 days):   Results from last 7 days   Lab Units 11/09/22  0352   BLOOD CULTURE  No Growth After 5 Days  No Growth After 5 Days         Last 24 Hours Medication List:   Current Facility-Administered Medications   Medication Dose Route Frequency Provider Last Rate   • acetaminophen  650 mg Oral Q8H PRN Jocelyn Hannon PA-C     • argatroban  0 1-3 mcg/kg/min Intravenous Titrated Belen Navarro MD 1 mcg/kg/min (11/15/22 0500)   • HYDROmorphone  0 5 mg Intravenous Q3H PRN Belen Navarro MD     • influenza vaccine  0 5 mL Intramuscular Prior to discharge Jocelyn Hannon PA-C     • insulin glargine  13 Units Subcutaneous HS Heath Metz MD     • insulin lispro  1-5 Units Subcutaneous TID AC Jocelyn Hannon PA-C     • insulin lispro  2-12 Units Subcutaneous HS Uriel Funk PA-C     • insulin lispro  7 Units Subcutaneous TID With Meals Nadiya Rosario MD     • lidocaine  1 patch Topical Daily Marvel Segura MD     • lidocaine  1 patch Topical Daily Marvel Segura MD     • nicotine  1 patch Transdermal Daily Jocelyn Hannon PA-C     • OLANZapine  5 mg Oral HS Marvel Segura MD     • ondansetron  4 mg Intravenous Q6H PRN Jocelyn Hannon PA-C     • oxyCODONE  10 mg Oral Q4H PRN Belen Navarro MD     • oxyCODONE  5 mg Oral Q6H PRN Belen Navarro MD     • senna  1 tablet Oral HS PRN Jocelyn Hannon PA-C          Today, Patient Was Seen By: Heath Metz    **Please Note: This note may have been constructed using a voice recognition system  **

## 2022-11-15 NOTE — ASSESSMENT & PLAN NOTE
· Sirs criteria met on admission:  Tachycardia and leukocytosis at 17 36 K without bandemia   · No severe sepsis criteria met   · Suspect reactive from suspected metastatic lung cancer as seen on CT scan   · UA without infection   · Procalcitonin elevated at 11 67-> 3    · ? Elevation secondary to infection or possible small cell lung cancer  · CT scan does show distended gallbladder and with elevated T bili and alk-phos, received IV Zosyn to cover for possible infection  Surgery suggests no acute surgical intervention  · Blood cultures x2, neg  · ID s/o  · Fever curve improving  Observing off abx   Fevers felt 2/2 malignancy, dvt etc

## 2022-11-15 NOTE — ASSESSMENT & PLAN NOTE
· Self-reported history of schizophrenia   · Reportedly treated with clonazepam 100 mg HS, however patient reports non adherence with this   · Presented to the ED with acute psychosis requiring Haldol 5 mg IM x1  · Neuro- psychiatry was consulted because of question about patient's capacity  Patient did not seem to have insight into his condition  He does not have capacity to make medical decisions at this time    · Neuro psych consult repeated as mentation has improved however continues to lack insight

## 2022-11-15 NOTE — OCCUPATIONAL THERAPY NOTE
Occupational Therapy Treatment Note    Patient Name: Kim Garcia  NPIPE'D Date: 11/15/2022     11/15/22 1034   OT Last Visit   OT Visit Date 11/15/22   Note Type   Note Type Treatment   Pain Assessment   Pain Assessment [place] FACES   [place]aker FACES Pain Rating 2   Pain Location/Orientation Location: Rib Cage   Restrictions/Precautions   Weight Bearing Precautions Per Order No   Other Precautions Fall Risk;Bed Alarm;Cognitive  (Pt schizophrenia & not competent)   ADL   UB Dressing Assistance 4  Minimal Assistance   LB Dressing Assistance 3  Moderate Assistance   Toileting Comments no needs at this time  Pt bed saturated, linens changed   Bed Mobility   Supine to Sit 3  Moderate assistance   Additional items Assist x 2   Sit to Supine 2  Maximal assistance   Additional items Assist x 2   Transfers   Sit to Stand 2  Maximal assistance   Additional items Assist x 2   Stand to Sit 2  Maximal assistance   Additional items Assist x 2   Cognition   Overall Cognitive Status Impaired   Arousal/Participation Alert   Attention Attends with cues to redirect   Orientation Level Disoriented to situation   Memory Decreased recall of precautions;Decreased recall of recent events;Decreased short term memory;Decreased recall of biographical information;Decreased long term memory   Following Commands Follows one step commands inconsistently   Activity Tolerance   Activity Tolerance Patient limited by pain   Medical Staff Made Aware PT Briana, RN Nataliya   Assessment   Assessment Patient participated in Skilled OT session this date with interventions consisting of ADL re training with the use of correct body mechnaics, safety awareness and fall prevention techniques,  functional transfers, bed mobility*,  therapeutic activities to: increase activity tolerance and increase postural control   Patient agreeable to OT treatment session, upon arrival patient was found supine in bed    Treatment session as follows: Pt agreeable to sit EOB, fixated on being able to walk again  Pt required Max A x2 for bed mobility, then able to sit at EOB for approx 10 mins while dressing  Attempted stand at EOB with pt to change saturated linens, pt required max A x2  Patient continues to be functioning below baseline level, occupational performance remains limited secondary to factors listed above and increased risk for falls and injury  The patient's raw score on the AM-PAC Daily Activity inpatient short form is 15, standardized score is 34 69, less than 39 4  Patients at this level are likely to benefit from DC to post-acute rehabilitation services  From OT standpoint, recommendation at time of d/c would be Short Term Rehab  Patient to benefit from continued Occupational Therapy treatment while in the hospital to address deficits as defined above and maximize level of functional independence with ADLs and functional mobility    Pt left with call bell in reach, tray table in reach, needs met, bed alarm activated     Plan   OT Treatment Day 1   Recommendation   OT Discharge Recommendation Post acute rehabilitation services   AM-PAC Daily Activity Inpatient   Lower Body Dressing 2   Bathing 2   Toileting 2   Upper Body Dressing 3   Grooming 2   Eating 4   Daily Activity Raw Score 15   Daily Activity Standardized Score (Calc for Raw Score >=11) 34 69   AM-PAC Applied Cognition Inpatient   Following a Speech/Presentation 2   Understanding Ordinary Conversation 3   Taking Medications 2   Remembering Where Things Are Placed or Put Away 3   Remembering List of 4-5 Errands 3   Taking Care of Complicated Tasks 2   Applied Cognition Raw Score 15   Applied Cognition Standardized Score 33 54     [place], MS, OTR/L

## 2022-11-15 NOTE — PLAN OF CARE
Problem: PHYSICAL THERAPY ADULT  Goal: Performs mobility at highest level of function for planned discharge setting  See evaluation for individualized goals  Description: Treatment/Interventions: Functional transfer training, LE strengthening/ROM, Therapeutic exercise, Endurance training, Cognitive reorientation, Equipment eval/education, Gait training, Bed mobility, Spoke to nursing, Spoke to case management  Equipment Recommended: Wheelchair, Snippets       See flowsheet documentation for full assessment, interventions and recommendations  Outcome: Not Progressing  Note: Prognosis: Poor  Problem List: Decreased strength, Decreased range of motion, Decreased endurance, Impaired balance, Decreased mobility, Decreased cognition, Impaired judgement, Decreased safety awareness, Pain  Assessment: Patient was received supine in bed  Needed encouragement and education to participate  Patient confused and needed constant redirection to tasks  Patient required assistance of 2 for all mobility  B/L knee buckling in stance  Not appropriate for further ambulation  Patient is a high fall risk and is extremely deconditioned  He is limited by pain  Goal updated  Continue to recommend rehab  RN made aware that there was no call bell in room  The patient's AM-PAC Basic Mobility Inpatient Short Form Raw Score is 9  A Raw score of less than or equal to 17 suggests the patient may benefit from discharge to post-acute rehabilitation services  Please also refer to the recommendation of the Physical Therapist for safe discharge planning  Barriers to Discharge: Inaccessible home environment, Decreased caregiver support  Barriers to Discharge Comments: Lives alone  PT Discharge Recommendation: Post acute rehabilitation services    See flowsheet documentation for full assessment

## 2022-11-16 LAB
ALBUMIN SERPL BCP-MCNC: 1.9 G/DL (ref 3.5–5)
ALP SERPL-CCNC: 89 U/L (ref 46–116)
ALT SERPL W P-5'-P-CCNC: 15 U/L (ref 12–78)
ANION GAP SERPL CALCULATED.3IONS-SCNC: 5 MMOL/L (ref 4–13)
APTT PPP: 75 SECONDS (ref 23–37)
APTT PPP: 76 SECONDS (ref 23–37)
APTT PPP: 88 SECONDS (ref 23–37)
AST SERPL W P-5'-P-CCNC: 18 U/L (ref 5–45)
BILIRUB SERPL-MCNC: 0.5 MG/DL (ref 0.2–1)
BUN SERPL-MCNC: 26 MG/DL (ref 5–25)
CALCIUM ALBUM COR SERPL-MCNC: 10 MG/DL (ref 8.3–10.1)
CALCIUM SERPL-MCNC: 8.3 MG/DL (ref 8.3–10.1)
CHLORIDE SERPL-SCNC: 101 MMOL/L (ref 96–108)
CO2 SERPL-SCNC: 28 MMOL/L (ref 21–32)
CREAT SERPL-MCNC: 0.85 MG/DL (ref 0.6–1.3)
ERYTHROCYTE [DISTWIDTH] IN BLOOD BY AUTOMATED COUNT: 16.5 % (ref 11.6–15.1)
GFR SERPL CREATININE-BSD FRML MDRD: 93 ML/MIN/1.73SQ M
GLUCOSE SERPL-MCNC: 102 MG/DL (ref 65–140)
GLUCOSE SERPL-MCNC: 137 MG/DL (ref 65–140)
GLUCOSE SERPL-MCNC: 258 MG/DL (ref 65–140)
GLUCOSE SERPL-MCNC: 335 MG/DL (ref 65–140)
GLUCOSE SERPL-MCNC: 99 MG/DL (ref 65–140)
HCT VFR BLD AUTO: 23.1 % (ref 36.5–49.3)
HGB BLD-MCNC: 7.5 G/DL (ref 12–17)
INR PPP: 2.34 (ref 0.84–1.19)
MCH RBC QN AUTO: 31.6 PG (ref 26.8–34.3)
MCHC RBC AUTO-ENTMCNC: 32.5 G/DL (ref 31.4–37.4)
MCV RBC AUTO: 98 FL (ref 82–98)
PF4 HEPARIN CMPLX AB SER-ACNC: 3.05 OD (ref 0–0.4)
PLATELET # BLD AUTO: 134 THOUSANDS/UL (ref 149–390)
PMV BLD AUTO: 10.9 FL (ref 8.9–12.7)
POTASSIUM SERPL-SCNC: 4.1 MMOL/L (ref 3.5–5.3)
PROT SERPL-MCNC: 5.9 G/DL (ref 6.4–8.4)
PROTHROMBIN TIME: 26.8 SECONDS (ref 11.6–14.5)
RBC # BLD AUTO: 2.37 MILLION/UL (ref 3.88–5.62)
SODIUM SERPL-SCNC: 134 MMOL/L (ref 135–147)
WBC # BLD AUTO: 13.03 THOUSAND/UL (ref 4.31–10.16)

## 2022-11-16 RX ADMIN — INSULIN GLARGINE 13 UNITS: 100 INJECTION, SOLUTION SUBCUTANEOUS at 22:15

## 2022-11-16 RX ADMIN — INSULIN LISPRO 7 UNITS: 100 INJECTION, SOLUTION INTRAVENOUS; SUBCUTANEOUS at 12:15

## 2022-11-16 RX ADMIN — NICOTINE 1 PATCH: 21 PATCH, EXTENDED RELEASE TRANSDERMAL at 08:34

## 2022-11-16 RX ADMIN — INSULIN LISPRO 7 UNITS: 100 INJECTION, SOLUTION INTRAVENOUS; SUBCUTANEOUS at 17:34

## 2022-11-16 RX ADMIN — OXYCODONE HYDROCHLORIDE 5 MG: 5 TABLET ORAL at 22:14

## 2022-11-16 RX ADMIN — OXYCODONE HYDROCHLORIDE 5 MG: 5 TABLET ORAL at 14:47

## 2022-11-16 RX ADMIN — ACETAMINOPHEN 975 MG: 325 TABLET, FILM COATED ORAL at 22:13

## 2022-11-16 RX ADMIN — OXYCODONE HYDROCHLORIDE 5 MG: 5 TABLET ORAL at 03:22

## 2022-11-16 RX ADMIN — ARGATROBAN 1 MCG/KG/MIN: 1 INJECTION, SOLUTION INTRAVENOUS at 03:21

## 2022-11-16 RX ADMIN — SENNOSIDES 8.6 MG: 8.6 TABLET, FILM COATED ORAL at 22:13

## 2022-11-16 RX ADMIN — INSULIN LISPRO 7 UNITS: 100 INJECTION, SOLUTION INTRAVENOUS; SUBCUTANEOUS at 08:35

## 2022-11-16 RX ADMIN — ACETAMINOPHEN 975 MG: 325 TABLET, FILM COATED ORAL at 04:58

## 2022-11-16 RX ADMIN — INSULIN LISPRO 6 UNITS: 100 INJECTION, SOLUTION INTRAVENOUS; SUBCUTANEOUS at 22:14

## 2022-11-16 RX ADMIN — ARGATROBAN 1 MCG/KG/MIN: 1 INJECTION, SOLUTION INTRAVENOUS at 14:48

## 2022-11-16 RX ADMIN — OLANZAPINE 5 MG: 5 TABLET, FILM COATED ORAL at 22:14

## 2022-11-16 RX ADMIN — OXYCODONE HYDROCHLORIDE 5 MG: 5 TABLET ORAL at 08:35

## 2022-11-16 RX ADMIN — HYDROMORPHONE HYDROCHLORIDE 0.2 MG: 0.2 INJECTION, SOLUTION INTRAMUSCULAR; INTRAVENOUS; SUBCUTANEOUS at 11:27

## 2022-11-16 RX ADMIN — INSULIN LISPRO 4 UNITS: 100 INJECTION, SOLUTION INTRAVENOUS; SUBCUTANEOUS at 17:33

## 2022-11-16 RX ADMIN — ACETAMINOPHEN 975 MG: 325 TABLET, FILM COATED ORAL at 14:47

## 2022-11-16 NOTE — PLAN OF CARE
Problem: Nutrition/Hydration-ADULT  Goal: Nutrient/Hydration intake appropriate for improving, restoring or maintaining nutritional needs  Description: Monitor and assess patient's nutrition/hydration status for malnutrition  Collaborate with interdisciplinary team and initiate plan and interventions as ordered  Monitor patient's weight and dietary intake as ordered or per policy  Utilize nutrition screening tool and intervene as necessary  Determine patient's food preferences and provide high-protein, high-caloric foods as appropriate       INTERVENTIONS:  - Monitor oral intake, urinary output, labs, and treatment plans  - Assess nutrition and hydration status and recommend course of action  - Evaluate amount of meals eaten  - Assist patient with eating if necessary   - Allow adequate time for meals  - Recommend/ encourage appropriate diets, oral nutritional supplements, and vitamin/mineral supplements  - Order, calculate, and assess calorie counts as needed  - Recommend, monitor, and adjust tube feedings and TPN/PPN based on assessed needs  - Assess need for intravenous fluids  - Provide specific nutrition/hydration education as appropriate  - Include patient/family/caregiver in decisions related to nutrition  Outcome: Progressing     Problem: MOBILITY - ADULT  Goal: Maintain or return to baseline ADL function  Description: INTERVENTIONS:  -  Assess patient's ability to carry out ADLs; assess patient's baseline for ADL function and identify physical deficits which impact ability to perform ADLs (bathing, care of mouth/teeth, toileting, grooming, dressing, etc )  - Assess/evaluate cause of self-care deficits   - Assess range of motion  - Assess patient's mobility; develop plan if impaired  - Assess patient's need for assistive devices and provide as appropriate  - Encourage maximum independence but intervene and supervise when necessary  - Involve family in performance of ADLs  - Assess for home care needs following discharge   - Consider OT consult to assist with ADL evaluation and planning for discharge  - Provide patient education as appropriate  Outcome: Progressing  Goal: Maintains/Returns to pre admission functional level  Description: INTERVENTIONS:  - Perform BMAT or MOVE assessment daily    - Set and communicate daily mobility goal to care team and patient/family/caregiver  - Collaborate with rehabilitation services on mobility goals if consulted  - Perform Range of Motion 2 times a day  - Reposition patient every 2 hours    - Dangle patient 2 times a day  - Stand patient 2 times a day  - Ambulate patient 2 times a day  - Out of bed to chair 2 times a day   - Out of bed for meals 2 times a day  - Out of bed for toileting  - Record patient progress and toleration of activity level   Outcome: Progressing     Problem: Prexisting or High Potential for Compromised Skin Integrity  Goal: Skin integrity is maintained or improved  Description: INTERVENTIONS:  - Identify patients at risk for skin breakdown  - Assess and monitor skin integrity  - Assess and monitor nutrition and hydration status  - Monitor labs   - Assess for incontinence   - Turn and reposition patient  - Assist with mobility/ambulation  - Relieve pressure over bony prominences  - Avoid friction and shearing  - Provide appropriate hygiene as needed including keeping skin clean and dry  - Evaluate need for skin moisturizer/barrier cream  - Collaborate with interdisciplinary team   - Patient/family teaching  - Consider wound care consult   Outcome: Progressing     Problem: PAIN - ADULT  Goal: Verbalizes/displays adequate comfort level or baseline comfort level  Description: Interventions:  - Encourage patient to monitor pain and request assistance  - Assess pain using appropriate pain scale  - Administer analgesics based on type and severity of pain and evaluate response  - Implement non-pharmacological measures as appropriate and evaluate response  - Consider cultural and social influences on pain and pain management  - Notify physician/advanced practitioner if interventions unsuccessful or patient reports new pain  Outcome: Progressing     Problem: INFECTION - ADULT  Goal: Absence or prevention of progression during hospitalization  Description: INTERVENTIONS:  - Assess and monitor for signs and symptoms of infection  - Monitor lab/diagnostic results  - Monitor all insertion sites, i e  indwelling lines, tubes, and drains  - Monitor endotracheal if appropriate and nasal secretions for changes in amount and color  - [place] appropriate cooling/warming therapies per order  - Administer medications as ordered  - Instruct and encourage patient and family to use good hand hygiene technique  - Identify and instruct in appropriate isolation precautions for identified infection/condition  Outcome: Progressing  Goal: Absence of fever/infection during neutropenic period  Description: INTERVENTIONS:  - Monitor WBC    Outcome: Progressing     Problem: SAFETY ADULT  Goal: Maintain or return to baseline ADL function  Description: INTERVENTIONS:  -  Assess patient's ability to carry out ADLs; assess patient's baseline for ADL function and identify physical deficits which impact ability to perform ADLs (bathing, care of mouth/teeth, toileting, grooming, dressing, etc )  - Assess/evaluate cause of self-care deficits   - Assess range of motion  - Assess patient's mobility; develop plan if impaired  - Assess patient's need for assistive devices and provide as appropriate  - Encourage maximum independence but intervene and supervise when necessary  - Involve family in performance of ADLs  - Assess for home care needs following discharge   - Consider OT consult to assist with ADL evaluation and planning for discharge  - Provide patient education as appropriate  Outcome: Progressing  Goal: Maintains/Returns to pre admission functional level  Description: INTERVENTIONS:  - Perform BMAT or MOVE assessment daily    - Set and communicate daily mobility goal to care team and patient/family/caregiver  - Collaborate with rehabilitation services on mobility goals if consulted  - Perform Range of Motion 2 times a day  - Reposition patient every 2 hours    - Dangle patient 2 times a day  - Stand patient 2 times a day  - Ambulate patient 2 times a day  - Out of bed to chair 2 times a day   - Out of bed for meals 2 times a day  - Out of bed for toileting  - Record patient progress and toleration of activity level   Outcome: Progressing  Goal: Patient will remain free of falls  Description: INTERVENTIONS:  - Educate patient/family on patient safety including physical limitations  - Instruct patient to call for assistance with activity   - Consult OT/PT to assist with strengthening/mobility   - Keep Call bell within reach  - Keep bed low and locked with side rails adjusted as appropriate  - Keep care items and personal belongings within reach  - Initiate and maintain comfort rounds  - Make Fall Risk Sign visible to staff  - Offer Toileting every 2 Hours, in advance of need  - Initiate/Maintain alarm  - Obtain necessary fall risk management equipment: socks  - Apply yellow socks and bracelet for high fall risk patients  - Consider moving patient to room near nurses station  Outcome: Progressing     Problem: DISCHARGE PLANNING  Goal: Discharge to home or other facility with appropriate resources  Description: INTERVENTIONS:  - Identify barriers to discharge w/patient and caregiver  - Arrange for needed discharge resources and transportation as appropriate  - Identify discharge learning needs (meds, wound care, etc )  - Arrange for interpretive services to assist at discharge as needed  - Refer to Case Management Department for coordinating discharge planning if the patient needs post-hospital services based on physician/advanced practitioner order or complex needs related to functional status, cognitive ability, or social support system  Outcome: Progressing     Problem: Knowledge Deficit  Goal: Patient/family/caregiver demonstrates understanding of disease process, treatment plan, medications, and discharge instructions  Description: Complete learning assessment and assess knowledge base    Interventions:  - Provide teaching at level of understanding  - Provide teaching via preferred learning methods  Outcome: Progressing     Problem: Potential for Falls  Goal: Patient will remain free of falls  Description: INTERVENTIONS:  - Educate patient/family on patient safety including physical limitations  - Instruct patient to call for assistance with activity   - Consult OT/PT to assist with strengthening/mobility   - Keep Call bell within reach  - Keep bed low and locked with side rails adjusted as appropriate  - Keep care items and personal belongings within reach  - Initiate and maintain comfort rounds  - Make Fall Risk Sign visible to staff  - Offer Toileting every 2 Hours, in advance of need  - Initiate/Maintain alarm  - Obtain necessary fall risk management equipment: socks  - Apply yellow socks and bracelet for high fall risk patients  - Consider moving patient to room near nurses station  Outcome: Progressing     Problem: CARDIOVASCULAR - ADULT  Goal: Maintains optimal cardiac output and hemodynamic stability  Description: INTERVENTIONS:  - Monitor I/O, vital signs and rhythm  - Monitor for S/S and trends of decreased cardiac output  - Administer and titrate ordered vasoactive medications to optimize hemodynamic stability  - Assess quality of pulses, skin color and temperature  - Assess for signs of decreased coronary artery perfusion  - Instruct patient to report change in severity of symptoms  Outcome: Progressing  Goal: Absence of cardiac dysrhythmias or at baseline rhythm  Description: INTERVENTIONS:  - Continuous cardiac monitoring, vital signs, obtain 12 lead EKG if ordered  - Administer antiarrhythmic and heart rate control medications as ordered  - Monitor electrolytes and administer replacement therapy as ordered  Outcome: Progressing     Problem: RESPIRATORY - ADULT  Goal: Achieves optimal ventilation and oxygenation  Description: INTERVENTIONS:  - Assess for changes in respiratory status  - Assess for changes in mentation and behavior  - Position to facilitate oxygenation and minimize respiratory effort  - Oxygen administered by appropriate delivery if ordered  - Initiate smoking cessation education as indicated  - Encourage broncho-pulmonary hygiene including cough, deep breathe, Incentive Spirometry  - Assess the need for suctioning and aspirate as needed  - Assess and instruct to report SOB or any respiratory difficulty  - Respiratory Therapy support as indicated  Outcome: Progressing     Problem: METABOLIC, FLUID AND ELECTROLYTES - ADULT  Goal: Electrolytes maintained within normal limits  Description: INTERVENTIONS:  - Monitor labs and assess patient for signs and symptoms of electrolyte imbalances  - Administer electrolyte replacement as ordered  - Monitor response to electrolyte replacements, including repeat lab results as appropriate  - Instruct patient on fluid and nutrition as appropriate  Outcome: Progressing  Goal: Fluid balance maintained  Description: INTERVENTIONS:  - Monitor labs   - Monitor I/O and WT  - Instruct patient on fluid and nutrition as appropriate  - Assess for signs & symptoms of volume excess or deficit  Outcome: Progressing  Goal: Glucose maintained within target range  Description: INTERVENTIONS:  - Monitor Blood Glucose as ordered  - Assess for signs and symptoms of hyperglycemia and hypoglycemia  - Administer ordered medications to maintain glucose within target range  - Assess nutritional intake and initiate nutrition service referral as needed  Outcome: Progressing     Problem: SKIN/TISSUE INTEGRITY - ADULT  Goal: Skin Integrity remains intact(Skin Breakdown Prevention)  Description: Assess:  -Perform Ashish assessment every shift  -Clean and moisturize skin every shift  -Inspect skin when repositioning, toileting, and assisting with ADLS  -Assess extremities for adequate circulation and sensation     Bed Management:  -Have minimal linens on bed & keep smooth, unwrinkled  -Change linens as needed when moist or perspiring  -Avoid sitting or lying in one position for more than 2 hours while in bed  -Keep HOB at 30 degrees     Toileting:  -Offer bedside commode  -Assess for incontinence every shift  -Use incontinent care products after each incontinent episode such as chucks    Activity:  -Mobilize patient 3 times a day  -Encourage activity and walks on unit  -Encourage or provide ROM exercises   -Turn and reposition patient every 2 Hours  -Use appropriate equipment to lift or move patient in bed  -Instruct/ Assist with weight shifting every shift when out of bed in chair  -Consider limitation of chair time 2 hour intervals    Skin Care:  -Avoid use of baby powder, tape, friction and shearing, hot water or constrictive clothing  -Relieve pressure over bony prominences using weight shifting  -Do not massage red bony areas    Next Steps:  -Teach patient strategies to minimize risks such as weight shifting   -Consider consults to  interdisciplinary teams such as   Outcome: Progressing     Problem: HEMATOLOGIC - ADULT  Goal: Maintains hematologic stability  Description: INTERVENTIONS  - Assess for signs and symptoms of bleeding or hemorrhage  - Monitor labs  - Administer supportive blood products/factors as ordered and appropriate  Outcome: Progressing     Problem: MUSCULOSKELETAL - ADULT  Goal: Maintain or return mobility to safest level of function  Description: INTERVENTIONS:  - Assess patient's ability to carry out ADLs; assess patient's baseline for ADL function and identify physical deficits which impact ability to perform ADLs (bathing, care of mouth/teeth, toileting, grooming, dressing, etc )  - Assess/evaluate cause of self-care deficits   - Assess range of motion  - Assess patient's mobility  - Assess patient's need for assistive devices and provide as appropriate  - Encourage maximum independence but intervene and supervise when necessary  - Involve family in performance of ADLs  - Assess for home care needs following discharge   - Consider OT consult to assist with ADL evaluation and planning for discharge  - Provide patient education as appropriate  Outcome: Progressing

## 2022-11-16 NOTE — CASE MANAGEMENT
Case Management Discharge Planning Note    Patient name Dorina Snellen  Location [facility] [address] 339/-48 MRN [id number]  : 1960 Date 2022       Current Admission Date: 2022  Current Admission Diagnosis:Suspected Primary malignant neoplasm of left lung metastatic to other site [facility])   Patient Active Problem List    Diagnosis Date Noted   • Type 2 diabetes mellitus without complication, without long-term current use of insulin ([place] [address] ) 2022   • Thrombocytopenia ([place] [address] ) 2022   • Severe protein-calorie malnutrition ([place] [address] ) 2022   • Anemia of chronic disease 11/10/2022   • SIRS (systemic inflammatory response syndrome) ([place] [address] ) 2022   • Suspected Primary malignant neoplasm of left lung metastatic to other site [facility]) 2022   • Abnormal CT scan, gallbladder 2022   • Schizophrenia ([place] [address] ) 2022   • Elevated glucose 2022   • Pedal edema 2022      LOS (days): 13  Geometric Mean LOS (GMLOS) (days): 2 10  Days to GMLOS:-11 2     OBJECTIVE:  Risk of Unplanned Readmission Score: 25 98         Current admission status: Inpatient   Preferred Pharmacy:   [address] [place] [id number]  Phone: [phone] Fax: [phone]    Primary Care Provider: No primary care provider on file  Primary Insurance: MEDICARE  Secondary Insurance: [facility]    DISCHARGE DETAILS:                                                                                        IMM Given (Date):: 22        Additional Comments: Cm spoke with pts friend Vannessa Riley regarding planning  for dc  CM discussed that pt may be ready for dc tomorrow to a rehab setting  Referrals are out and facilities are reviewing  Pt and his friend are planning for rehab and then meeting with OP Onc regarding treatment for CA

## 2022-11-16 NOTE — CASE MANAGEMENT
Case Management Progress Note    Patient name Marah Orantes  Location [facility] 339/-04 MRN [id number]  : 1960 Date 2022       LOS (days): 13  Geometric Mean LOS (GMLOS) (days): 2 10  Days to GMLOS:-11 4        OBJECTIVE:        Current admission status: Inpatient  Preferred Pharmacy:   [address] [place] [address], [address] [place] [id number]  Phone: [phone] Fax: [phone]    Primary Care Provider: No primary care provider on file  Primary Insurance: MEDICARE  Secondary Insurance: [facility]    PROGRESS NOTE: Referrals updated  Vm left for Rhea Jordan in admissions at [facility]   Pts friend indicated that would be the preferred facility if they have a bed open

## 2022-11-16 NOTE — ASSESSMENT & PLAN NOTE
Lab Results   Component Value Date    HGBA1C 9 1 (H) 11/02/2022       Recent Labs     11/15/22  2105 11/16/22  0739 11/16/22  1056 11/16/22  1606   POCGLU 111 99 137 335*       Blood Sugar Average: Last 72 hrs:  (P) 203 75     See mgx of elevated glucose

## 2022-11-16 NOTE — PROGRESS NOTES
[place]  Progress Note Miguel Faster 1960, 58 y o  male MRN: [id number]  Unit/Bed#: -01 Encounter: [id number]  Primary Care Provider: No primary care provider on file  Date and time admitted to hospital: 11/2/2022  9:13 PM    * Suspected Primary malignant neoplasm of left lung metastatic to other site [facility])  Assessment & Plan  · Presents with generalized weakness, fatigue, and weight loss  · CT a/P: " 2 5 x 2 2 x 2 2 cm mass in the left lower lobe concerning for lung cancer  Lytic lesions within the left 6th rib and pelvis bones compatible with osseous metastasis "  · Longstanding smoking history  · Will ask pulm to evaluate  · Heme-Onc following       · patient will need lifelong anticoagulation d/t stage IV malignancy-> will need to transition to Eliquis or fondaparinux  · IR tissue biopsy performed - 11/8- positive for metastatic lung adenocarcinoma  · Palliative care following  · Guardian requesting treatment with chemo and possible immunotherapy    F/u with h/o and palliative outpt    Type 2 diabetes mellitus without complication, without long-term current use of insulin [facility])  Assessment & Plan  Lab Results   Component Value Date    HGBA1C 9 1 (H) 11/02/2022       Recent Labs     11/15/22  2105 11/16/22  0739 11/16/22  1056 11/16/22  1606   POCGLU 111 99 137 335*       Blood Sugar Average: Last 72 hrs:  (P) 203 75     See mgx of elevated glucose    Thrombocytopenia (HCC)  Assessment & Plan  D/w hematology on call  continue argatroban Gtt  Monitor platelets  improving    Severe protein-calorie malnutrition (HCC)  Assessment & Plan  Malnutrition Findings:   Adult Malnutrition type: Chronic illness  Adult Degree of Malnutrition: Other severe protein calorie malnutrition  Malnutrition Characteristics: Fat loss, Muscle loss                  360 Statement: Pt presents with severe protein calorie malnutrition as evidenced by orbital hollowing, temporal scooping and prominent clavicle bone protrusion  BMI Findings: Body mass index is 23 63 kg/m²  Anemia of chronic disease  Assessment & Plan  Severe anemia, progressive   Likely related to malignancy  No sx of bleeding  Transfused 1 unit with improvement       Pedal edema  Assessment & Plan  · B/l pedal edema (R>L) for a couple of days   · Due to probable newly diagnosed metastatic cancer on CT scan, which demonstrated DVTs     Elevated glucose  Assessment & Plan  · Serum glucose on admission is 233   · No prior reported history of DM, however patient poor historian  · hemoglobin A1c 9 1  · started on insulin regimen, increased w improvement  · ISS  · DM diet  · Endo consulted  · Lantus 13 U qhs  · Humalog 7U tid  · DM education    F/u outpt    Schizophrenia ([facility] [address] )  Assessment & Plan  · Self-reported history of schizophrenia   · Reportedly treated with clonazepam 100 mg HS, however patient reports non adherence with this   · Presented to the ED with acute psychosis requiring Haldol 5 mg IM x1  · Neuro- psychiatry was consulted because of question about patient's capacity  Patient did not seem to have insight into his condition  He does not have capacity to make medical decisions at this time  · Neuro psych consult repeated as mentation has improved however continues to lack insight    Abnormal CT scan, gallbladder  Assessment & Plan  · Distended gallbladder on CT scan   · Obtained right upper quadrant ultrasound patient also with elevated T bili and alk-phos  · Completed zosyn  No acute surgical needs by general surgery   No suspicion for cholecystitis per surg    F/u with surgery outpt    SIRS (systemic inflammatory response syndrome) (HCC)  Assessment & Plan  · Sirs criteria met on admission:  Tachycardia and leukocytosis at 17 36 K without bandemia   · No severe sepsis criteria met   · Suspect reactive from suspected metastatic lung cancer as seen on CT scan   · UA without infection   · Procalcitonin elevated at 11 67-> 3    · ? Elevation secondary to infection or possible small cell lung cancer  · CT scan does show distended gallbladder and with elevated T bili and alk-phos, received IV Zosyn to cover for possible infection  Surgery suggests no acute surgical intervention  · Blood cultures x2, neg  · ID s/o  · Fever curve improving  Observing off abx  Fevers felt 2/2 malignancy, dvt etc       Hypokalemia-resolved as of 2022  Assessment & Plan  Replete aggressively  resolved    Hyponatremia-resolved as of 11/15/2022  Assessment & Plan  · resolved    Hypercalcemia-resolved as of 2022  Assessment & Plan  · PTH low, hypercalcemia of malignancy is etiology   · apprec nephrology assistance  · S/p IVF, lasix, zometa  · resolved        VTE Pharmacologic Prophylaxis: VTE Score: 4 Moderate Risk (Score 3-4) - Pharmacological DVT Prophylaxis Ordered: argatroban  Patient Centered Rounds: I performed bedside rounds with nursing staff today  Discussions with Specialists or Other Care Team Provider: CM, h/o, palliative    Education and Discussions with Family / Patient: Updated  (friend) via phone  Time Spent for Care: 30 minutes  More than 50% of total time spent on counseling and coordination of care as described above  Current Length of Stay: 13 day(s)  Current Patient Status: Inpatient   Certification Statement: 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  Discharge Plan: Anticipate discharge tomorrow to rehab facility  Code Status: Level 1 - Full Code    Subjective:   Tito Garland was seen and examined at bedside  No acute events overnight  Discussed plan of care  All questions and concerns were answered and addressed      Objective:     Vitals:   Temp (24hrs), Av 5 °F (36 9 °C), Min:98 2 °F (36 8 °C), Max:98 9 °F (37 2 °C)    Temp:  [98 2 °F (36 8 °C)-98 9 °F (37 2 °C)] 98 9 °F (37 2 °C)  HR:  [109-141] 109  Resp:  [17] 17  BP: (121-140)/(67-86) 121/67  SpO2:  [95 %-96 %] 96 %  Body mass index is 23 63 kg/m²  Input and Output Summary (last 24 hours): Intake/Output Summary (Last 24 hours) at 11/16/2022 1748  Last data filed at 11/16/2022 0501  Gross per 24 hour   Intake 100 ml   Output --   Net 100 ml       Physical Exam:   Physical Exam   Vitals and nursing note reviewed  Constitutional:       Appearance: He is not ill-appearing       Comments: Thin frail   HENT:      Head: Normocephalic and atraumatic  Cardiovascular:      Rate and Rhythm: Regular rhythm  Tachycardia present       Pulses: Normal pulses       Heart sounds: Normal heart sounds  Pulmonary:      Effort: Pulmonary effort is normal       Breath sounds: Normal breath sounds  Abdominal:      General: Abdomen is flat  Bowel sounds are normal       Palpations: Abdomen is soft  Musculoskeletal:      Right lower leg: No edema       Left lower leg: No edema  Skin:     General: Skin is warm  Neurological:      Mental Status: He is alert  Mental status is at baseline      Additional Data:     Labs:  Results from last 7 days   Lab Units 11/16/22  0333 11/15/22  0140   WBC Thousand/uL 13 03* 14 38*   HEMOGLOBIN g/dL 7 5* 8 0*   HEMATOCRIT % 23 1* 23 8*   PLATELETS Thousands/uL 134* 94*   NEUTROS PCT %  --  75   LYMPHS PCT %  --  13*   MONOS PCT %  --  9   EOS PCT %  --  1     Results from last 7 days   Lab Units 11/16/22  0332   SODIUM mmol/L 134*   POTASSIUM mmol/L 4 1   CHLORIDE mmol/L 101   CO2 mmol/L 28   BUN mg/dL 26*   CREATININE mg/dL 0 85   ANION GAP mmol/L 5   CALCIUM mg/dL 8 3   ALBUMIN g/dL 1 9*   TOTAL BILIRUBIN mg/dL 0 50   ALK PHOS U/L 89   ALT U/L 15   AST U/L 18   GLUCOSE RANDOM mg/dL 102     Results from last 7 days   Lab Units 11/16/22  0324   INR  2 34*     Results from last 7 days   Lab Units 11/16/22  1606 11/16/22  1056 11/16/22  0739 11/15/22  2105 11/15/22  1659 11/15/22  1121 11/15/22  0735 11/15/22  0125 11/14/22  2106 11/14/22  1613 11/14/22  1227 11/14/22  0728   POC GLUCOSE mg/dl 335* 137 99 111 155* 173* 191* 191* 445* 284* 235* 163*               Lines/Drains:  Invasive Devices     Peripheral Intravenous Line  Duration           Peripheral IV 11/13/22 Left;Upper;Ventral (anterior) Arm 3 days                      Imaging: No pertinent imaging reviewed  Recent Cultures (last 7 days):         Last 24 Hours Medication List:   Current Facility-Administered Medications   Medication Dose Route Frequency Provider Last Rate   • acetaminophen  975 mg Oral Q8H [address], DO     • argatroban  0 1-3 mcg/kg/min Intravenous Titrated Binta Mcdaniel MD 1 mcg/kg/min (11/16/22 1448)   • oxyCODONE  5 mg Oral Q3H PRN Evita Alford DO      Or   • HYDROmorphone  0 2 mg Intravenous Q3H PRN Evita Alford DO     • influenza vaccine  0 5 mL Intramuscular Prior to discharge Winnie Tavarez PA-C     • insulin glargine  13 Units Subcutaneous HS Binta Mcdaniel MD     • insulin lispro  1-5 Units Subcutaneous TID AC Winnie Tavarez PA-C     • insulin lispro  2-12 Units Subcutaneous HS Darrell Pool PA-C     • insulin lispro  7 Units Subcutaneous TID With Meals Kaylan Hope MD     • lidocaine  1 patch Topical Daily Lexie Gitelman, MD     • lidocaine  1 patch Topical Daily Lexie Gitelman, MD     • nicotine  1 patch Transdermal Daily Winnie Tavarez PA-C     • OLANZapine  5 mg Oral HS Lexie Gitelman, MD     • ondansetron  4 mg Intravenous Q6H PRN Winnie Tavarez PA-C     • oxyCODONE  5 mg Oral Q6H Kalyn Cash DO     • polyethylene glycol  17 g Oral Daily PRN Evita Alford DO     • senna  1 tablet Oral HS Kalyn Ray DO          Today, Patient Was Seen By: Binta Mcdaniel MD    **Please Note: This note may have been constructed using a voice recognition system  **

## 2022-11-16 NOTE — ASSESSMENT & PLAN NOTE
· Presents with generalized weakness, fatigue, and weight loss  · CT a/P: " 2 5 x 2 2 x 2 2 cm mass in the left lower lobe concerning for lung cancer  Lytic lesions within the left 6th rib and pelvis bones compatible with osseous metastasis "  · Longstanding smoking history  · Will ask pulm to evaluate  · Heme-Onc following       · patient will need lifelong anticoagulation d/t stage IV malignancy-> will need to transition to Eliquis or fondaparinux  · IR tissue biopsy performed - 11/8- positive for metastatic lung adenocarcinoma  · Palliative care following  · Guardian requesting treatment with chemo and possible immunotherapy    F/u with h/o and palliative outpt

## 2022-11-17 ENCOUNTER — TELEPHONE (OUTPATIENT)
Dept: HEMATOLOGY ONCOLOGY | Facility: CLINIC | Age: 62
End: 2022-11-17

## 2022-11-17 ENCOUNTER — PATIENT OUTREACH (OUTPATIENT)
Dept: HEMATOLOGY ONCOLOGY | Facility: CLINIC | Age: 62
End: 2022-11-17

## 2022-11-17 LAB
ABO GROUP BLD: NORMAL
ALBUMIN SERPL BCP-MCNC: 1.9 G/DL (ref 3.5–5)
ALP SERPL-CCNC: 90 U/L (ref 46–116)
ALT SERPL W P-5'-P-CCNC: 19 U/L (ref 12–78)
ANION GAP SERPL CALCULATED.3IONS-SCNC: 6 MMOL/L (ref 4–13)
APTT PPP: 40 SECONDS (ref 23–37)
AST SERPL W P-5'-P-CCNC: 16 U/L (ref 5–45)
BASOPHILS # BLD AUTO: 0.03 THOUSANDS/ÂΜL (ref 0–0.1)
BASOPHILS NFR BLD AUTO: 0 % (ref 0–1)
BILIRUB SERPL-MCNC: 0.4 MG/DL (ref 0.2–1)
BLD GP AB SCN SERPL QL: NEGATIVE
BUN SERPL-MCNC: 27 MG/DL (ref 5–25)
CALCIUM ALBUM COR SERPL-MCNC: 9.5 MG/DL (ref 8.3–10.1)
CALCIUM SERPL-MCNC: 7.8 MG/DL (ref 8.3–10.1)
CHLORIDE SERPL-SCNC: 100 MMOL/L (ref 96–108)
CO2 SERPL-SCNC: 28 MMOL/L (ref 21–32)
CREAT SERPL-MCNC: 0.96 MG/DL (ref 0.6–1.3)
EOSINOPHIL # BLD AUTO: 0.07 THOUSAND/ÂΜL (ref 0–0.61)
EOSINOPHIL NFR BLD AUTO: 1 % (ref 0–6)
ERYTHROCYTE [DISTWIDTH] IN BLOOD BY AUTOMATED COUNT: 16.2 % (ref 11.6–15.1)
GFR SERPL CREATININE-BSD FRML MDRD: 84 ML/MIN/1.73SQ M
GLUCOSE SERPL-MCNC: 119 MG/DL (ref 65–140)
GLUCOSE SERPL-MCNC: 185 MG/DL (ref 65–140)
GLUCOSE SERPL-MCNC: 195 MG/DL (ref 65–140)
GLUCOSE SERPL-MCNC: 235 MG/DL (ref 65–140)
GLUCOSE SERPL-MCNC: 240 MG/DL (ref 65–140)
HCT VFR BLD AUTO: 21.1 % (ref 36.5–49.3)
HCT VFR BLD AUTO: 21.1 % (ref 36.5–49.3)
HCT VFR BLD AUTO: 25.6 % (ref 36.5–49.3)
HGB BLD-MCNC: 6.9 G/DL (ref 12–17)
HGB BLD-MCNC: 6.9 G/DL (ref 12–17)
HGB BLD-MCNC: 8.4 G/DL (ref 12–17)
IMM GRANULOCYTES # BLD AUTO: 0.15 THOUSAND/UL (ref 0–0.2)
IMM GRANULOCYTES NFR BLD AUTO: 1 % (ref 0–2)
LYMPHOCYTES # BLD AUTO: 1.6 THOUSANDS/ÂΜL (ref 0.6–4.47)
LYMPHOCYTES NFR BLD AUTO: 13 % (ref 14–44)
MCH RBC QN AUTO: 31.4 PG (ref 26.8–34.3)
MCHC RBC AUTO-ENTMCNC: 32.7 G/DL (ref 31.4–37.4)
MCV RBC AUTO: 96 FL (ref 82–98)
MONOCYTES # BLD AUTO: 1.09 THOUSAND/ÂΜL (ref 0.17–1.22)
MONOCYTES NFR BLD AUTO: 9 % (ref 4–12)
NEUTROPHILS # BLD AUTO: 9.82 THOUSANDS/ÂΜL (ref 1.85–7.62)
NEUTS SEG NFR BLD AUTO: 76 % (ref 43–75)
NRBC BLD AUTO-RTO: 0 /100 WBCS
PLATELET # BLD AUTO: 197 THOUSANDS/UL (ref 149–390)
PMV BLD AUTO: 10.3 FL (ref 8.9–12.7)
POTASSIUM SERPL-SCNC: 4 MMOL/L (ref 3.5–5.3)
PROT SERPL-MCNC: 5.7 G/DL (ref 6.4–8.4)
RBC # BLD AUTO: 2.2 MILLION/UL (ref 3.88–5.62)
RH BLD: POSITIVE
SODIUM SERPL-SCNC: 134 MMOL/L (ref 135–147)
SPECIMEN EXPIRATION DATE: NORMAL
WBC # BLD AUTO: 12.76 THOUSAND/UL (ref 4.31–10.16)

## 2022-11-17 PROCEDURE — 30233N1 TRANSFUSION OF NONAUTOLOGOUS RED BLOOD CELLS INTO PERIPHERAL VEIN, PERCUTANEOUS APPROACH: ICD-10-PCS | Performed by: STUDENT IN AN ORGANIZED HEALTH CARE EDUCATION/TRAINING PROGRAM

## 2022-11-17 RX ORDER — ARGATROBAN 1 MG/ML
.1-3 INJECTION INTRAVENOUS
Status: DISCONTINUED | OUTPATIENT
Start: 2022-11-17 | End: 2022-11-17

## 2022-11-17 RX ORDER — INSULIN GLARGINE 100 [IU]/ML
15 INJECTION, SOLUTION SUBCUTANEOUS
Status: DISCONTINUED | OUTPATIENT
Start: 2022-11-17 | End: 2022-11-18 | Stop reason: HOSPADM

## 2022-11-17 RX ADMIN — OXYCODONE HYDROCHLORIDE 5 MG: 5 TABLET ORAL at 09:04

## 2022-11-17 RX ADMIN — ARGATROBAN 1 MCG/KG/MIN: 1 INJECTION, SOLUTION INTRAVENOUS at 06:57

## 2022-11-17 RX ADMIN — ACETAMINOPHEN 975 MG: 325 TABLET, FILM COATED ORAL at 22:50

## 2022-11-17 RX ADMIN — INSULIN LISPRO 2 UNITS: 100 INJECTION, SOLUTION INTRAVENOUS; SUBCUTANEOUS at 09:05

## 2022-11-17 RX ADMIN — OXYCODONE HYDROCHLORIDE 5 MG: 5 TABLET ORAL at 03:46

## 2022-11-17 RX ADMIN — INSULIN GLARGINE 15 UNITS: 100 INJECTION, SOLUTION SUBCUTANEOUS at 22:50

## 2022-11-17 RX ADMIN — APIXABAN 5 MG: 5 TABLET, FILM COATED ORAL at 20:40

## 2022-11-17 RX ADMIN — OXYCODONE HYDROCHLORIDE 5 MG: 5 TABLET ORAL at 17:29

## 2022-11-17 RX ADMIN — INSULIN LISPRO 7 UNITS: 100 INJECTION, SOLUTION INTRAVENOUS; SUBCUTANEOUS at 09:05

## 2022-11-17 RX ADMIN — OXYCODONE HYDROCHLORIDE 5 MG: 5 TABLET ORAL at 20:39

## 2022-11-17 RX ADMIN — INSULIN LISPRO 7 UNITS: 100 INJECTION, SOLUTION INTRAVENOUS; SUBCUTANEOUS at 13:11

## 2022-11-17 RX ADMIN — OLANZAPINE 5 MG: 5 TABLET, FILM COATED ORAL at 22:50

## 2022-11-17 RX ADMIN — ACETAMINOPHEN 975 MG: 325 TABLET, FILM COATED ORAL at 13:11

## 2022-11-17 RX ADMIN — INSULIN LISPRO 1 UNITS: 100 INJECTION, SOLUTION INTRAVENOUS; SUBCUTANEOUS at 13:11

## 2022-11-17 RX ADMIN — NICOTINE 1 PATCH: 21 PATCH, EXTENDED RELEASE TRANSDERMAL at 09:04

## 2022-11-17 RX ADMIN — ARGATROBAN 1 MCG/KG/MIN: 1 INJECTION, SOLUTION INTRAVENOUS at 01:41

## 2022-11-17 RX ADMIN — ACETAMINOPHEN 975 MG: 325 TABLET, FILM COATED ORAL at 06:29

## 2022-11-17 RX ADMIN — INSULIN LISPRO 4 UNITS: 100 INJECTION, SOLUTION INTRAVENOUS; SUBCUTANEOUS at 22:51

## 2022-11-17 RX ADMIN — HYDROMORPHONE HYDROCHLORIDE 0.2 MG: 0.2 INJECTION, SOLUTION INTRAMUSCULAR; INTRAVENOUS; SUBCUTANEOUS at 04:38

## 2022-11-17 RX ADMIN — SENNOSIDES 8.6 MG: 8.6 TABLET, FILM COATED ORAL at 22:50

## 2022-11-17 RX ADMIN — INSULIN LISPRO 7 UNITS: 100 INJECTION, SOLUTION INTRAVENOUS; SUBCUTANEOUS at 17:30

## 2022-11-17 RX ADMIN — APIXABAN 5 MG: 5 TABLET, FILM COATED ORAL at 13:11

## 2022-11-17 NOTE — PLAN OF CARE
Problem: Nutrition/Hydration-ADULT  Goal: Nutrient/Hydration intake appropriate for improving, restoring or maintaining nutritional needs  Description: Monitor and assess patient's nutrition/hydration status for malnutrition  Collaborate with interdisciplinary team and initiate plan and interventions as ordered  Monitor patient's weight and dietary intake as ordered or per policy  Utilize nutrition screening tool and intervene as necessary  Determine patient's food preferences and provide high-protein, high-caloric foods as appropriate       INTERVENTIONS:  - Monitor oral intake, urinary output, labs, and treatment plans  - Assess nutrition and hydration status and recommend course of action  - Evaluate amount of meals eaten  - Assist patient with eating if necessary   - Allow adequate time for meals  - Recommend/ encourage appropriate diets, oral nutritional supplements, and vitamin/mineral supplements  - Order, calculate, and assess calorie counts as needed  - Recommend, monitor, and adjust tube feedings and TPN/PPN based on assessed needs  - Assess need for intravenous fluids  - Provide specific nutrition/hydration education as appropriate  - Include patient/family/caregiver in decisions related to nutrition  Outcome: Progressing     Problem: MOBILITY - ADULT  Goal: Maintain or return to baseline ADL function  Description: INTERVENTIONS:  -  Assess patient's ability to carry out ADLs; assess patient's baseline for ADL function and identify physical deficits which impact ability to perform ADLs (bathing, care of mouth/teeth, toileting, grooming, dressing, etc )  - Assess/evaluate cause of self-care deficits   - Assess range of motion  - Assess patient's mobility; develop plan if impaired  - Assess patient's need for assistive devices and provide as appropriate  - Encourage maximum independence but intervene and supervise when necessary  - Involve family in performance of ADLs  - Assess for home care needs following discharge   - Consider OT consult to assist with ADL evaluation and planning for discharge  - Provide patient education as appropriate  Outcome: Progressing  Goal: Maintains/Returns to pre admission functional level  Description: INTERVENTIONS:  - Perform BMAT or MOVE assessment daily    - Set and communicate daily mobility goal to care team and patient/family/caregiver  - Collaborate with rehabilitation services on mobility goals if consulted  - Perform Range of Motion  times a day  - Reposition patient every  hours    - Dangle patient  times a day  - Stand patient  times a day  - Ambulate patient  times a day  - Out of bed to chair  times a day   - Out of bed for meals  times a day  - Out of bed for toileting  - Record patient progress and toleration of activity level   Outcome: Progressing     Problem: Prexisting or High Potential for Compromised Skin Integrity  Goal: Skin integrity is maintained or improved  Description: INTERVENTIONS:  - Identify patients at risk for skin breakdown  - Assess and monitor skin integrity  - Assess and monitor nutrition and hydration status  - Monitor labs   - Assess for incontinence   - Turn and reposition patient  - Assist with mobility/ambulation  - Relieve pressure over bony prominences  - Avoid friction and shearing  - Provide appropriate hygiene as needed including keeping skin clean and dry  - Evaluate need for skin moisturizer/barrier cream  - Collaborate with interdisciplinary team   - Patient/family teaching  - Consider wound care consult   Outcome: Progressing     Problem: PAIN - ADULT  Goal: Verbalizes/displays adequate comfort level or baseline comfort level  Description: Interventions:  - Encourage patient to monitor pain and request assistance  - Assess pain using appropriate pain scale  - Administer analgesics based on type and severity of pain and evaluate response  - Implement non-pharmacological measures as appropriate and evaluate response  - Consider cultural and social influences on pain and pain management  - Notify physician/advanced practitioner if interventions unsuccessful or patient reports new pain  Outcome: Progressing     Problem: INFECTION - ADULT  Goal: Absence or prevention of progression during hospitalization  Description: INTERVENTIONS:  - Assess and monitor for signs and symptoms of infection  - Monitor lab/diagnostic results  - Monitor all insertion sites, i e  indwelling lines, tubes, and drains  - Monitor endotracheal if appropriate and nasal secretions for changes in amount and color  - [place] appropriate cooling/warming therapies per order  - Administer medications as ordered  - Instruct and encourage patient and family to use good hand hygiene technique  - Identify and instruct in appropriate isolation precautions for identified infection/condition  Outcome: Progressing  Goal: Absence of fever/infection during neutropenic period  Description: INTERVENTIONS:  - Monitor WBC    Outcome: Progressing     Problem: SAFETY ADULT  Goal: Maintain or return to baseline ADL function  Description: INTERVENTIONS:  -  Assess patient's ability to carry out ADLs; assess patient's baseline for ADL function and identify physical deficits which impact ability to perform ADLs (bathing, care of mouth/teeth, toileting, grooming, dressing, etc )  - Assess/evaluate cause of self-care deficits   - Assess range of motion  - Assess patient's mobility; develop plan if impaired  - Assess patient's need for assistive devices and provide as appropriate  - Encourage maximum independence but intervene and supervise when necessary  - Involve family in performance of ADLs  - Assess for home care needs following discharge   - Consider OT consult to assist with ADL evaluation and planning for discharge  - Provide patient education as appropriate  Outcome: Progressing  Goal: Maintains/Returns to pre admission functional level  Description: INTERVENTIONS:  - Perform BMAT or MOVE assessment daily    - Set and communicate daily mobility goal to care team and patient/family/caregiver  - Collaborate with rehabilitation services on mobility goals if consulted  - Perform Range of Motion  times a day  - Reposition patient every  hours    - Dangle patient  times a day  - Stand patient  times a day  - Ambulate patient  times a day  - Out of bed to chair  times a day   - Out of bed for meals  times a day  - Out of bed for toileting  - Record patient progress and toleration of activity level   Outcome: Progressing  Goal: Patient will remain free of falls  Description: INTERVENTIONS:  - Educate patient/family on patient safety including physical limitations  - Instruct patient to call for assistance with activity   - Consult OT/PT to assist with strengthening/mobility   - Keep Call bell within reach  - Keep bed low and locked with side rails adjusted as appropriate  - Keep care items and personal belongings within reach  - Initiate and maintain comfort rounds  - Make Fall Risk Sign visible to staff  - Offer Toileting every  Hours, in advance of need  - Initiate/Maintain alarm  - Obtain necessary fall risk management equipment:   - Apply yellow socks and bracelet for high fall risk patients  - Consider moving patient to room near nurses station  Outcome: Progressing     Problem: DISCHARGE PLANNING  Goal: Discharge to home or other facility with appropriate resources  Description: INTERVENTIONS:  - Identify barriers to discharge w/patient and caregiver  - Arrange for needed discharge resources and transportation as appropriate  - Identify discharge learning needs (meds, wound care, etc )  - Arrange for interpretive services to assist at discharge as needed  - Refer to Case Management Department for coordinating discharge planning if the patient needs post-hospital services based on physician/advanced practitioner order or complex needs related to functional status, cognitive ability, or social support system  Outcome: Progressing     Problem: Knowledge Deficit  Goal: Patient/family/caregiver demonstrates understanding of disease process, treatment plan, medications, and discharge instructions  Description: Complete learning assessment and assess knowledge base    Interventions:  - Provide teaching at level of understanding  - Provide teaching via preferred learning methods  Outcome: Progressing     Problem: Potential for Falls  Goal: Patient will remain free of falls  Description: INTERVENTIONS:  - Educate patient/family on patient safety including physical limitations  - Instruct patient to call for assistance with activity   - Consult OT/PT to assist with strengthening/mobility   - Keep Call bell within reach  - Keep bed low and locked with side rails adjusted as appropriate  - Keep care items and personal belongings within reach  - Initiate and maintain comfort rounds  - Make Fall Risk Sign visible to staff  - Offer Toileting every  Hours, in advance of need  - Initiate/Maintain alarm  - Obtain necessary fall risk management equipment:   - Apply yellow socks and bracelet for high fall risk patients  - Consider moving patient to room near nurses station  Outcome: Progressing     Problem: CARDIOVASCULAR - ADULT  Goal: Maintains optimal cardiac output and hemodynamic stability  Description: INTERVENTIONS:  - Monitor I/O, vital signs and rhythm  - Monitor for S/S and trends of decreased cardiac output  - Administer and titrate ordered vasoactive medications to optimize hemodynamic stability  - Assess quality of pulses, skin color and temperature  - Assess for signs of decreased coronary artery perfusion  - Instruct patient to report change in severity of symptoms  Outcome: Progressing  Goal: Absence of cardiac dysrhythmias or at baseline rhythm  Description: INTERVENTIONS:  - Continuous cardiac monitoring, vital signs, obtain 12 lead EKG if ordered  - Administer antiarrhythmic and heart rate control medications as ordered  - Monitor electrolytes and administer replacement therapy as ordered  Outcome: Progressing     Problem: RESPIRATORY - ADULT  Goal: Achieves optimal ventilation and oxygenation  Description: INTERVENTIONS:  - Assess for changes in respiratory status  - Assess for changes in mentation and behavior  - Position to facilitate oxygenation and minimize respiratory effort  - Oxygen administered by appropriate delivery if ordered  - Initiate smoking cessation education as indicated  - Encourage broncho-pulmonary hygiene including cough, deep breathe, Incentive Spirometry  - Assess the need for suctioning and aspirate as needed  - Assess and instruct to report SOB or any respiratory difficulty  - Respiratory Therapy support as indicated  Outcome: Progressing     Problem: METABOLIC, FLUID AND ELECTROLYTES - ADULT  Goal: Electrolytes maintained within normal limits  Description: INTERVENTIONS:  - Monitor labs and assess patient for signs and symptoms of electrolyte imbalances  - Administer electrolyte replacement as ordered  - Monitor response to electrolyte replacements, including repeat lab results as appropriate  - Instruct patient on fluid and nutrition as appropriate  Outcome: Progressing  Goal: Fluid balance maintained  Description: INTERVENTIONS:  - Monitor labs   - Monitor I/O and WT  - Instruct patient on fluid and nutrition as appropriate  - Assess for signs & symptoms of volume excess or deficit  Outcome: Progressing  Goal: Glucose maintained within target range  Description: INTERVENTIONS:  - Monitor Blood Glucose as ordered  - Assess for signs and symptoms of hyperglycemia and hypoglycemia  - Administer ordered medications to maintain glucose within target range  - Assess nutritional intake and initiate nutrition service referral as needed  Outcome: Progressing     Problem: SKIN/TISSUE INTEGRITY - ADULT  Goal: Skin Integrity remains intact(Skin Breakdown Prevention)  Description: Assess:  -Perform Ashish assessment every   -Clean and moisturize skin every   -Inspect skin when repositioning, toileting, and assisting with ADLS  -Assess under medical devices such as  every   -Assess extremities for adequate circulation and sensation     Bed Management:  -Have minimal linens on bed & keep smooth, unwrinkled  -Change linens as needed when moist or perspiring  -Avoid sitting or lying in one position for more than  hours while in bed  -Keep HOB at degrees     Toileting:  -Offer bedside commode  -Assess for incontinence every   -Use incontinent care products after each incontinent episode such as     Activity:  -Mobilize patient  times a day  -Encourage activity and walks on unit  -Encourage or provide ROM exercises   -Turn and reposition patient every  Hours  -Use appropriate equipment to lift or move patient in bed  -Instruct/ Assist with weight shifting every  when out of bed in chair  -Consider limitation of chair time  hour intervals    Skin Care:  -Avoid use of baby powder, tape, friction and shearing, hot water or constrictive clothing  -Relieve pressure over bony prominences using   -Do not massage red bony areas    Next Steps:  -Teach patient strategies to minimize risks such as    -Consider consults to  interdisciplinary teams such as  Outcome: Progressing     Problem: HEMATOLOGIC - ADULT  Goal: Maintains hematologic stability  Description: INTERVENTIONS  - Assess for signs and symptoms of bleeding or hemorrhage  - Monitor labs  - Administer supportive blood products/factors as ordered and appropriate  Outcome: Progressing     Problem: MUSCULOSKELETAL - ADULT  Goal: Maintain or return mobility to safest level of function  Description: INTERVENTIONS:  - Assess patient's ability to carry out ADLs; assess patient's baseline for ADL function and identify physical deficits which impact ability to perform ADLs (bathing, care of mouth/teeth, toileting, grooming, dressing, etc )  - Assess/evaluate cause of self-care deficits   - Assess range of motion  - Assess patient's mobility  - Assess patient's need for assistive devices and provide as appropriate  - Encourage maximum independence but intervene and supervise when necessary  - Involve family in performance of ADLs  - Assess for home care needs following discharge   - Consider OT consult to assist with ADL evaluation and planning for discharge  - Provide patient education as appropriate  Outcome: Progressing

## 2022-11-17 NOTE — CASE MANAGEMENT
Case Management Progress Note    Patient name Hyun Doe  Location [facility] 339/-98 MRN [id number]  : 1960 Date 2022       LOS (days): 14  Geometric Mean LOS (GMLOS) (days): 2 10  Days to GMLOS:-12 4        OBJECTIVE:        Current admission status: Inpatient  Preferred Pharmacy:   [address] [place] [address], [address] [place] [id number]  Phone: [phone] Fax: [phone]    Primary Care Provider: No primary care provider on file  Primary Insurance: MEDICARE  Secondary Insurance: [facility]    PROGRESS NOTE: VM left for pts friend Gabriele Confer regarding facilities for [employer]  Planning for dc to facility tomorrow    Cm waiting for friend to select facility

## 2022-11-17 NOTE — PROGRESS NOTES
Intake received  Chart reviewed  Patient is currently inpatient  He presented to ED on 11/2/22 for symptoms of generalized weakness and fatigue  Imaging demonstrated findings of bilateral lesions in the lungs with left pleural effusion and multiple bony lesions  IR bone biopsy was completed and pathology is consistent with metastatic adenocarcinoma of the lung   He has been seen by behavioral health and results of neuro psychological exam revealed diffuse cognitive dysfunction  He was deemed to not have capacity to make informed medical decisions secondary to underlying schizophrenia  Hospital follow-up scheduled with Dr Syed Szymnaski on 12/5/22  Will outreach at discharge

## 2022-11-17 NOTE — TELEPHONE ENCOUNTER
11/17/22  Intake received  Medicare A/B  [id number]  [employer]/Active  09/01/22 thru 09/30/22  10/01/22 thru 10/31/22  11/01/22 thru current  Recipient ID: [id number]  No outreach needed at this time

## 2022-11-17 NOTE — QUICK NOTE
Morning Hgb at 6 9 confirmed with repeat H/H  Will transfuse 1U PRBcs  Pt with known anemia previously requiring PRBc on 11/11  Recheck h/h 1 hour post transfusion

## 2022-11-17 NOTE — ASSESSMENT & PLAN NOTE
Severe anemia, progressive   Likely related to malignancy  No sx of bleeding  Transfused 1 unit   Transfused 1U 11/17

## 2022-11-17 NOTE — ASSESSMENT & PLAN NOTE
D/w hematology on call  continue argatroban Gtt-> transitioned to Eliquis  Monitor platelets  improving

## 2022-11-17 NOTE — CASE MANAGEMENT
Case Management Progress Note    Patient name Christiano Mcmanus  Location [facility] 339/-15 MRN [id number]  : 1960 Date 2022       LOS (days): 14  Geometric Mean LOS (GMLOS) (days): 2 10  Days to GMLOS:-12 5        OBJECTIVE:        Current admission status: Inpatient  Preferred Pharmacy:   [address] [place] [address], [address] [place] [id number]  Phone: [phone] Fax: [phone]    Primary Care Provider: No primary care provider on file  Primary Insurance: MEDICARE  Secondary Insurance: [facility]    PROGRESS NOTE: Cm spoke with [facility]   She is reviewing pts referral

## 2022-11-17 NOTE — TELEPHONE ENCOUNTER
Soft Intake Form   Patient Details   Ariadne Lopez     1960     Reason For Appointment   Who is Calling? In-basket   If not patient, Name? Jeannie Thomas    DID YOU CONFIRM INSURANCE WITH PATIENT? Currently inpatient    Who is the Referring Doctor? Hospital follow up    What is the diagnosis? Biopsy-proven Metastatic adenocarcinoma of lung  Has this diagnosis been confirmed by a biopsy/surgery? If yes, what is the date it was done? Yes, 11/8/22   Biopsy done at [place] [address]? If not, where was it done? yes   Was imaging done, and was it done at [facility]? If not, where was it done? yes   Have you been seen by another Oncologist?  If so, who and where (name of facility, city and state) unsure did not speak with the patient    For 2nd Opinions Only: Are you currently undergoing treatment, or are you scheduled to start treatment? If yes, name of facility, city and state n/a    For "History Of" only: Have you completed treatment? n/a    Have you had Genetic Testing done in the past?  If so, advise to bring test results to their visit no   Record Gathering Information   Did you advise to have records faxed to [phone]? no   Did you advise to have disks sent to the proper address with imaging? ("History of" Patients)  5 years of imaging for breast patients-Mammos, US etc no   Scheduling Information   Did you send new patient paperwork? Email or mail? yes   What is the best call back number? (If the [facility] is calling, please use their number) 21 [phone]   Miscellaneous Information    In-basket message:  [place]: [facility]   Provider: 1st available physician   Timeframe: 7-10 days   Diagnosis: Biopsy-proven Metastatic adenocarcinoma of lung     Patient needs to be seen to discuss treatment options   12/5/22 3:20 pm Dr Char Lebron

## 2022-11-17 NOTE — ASSESSMENT & PLAN NOTE
Lab Results   Component Value Date    HGBA1C 9 1 (H) 11/02/2022       Recent Labs     11/16/22  0739 11/16/22  1056 11/16/22  1606 11/16/22  2156   POCGLU 99 137 335* 258*       Blood Sugar Average: Last 72 hrs:  (P) 968 [id number]     See mgx of elevated glucose

## 2022-11-17 NOTE — PROGRESS NOTES
[place]  Progress Note Sunday Labor 1960, 58 y o  male MRN: [id number]  Unit/Bed#: -01 Encounter: [id number]  Primary Care Provider: No primary care provider on file  Date and time admitted to hospital: 11/2/2022  9:13 PM    * Suspected Primary malignant neoplasm of left lung metastatic to other site [facility])  Assessment & Plan  · Presents with generalized weakness, fatigue, and weight loss  · CT a/P: " 2 5 x 2 2 x 2 2 cm mass in the left lower lobe concerning for lung cancer  Lytic lesions within the left 6th rib and pelvis bones compatible with osseous metastasis "  · Longstanding smoking history  · Will ask pulm to evaluate  · Heme-Onc following       · patient will need lifelong anticoagulation d/t stage IV malignancy-> will need to transition to Eliquis or fondaparinux  · IR tissue biopsy performed - 11/8- positive for metastatic lung adenocarcinoma  · Palliative care following  · Guardian requesting treatment with chemo and possible immunotherapy    F/u with h/o and palliative outpt    Type 2 diabetes mellitus without complication, without long-term current use of insulin [facility])  Assessment & Plan  Lab Results   Component Value Date    HGBA1C 9 1 (H) 11/02/2022       Recent Labs     11/16/22  0739 11/16/22  1056 11/16/22  1606 11/16/22  2156   POCGLU 99 137 335* 258*       Blood Sugar Average: Last 72 hrs:  (P) 645 [id number]     See mgx of elevated glucose    Thrombocytopenia (HCC)  Assessment & Plan  D/w hematology on call  continue argatroban Gtt-> transitioned to Eliquis  Monitor platelets  improving    Severe protein-calorie malnutrition (HCC)  Assessment & Plan  Malnutrition Findings:   Adult Malnutrition type: Chronic illness  Adult Degree of Malnutrition: Other severe protein calorie malnutrition  Malnutrition Characteristics: Fat loss, Muscle loss                  360 Statement: Pt presents with severe protein calorie malnutrition as evidenced by orbital hollowing, temporal scooping and prominent clavicle bone protrusion  BMI Findings: Body mass index is 23 63 kg/m²  Anemia of chronic disease  Assessment & Plan  Severe anemia, progressive   Likely related to malignancy  No sx of bleeding  Transfused 1 unit   Transfused 1U 11/17       Pedal edema  Assessment & Plan  · B/l pedal edema (R>L) for a couple of days   · Due to probable newly diagnosed metastatic cancer on CT scan, which demonstrated DVTs     Elevated glucose  Assessment & Plan  · Serum glucose on admission is 233   · No prior reported history of DM, however patient poor historian  · hemoglobin A1c 9 1  · started on insulin regimen, increased w improvement  · ISS  · DM diet  · Endo consulted  · Lantus 15 U qhs  · Humalog 7U tid  · DM education    F/u outpt    Schizophrenia ([facility] [address] )  Assessment & Plan  · Self-reported history of schizophrenia   · Reportedly treated with clonazepam 100 mg HS, however patient reports non adherence with this   · Presented to the ED with acute psychosis requiring Haldol 5 mg IM x1  · Neuro- psychiatry was consulted because of question about patient's capacity  Patient did not seem to have insight into his condition  He does not have capacity to make medical decisions at this time  · Neuro psych consult repeated as mentation has improved however continues to lack insight    Abnormal CT scan, gallbladder  Assessment & Plan  · Distended gallbladder on CT scan   · Obtained right upper quadrant ultrasound patient also with elevated T bili and alk-phos  · Completed zosyn  No acute surgical needs by general surgery   No suspicion for cholecystitis per surg    F/u with surgery outpt    SIRS (systemic inflammatory response syndrome) (HCC)  Assessment & Plan  · Sirs criteria met on admission:  Tachycardia and leukocytosis at 17 36 K without bandemia   · No severe sepsis criteria met   · Suspect reactive from suspected metastatic lung cancer as seen on CT scan · UA without infection   · Procalcitonin elevated at 11 67-> 3    · ? Elevation secondary to infection or possible small cell lung cancer  · CT scan does show distended gallbladder and with elevated T bili and alk-phos, received IV Zosyn to cover for possible infection  Surgery suggests no acute surgical intervention  · Blood cultures x2, neg  · ID s/o  · Fever curve improving  Observing off abx  Fevers felt 2/2 malignancy, dvt etc       Hypokalemia-resolved as of 11/14/2022  Assessment & Plan  Replete aggressively  resolved    Hyponatremia-resolved as of 11/15/2022  Assessment & Plan  · resolved    Hypercalcemia-resolved as of 11/14/2022  Assessment & Plan  · PTH low, hypercalcemia of malignancy is etiology   · apprec nephrology assistance  · S/p IVF, lasix, zometa  · resolved      VTE Pharmacologic Prophylaxis: VTE Score: 4 Moderate Risk (Score 3-4) - Pharmacological DVT Prophylaxis Ordered: apixaban (Eliquis)  Patient Centered Rounds: I performed bedside rounds with nursing staff today  Discussions with Specialists or Other Care Team Provider: h/o endo CM    Education and Discussions with Family / Patient: Updated  (friend) via phone  Time Spent for Care: 30 minutes  More than 50% of total time spent on counseling and coordination of care as described above  Current Length of Stay: 14 day(s)  Current Patient Status: Inpatient   Certification Statement: The patient will continue to require additional inpatient hospital stay due to need for blood transfusion  Discharge Plan: Anticipate discharge tomorrow to rehab facility  Code Status: Level 1 - Full Code    Subjective:   Pushpa Mckeon is seen and examined at bedside  No acute events overnight  Patient was found to have low hemoglobin levels in the morning and 1 unit was transfused  A gastric band was transitioned to Eliquis  Will monitor overnight for hemoglobin levels and hopefully discharge tomorrow  Discussed plan of care    All questions and concerns were answered and addressed  Objective:     Vitals:   Temp (24hrs), Av 3 °F (36 8 °C), Min:98 °F (36 7 °C), Max:98 9 °F (37 2 °C)    Temp:  [98 °F (36 7 °C)-98 9 °F (37 2 °C)] 98 °F (36 7 °C)  HR:  [101-141] 101  Resp:  [16-18] 16  BP: (111-131)/(56-77) 127/71  SpO2:  [95 %-98 %] 97 %  Body mass index is 23 63 kg/m²  Input and Output Summary (last 24 hours): Intake/Output Summary (Last 24 hours) at 2022 0653  Last data filed at 2022 5837  Gross per 24 hour   Intake 720 ml   Output 896 ml   Net -176 ml       Physical Exam:   Physical Exam   Vitals and nursing note reviewed  Constitutional:       Appearance: He is not ill-appearing       Comments: Thin frail   HENT:      Head: Normocephalic and atraumatic  Cardiovascular:      Rate and Rhythm: Regular rhythm  Tachycardia present       Pulses: Normal pulses       Heart sounds: Normal heart sounds  Pulmonary:      Effort: Pulmonary effort is normal       Breath sounds: Normal breath sounds  Abdominal:      General: Abdomen is flat  Bowel sounds are normal       Palpations: Abdomen is soft  Musculoskeletal:      Right lower leg: No edema       Left lower leg: No edema  Skin:     General: Skin is warm  Neurological:      Mental Status: He is alert  Mental status is at baseline      Additional Data:     Labs:  Results from last 7 days   Lab Units 22  0431 22  0235   WBC Thousand/uL  --  12 76*   HEMOGLOBIN g/dL 6 9* 6 9*   HEMATOCRIT % 21 1* 21 1*   PLATELETS Thousands/uL  --  197   NEUTROS PCT %  --  76*   LYMPHS PCT %  --  13*   MONOS PCT %  --  9   EOS PCT %  --  1     Results from last 7 days   Lab Units 22  0235   SODIUM mmol/L 134*   POTASSIUM mmol/L 4 0   CHLORIDE mmol/L 100   CO2 mmol/L 28   BUN mg/dL 27*   CREATININE mg/dL 0 96   ANION GAP mmol/L 6   CALCIUM mg/dL 7 8*   ALBUMIN g/dL 1 9*   TOTAL BILIRUBIN mg/dL 0 40   ALK PHOS U/L 90   ALT U/L 19   AST U/L 16   GLUCOSE RANDOM mg/dL 185*     Results from last 7 days   Lab Units 11/16/22  0324   INR  2 34*     Results from last 7 days   Lab Units 11/16/22  2156 11/16/22  1606 11/16/22  1056 11/16/22  0739 11/15/22  2105 11/15/22  1659 11/15/22  1121 11/15/22  0735 11/15/22  0125 11/14/22  2106 11/14/22  1613 11/14/22  1227   POC GLUCOSE mg/dl 258* 335* 137 99 111 155* 173* 191* 191* 445* 284* 235*               Lines/Drains:  Invasive Devices     Peripheral Intravenous Line  Duration           Peripheral IV 11/17/22 Distal;Left;Dorsal (posterior) Wrist <1 day    Peripheral IV 11/17/22 Right Antecubital <1 day                      Imaging: No pertinent imaging reviewed      Recent Cultures (last 7 days):         Last 24 Hours Medication List:   Current Facility-Administered Medications   Medication Dose Route Frequency Provider Last Rate   • acetaminophen  975 mg Oral Q8H [address] Selina Cash DO     • apixaban  5 mg Oral BID Scarlett Magallanes MD     • argatroban  0 1-3 mcg/kg/min Intravenous Titrated Scarlett Magallanes MD     • oxyCODONE  5 mg Oral Q3H PRN Gabrielle Drake DO      Or   • HYDROmorphone  0 2 mg Intravenous Q3H PRN Gabrielle Drake DO     • influenza vaccine  0 5 mL Intramuscular Prior to discharge Laly Velazco PA-C     • insulin glargine  15 Units Subcutaneous HS Scarlett Magallanes MD     • insulin lispro  1-5 Units Subcutaneous TID AC Laly Velazco PA-C     • insulin lispro  2-12 Units Subcutaneous HS Dayna Valdez PA-C     • insulin lispro  7 Units Subcutaneous TID With Meals Debra Agosto MD     • lidocaine  1 patch Topical Daily Vivek Munoz MD     • lidocaine  1 patch Topical Daily Vivek Munoz MD     • nicotine  1 patch Transdermal Daily Laly Velazco PA-C     • OLANZapine  5 mg Oral HS Vivek Munoz MD     • ondansetron  4 mg Intravenous Q6H PRN Laly Velazco PA-C     • oxyCODONE  5 mg Oral Q6H Kalyn Cassie Cash DO     • polyethylene glycol 17 g Oral Daily PRN Evita Alford DO     • senna  1 tablet Oral HS Kalyn Ray DO          Today, Patient Was Seen By: Binta Mcdaniel MD    **Please Note: This note may have been constructed using a voice recognition system  **

## 2022-11-17 NOTE — ASSESSMENT & PLAN NOTE
· Serum glucose on admission is 233   · No prior reported history of DM, however patient poor historian  · hemoglobin A1c 9 1  · started on insulin regimen, increased w improvement  · ISS  · DM diet  · Endo consulted  · Lantus 15 U qhs  · Humalog 7U tid  · DM education    F/u outpt

## 2022-11-17 NOTE — PLAN OF CARE
Problem: Nutrition/Hydration-ADULT  Goal: Nutrient/Hydration intake appropriate for improving, restoring or maintaining nutritional needs  Description: Monitor and assess patient's nutrition/hydration status for malnutrition  Collaborate with interdisciplinary team and initiate plan and interventions as ordered  Monitor patient's weight and dietary intake as ordered or per policy  Utilize nutrition screening tool and intervene as necessary  Determine patient's food preferences and provide high-protein, high-caloric foods as appropriate       INTERVENTIONS:  - Monitor oral intake, urinary output, labs, and treatment plans  - Assess nutrition and hydration status and recommend course of action  - Evaluate amount of meals eaten  - Assist patient with eating if necessary   - Allow adequate time for meals  - Recommend/ encourage appropriate diets, oral nutritional supplements, and vitamin/mineral supplements  - Order, calculate, and assess calorie counts as needed  - Recommend, monitor, and adjust tube feedings and TPN/PPN based on assessed needs  - Assess need for intravenous fluids  - Provide specific nutrition/hydration education as appropriate  - Include patient/family/caregiver in decisions related to nutrition  Outcome: Progressing     Problem: MOBILITY - ADULT  Goal: Maintain or return to baseline ADL function  Description: INTERVENTIONS:  -  Assess patient's ability to carry out ADLs; assess patient's baseline for ADL function and identify physical deficits which impact ability to perform ADLs (bathing, care of mouth/teeth, toileting, grooming, dressing, etc )  - Assess/evaluate cause of self-care deficits   - Assess range of motion  - Assess patient's mobility; develop plan if impaired  - Assess patient's need for assistive devices and provide as appropriate  - Encourage maximum independence but intervene and supervise when necessary  - Involve family in performance of ADLs  - Assess for home care needs following discharge   - Consider OT consult to assist with ADL evaluation and planning for discharge  - Provide patient education as appropriate  Outcome: Progressing  Goal: Maintains/Returns to pre admission functional level  Description: INTERVENTIONS:  - Perform BMAT or MOVE assessment daily    - Set and communicate daily mobility goal to care team and patient/family/caregiver  - Collaborate with rehabilitation services on mobility goals if consulted  - Perform Range of Motion 3 times a day  - Reposition patient every 2 hours    - Dangle patient 3 times a day  - Stand patient 3 times a day  - Ambulate patient 3 times a day  - Out of bed to chair 3 times a day   - Out of bed for meals 3 times a day  - Out of bed for toileting  - Record patient progress and toleration of activity level   Outcome: Progressing     Problem: Prexisting or High Potential for Compromised Skin Integrity  Goal: Skin integrity is maintained or improved  Description: INTERVENTIONS:  - Identify patients at risk for skin breakdown  - Assess and monitor skin integrity  - Assess and monitor nutrition and hydration status  - Monitor labs   - Assess for incontinence   - Turn and reposition patient  - Assist with mobility/ambulation  - Relieve pressure over bony prominences  - Avoid friction and shearing  - Provide appropriate hygiene as needed including keeping skin clean and dry  - Evaluate need for skin moisturizer/barrier cream  - Collaborate with interdisciplinary team   - Patient/family teaching  - Consider wound care consult   Outcome: Progressing     Problem: PAIN - ADULT  Goal: Verbalizes/displays adequate comfort level or baseline comfort level  Description: Interventions:  - Encourage patient to monitor pain and request assistance  - Assess pain using appropriate pain scale  - Administer analgesics based on type and severity of pain and evaluate response  - Implement non-pharmacological measures as appropriate and evaluate response  - Consider cultural and social influences on pain and pain management  - Notify physician/advanced practitioner if interventions unsuccessful or patient reports new pain  Outcome: Progressing     Problem: INFECTION - ADULT  Goal: Absence or prevention of progression during hospitalization  Description: INTERVENTIONS:  - Assess and monitor for signs and symptoms of infection  - Monitor lab/diagnostic results  - Monitor all insertion sites, i e  indwelling lines, tubes, and drains  - Monitor endotracheal if appropriate and nasal secretions for changes in amount and color  - [place] appropriate cooling/warming therapies per order  - Administer medications as ordered  - Instruct and encourage patient and family to use good hand hygiene technique  - Identify and instruct in appropriate isolation precautions for identified infection/condition  Outcome: Progressing  Goal: Absence of fever/infection during neutropenic period  Description: INTERVENTIONS:  - Monitor WBC    Outcome: Progressing     Problem: SAFETY ADULT  Goal: Maintain or return to baseline ADL function  Description: INTERVENTIONS:  -  Assess patient's ability to carry out ADLs; assess patient's baseline for ADL function and identify physical deficits which impact ability to perform ADLs (bathing, care of mouth/teeth, toileting, grooming, dressing, etc )  - Assess/evaluate cause of self-care deficits   - Assess range of motion  - Assess patient's mobility; develop plan if impaired  - Assess patient's need for assistive devices and provide as appropriate  - Encourage maximum independence but intervene and supervise when necessary  - Involve family in performance of ADLs  - Assess for home care needs following discharge   - Consider OT consult to assist with ADL evaluation and planning for discharge  - Provide patient education as appropriate  Outcome: Progressing  Goal: Maintains/Returns to pre admission functional level  Description: INTERVENTIONS:  - Perform BMAT or MOVE assessment daily    - Set and communicate daily mobility goal to care team and patient/family/caregiver  - Collaborate with rehabilitation services on mobility goals if consulted  - Perform Range of Motion 3 times a day  - Reposition patient every 2 hours    - Dangle patient 3 times a day  - Stand patient 3 times a day  - Ambulate patient 3 times a day  - Out of bed to chair 3 times a day   - Out of bed for meals 3 times a day  - Out of bed for toileting  - Record patient progress and toleration of activity level   Outcome: Progressing  Goal: Patient will remain free of falls  Description: INTERVENTIONS:  - Educate patient/family on patient safety including physical limitations  - Instruct patient to call for assistance with activity   - Consult OT/PT to assist with strengthening/mobility   - Keep Call bell within reach  - Keep bed low and locked with side rails adjusted as appropriate  - Keep care items and personal belongings within reach  - Initiate and maintain comfort rounds  - Make Fall Risk Sign visible to staff  - Offer Toileting every 2 Hours, in advance of need  - Initiate/Maintain alarm  - Obtain necessary fall risk management equipment:   - Apply yellow socks and bracelet for high fall risk patients  - Consider moving patient to room near nurses station  Outcome: Progressing     Problem: DISCHARGE PLANNING  Goal: Discharge to home or other facility with appropriate resources  Description: INTERVENTIONS:  - Identify barriers to discharge w/patient and caregiver  - Arrange for needed discharge resources and transportation as appropriate  - Identify discharge learning needs (meds, wound care, etc )  - Arrange for interpretive services to assist at discharge as needed  - Refer to Case Management Department for coordinating discharge planning if the patient needs post-hospital services based on physician/advanced practitioner order or complex needs related to functional status, cognitive ability, or social support system  Outcome: Progressing     Problem: Knowledge Deficit  Goal: Patient/family/caregiver demonstrates understanding of disease process, treatment plan, medications, and discharge instructions  Description: Complete learning assessment and assess knowledge base    Interventions:  - Provide teaching at level of understanding  - Provide teaching via preferred learning methods  Outcome: Progressing     Problem: Potential for Falls  Goal: Patient will remain free of falls  Description: INTERVENTIONS:  - Educate patient/family on patient safety including physical limitations  - Instruct patient to call for assistance with activity   - Consult OT/PT to assist with strengthening/mobility   - Keep Call bell within reach  - Keep bed low and locked with side rails adjusted as appropriate  - Keep care items and personal belongings within reach  - Initiate and maintain comfort rounds  - Make Fall Risk Sign visible to staff  - Offer Toileting every 2 Hours, in advance of need  - Initiate/Maintain alarm  - Obtain necessary fall risk management equipment:   - Apply yellow socks and bracelet for high fall risk patients  - Consider moving patient to room near nurses station  Outcome: Progressing     Problem: CARDIOVASCULAR - ADULT  Goal: Maintains optimal cardiac output and hemodynamic stability  Description: INTERVENTIONS:  - Monitor I/O, vital signs and rhythm  - Monitor for S/S and trends of decreased cardiac output  - Administer and titrate ordered vasoactive medications to optimize hemodynamic stability  - Assess quality of pulses, skin color and temperature  - Assess for signs of decreased coronary artery perfusion  - Instruct patient to report change in severity of symptoms  Outcome: Progressing  Goal: Absence of cardiac dysrhythmias or at baseline rhythm  Description: INTERVENTIONS:  - Continuous cardiac monitoring, vital signs, obtain 12 lead EKG if ordered  - Administer antiarrhythmic and heart rate control medications as ordered  - Monitor electrolytes and administer replacement therapy as ordered  Outcome: Progressing     Problem: RESPIRATORY - ADULT  Goal: Achieves optimal ventilation and oxygenation  Description: INTERVENTIONS:  - Assess for changes in respiratory status  - Assess for changes in mentation and behavior  - Position to facilitate oxygenation and minimize respiratory effort  - Oxygen administered by appropriate delivery if ordered  - Initiate smoking cessation education as indicated  - Encourage broncho-pulmonary hygiene including cough, deep breathe, Incentive Spirometry  - Assess the need for suctioning and aspirate as needed  - Assess and instruct to report SOB or any respiratory difficulty  - Respiratory Therapy support as indicated  Outcome: Progressing     Problem: METABOLIC, FLUID AND ELECTROLYTES - ADULT  Goal: Electrolytes maintained within normal limits  Description: INTERVENTIONS:  - Monitor labs and assess patient for signs and symptoms of electrolyte imbalances  - Administer electrolyte replacement as ordered  - Monitor response to electrolyte replacements, including repeat lab results as appropriate  - Instruct patient on fluid and nutrition as appropriate  Outcome: Progressing  Goal: Fluid balance maintained  Description: INTERVENTIONS:  - Monitor labs   - Monitor I/O and WT  - Instruct patient on fluid and nutrition as appropriate  - Assess for signs & symptoms of volume excess or deficit  Outcome: Progressing  Goal: Glucose maintained within target range  Description: INTERVENTIONS:  - Monitor Blood Glucose as ordered  - Assess for signs and symptoms of hyperglycemia and hypoglycemia  - Administer ordered medications to maintain glucose within target range  - Assess nutritional intake and initiate nutrition service referral as needed  Outcome: Progressing     Problem: SKIN/TISSUE INTEGRITY - ADULT  Goal: Skin Integrity remains intact(Skin Breakdown Prevention)  Description: Assess:  -Perform Ashish assessment  -Clean and moisturize skin   -Inspect skin when repositioning, toileting, and assisting with ADLS  -Assess under medical devices   -Assess extremities for adequate circulation and sensation     Bed Management:  -Have minimal linens on bed & keep smooth, unwrinkled  -Change linens as needed when moist or perspiring  -Avoid sitting or lying in one position for more than 2 hours while in bed  -Keep HOB at 35 degrees     Toileting:  -Offer bedside commode  -Assess for incontinence  -Use incontinent care products after each incontinent episode     Activity:  -Mobilize patient 3 times a day  -Encourage activity and walks on unit  -Encourage or provide ROM exercises   -Turn and reposition patient every 2 Hours  -Use appropriate equipment to lift or move patient in bed  -Instruct/ Assist with weight shifting every 2 when out of bed in chair  -Consider limitation of chair time 2 hour intervals    Skin Care:  -Avoid use of baby powder, tape, friction and shearing, hot water or constrictive clothing  -Relieve pressure over bony prominences   -Do not massage red bony areas    Next Steps:  -Teach patient strategies to minimize risks    -Consider consults to  interdisciplinary teams   Outcome: Progressing     Problem: HEMATOLOGIC - ADULT  Goal: Maintains hematologic stability  Description: INTERVENTIONS  - Assess for signs and symptoms of bleeding or hemorrhage  - Monitor labs  - Administer supportive blood products/factors as ordered and appropriate  Outcome: Progressing     Problem: MUSCULOSKELETAL - ADULT  Goal: Maintain or return mobility to safest level of function  Description: INTERVENTIONS:  - Assess patient's ability to carry out ADLs; assess patient's baseline for ADL function and identify physical deficits which impact ability to perform ADLs (bathing, care of mouth/teeth, toileting, grooming, dressing, etc )  - Assess/evaluate cause of self-care deficits - Assess range of motion  - Assess patient's mobility  - Assess patient's need for assistive devices and provide as appropriate  - Encourage maximum independence but intervene and supervise when necessary  - Involve family in performance of ADLs  - Assess for home care needs following discharge   - Consider OT consult to assist with ADL evaluation and planning for discharge  - Provide patient education as appropriate  Outcome: Progressing

## 2022-11-18 LAB
ABO GROUP BLD BPU: NORMAL
ALBUMIN SERPL BCP-MCNC: 1.8 G/DL (ref 3.5–5)
ALP SERPL-CCNC: 94 U/L (ref 46–116)
ALT SERPL W P-5'-P-CCNC: 24 U/L (ref 12–78)
ANION GAP SERPL CALCULATED.3IONS-SCNC: 5 MMOL/L (ref 4–13)
AST SERPL W P-5'-P-CCNC: 23 U/L (ref 5–45)
BASOPHILS # BLD AUTO: 0.03 THOUSANDS/ÂΜL (ref 0–0.1)
BASOPHILS NFR BLD AUTO: 0 % (ref 0–1)
BILIRUB SERPL-MCNC: 0.4 MG/DL (ref 0.2–1)
BPU ID: NORMAL
BUN SERPL-MCNC: 22 MG/DL (ref 5–25)
CALCIUM ALBUM COR SERPL-MCNC: 9.8 MG/DL (ref 8.3–10.1)
CALCIUM SERPL-MCNC: 8 MG/DL (ref 8.3–10.1)
CHLORIDE SERPL-SCNC: 102 MMOL/L (ref 96–108)
CO2 SERPL-SCNC: 28 MMOL/L (ref 21–32)
CREAT SERPL-MCNC: 0.84 MG/DL (ref 0.6–1.3)
CROSSMATCH: NORMAL
EOSINOPHIL # BLD AUTO: 0.08 THOUSAND/ÂΜL (ref 0–0.61)
EOSINOPHIL NFR BLD AUTO: 1 % (ref 0–6)
ERYTHROCYTE [DISTWIDTH] IN BLOOD BY AUTOMATED COUNT: 16.4 % (ref 11.6–15.1)
FLUAV RNA RESP QL NAA+PROBE: NEGATIVE
FLUBV RNA RESP QL NAA+PROBE: NEGATIVE
GFR SERPL CREATININE-BSD FRML MDRD: 93 ML/MIN/1.73SQ M
GLUCOSE SERPL-MCNC: 127 MG/DL (ref 65–140)
GLUCOSE SERPL-MCNC: 137 MG/DL (ref 65–140)
GLUCOSE SERPL-MCNC: 183 MG/DL (ref 65–140)
HCT VFR BLD AUTO: 25.5 % (ref 36.5–49.3)
HGB BLD-MCNC: 8.5 G/DL (ref 12–17)
IMM GRANULOCYTES # BLD AUTO: 0.15 THOUSAND/UL (ref 0–0.2)
IMM GRANULOCYTES NFR BLD AUTO: 1 % (ref 0–2)
LYMPHOCYTES # BLD AUTO: 1.59 THOUSANDS/ÂΜL (ref 0.6–4.47)
LYMPHOCYTES NFR BLD AUTO: 12 % (ref 14–44)
MAGNESIUM SERPL-MCNC: 2 MG/DL (ref 1.6–2.6)
MCH RBC QN AUTO: 31.6 PG (ref 26.8–34.3)
MCHC RBC AUTO-ENTMCNC: 33.3 G/DL (ref 31.4–37.4)
MCV RBC AUTO: 95 FL (ref 82–98)
MONOCYTES # BLD AUTO: 1.2 THOUSAND/ÂΜL (ref 0.17–1.22)
MONOCYTES NFR BLD AUTO: 9 % (ref 4–12)
NEUTROPHILS # BLD AUTO: 10.35 THOUSANDS/ÂΜL (ref 1.85–7.62)
NEUTS SEG NFR BLD AUTO: 77 % (ref 43–75)
NRBC BLD AUTO-RTO: 0 /100 WBCS
PLATELET # BLD AUTO: 199 THOUSANDS/UL (ref 149–390)
PMV BLD AUTO: 9.5 FL (ref 8.9–12.7)
POTASSIUM SERPL-SCNC: 4.2 MMOL/L (ref 3.5–5.3)
PROT SERPL-MCNC: 5.8 G/DL (ref 6.4–8.4)
RBC # BLD AUTO: 2.69 MILLION/UL (ref 3.88–5.62)
RSV RNA RESP QL NAA+PROBE: NEGATIVE
SARS-COV-2 RNA RESP QL NAA+PROBE: NEGATIVE
SODIUM SERPL-SCNC: 135 MMOL/L (ref 135–147)
SRA .2 IU/ML UFH SER-ACNC: 111 % (ref 0–20)
SRA 100IU/ML UFH SER-ACNC: <1 % (ref 0–20)
SRA UFH SER-IMP: ABNORMAL
UNIT DISPENSE STATUS: NORMAL
UNIT PRODUCT CODE: NORMAL
UNIT PRODUCT VOLUME: 350 ML
UNIT RH: NORMAL
WBC # BLD AUTO: 13.4 THOUSAND/UL (ref 4.31–10.16)

## 2022-11-18 RX ORDER — OLANZAPINE 5 MG/1
5 TABLET ORAL
Qty: 30 TABLET | Refills: 0
Start: 2022-11-18

## 2022-11-18 RX ORDER — LIDOCAINE 50 MG/G
1 PATCH TOPICAL DAILY
Qty: 30 PATCH | Refills: 0
Start: 2022-11-18

## 2022-11-18 RX ORDER — INSULIN GLARGINE 100 [IU]/ML
15 INJECTION, SOLUTION SUBCUTANEOUS
Qty: 15 ML | Refills: 0
Start: 2022-11-18

## 2022-11-18 RX ORDER — ACETAMINOPHEN 325 MG/1
975 TABLET ORAL EVERY 8 HOURS SCHEDULED
Qty: 270 TABLET | Refills: 0
Start: 2022-11-18 | End: 2022-12-18

## 2022-11-18 RX ORDER — NICOTINE 21 MG/24HR
1 PATCH, TRANSDERMAL 24 HOURS TRANSDERMAL DAILY
Qty: 28 PATCH | Refills: 0
Start: 2022-11-18

## 2022-11-18 RX ORDER — SENNOSIDES 8.6 MG
8.6 TABLET ORAL
Qty: 30 TABLET | Refills: 0
Start: 2022-11-18 | End: 2022-12-18

## 2022-11-18 RX ORDER — INSULIN LISPRO 100 [IU]/ML
7 INJECTION, SOLUTION INTRAVENOUS; SUBCUTANEOUS
Qty: 15 ML | Refills: 0
Start: 2022-11-18

## 2022-11-18 RX ORDER — OXYCODONE HYDROCHLORIDE 5 MG/1
5 TABLET ORAL
Qty: 5 TABLET | Refills: 0 | Status: SHIPPED | OUTPATIENT
Start: 2022-11-18 | End: 2022-11-28

## 2022-11-18 RX ORDER — POLYETHYLENE GLYCOL 3350 17 G/17G
17 POWDER, FOR SOLUTION ORAL DAILY PRN
Qty: 30 EACH | Refills: 0
Start: 2022-11-18

## 2022-11-18 RX ADMIN — NICOTINE 1 PATCH: 21 PATCH, EXTENDED RELEASE TRANSDERMAL at 09:15

## 2022-11-18 RX ADMIN — OXYCODONE HYDROCHLORIDE 5 MG: 5 TABLET ORAL at 04:17

## 2022-11-18 RX ADMIN — INSULIN LISPRO 7 UNITS: 100 INJECTION, SOLUTION INTRAVENOUS; SUBCUTANEOUS at 09:15

## 2022-11-18 RX ADMIN — ACETAMINOPHEN 975 MG: 325 TABLET, FILM COATED ORAL at 14:28

## 2022-11-18 RX ADMIN — ACETAMINOPHEN 975 MG: 325 TABLET, FILM COATED ORAL at 06:32

## 2022-11-18 RX ADMIN — OXYCODONE HYDROCHLORIDE 5 MG: 5 TABLET ORAL at 09:14

## 2022-11-18 RX ADMIN — APIXABAN 5 MG: 5 TABLET, FILM COATED ORAL at 09:14

## 2022-11-18 RX ADMIN — INSULIN LISPRO 1 UNITS: 100 INJECTION, SOLUTION INTRAVENOUS; SUBCUTANEOUS at 12:36

## 2022-11-18 RX ADMIN — OXYCODONE HYDROCHLORIDE 5 MG: 5 TABLET ORAL at 14:28

## 2022-11-18 RX ADMIN — INSULIN LISPRO 7 UNITS: 100 INJECTION, SOLUTION INTRAVENOUS; SUBCUTANEOUS at 12:36

## 2022-11-18 NOTE — INCIDENTAL FINDINGS
The following findings require follow up:  Radiographic finding   Finding:  CT abdomen pelvis with contrast: 1   2 5 x 2 2 x 2 2 cm mass in the left lower lobe concerning for lung cancer , 2   Lytic lesions within the left 6th rib and pelvis bones compatible with osseous metastasis  , 3   2   Small left pleural effusion  , 4   3   Small anterior pericardial effusion  , 5   Small wedge-shaped hypodensities within bilateral kidneys may reflect scarring versus small renal infarcts , 6   Other findings as above , I personally discussed this study with Dr Hal Johnson on 11/3/2022 at 1:54 AM , Workstation performed: [id number]  • US right upper quadrant: Distended gallbladder with sludge, top normal wall and trace pericholecystic fluid though no wall edema or stones and Emerson's sign could not be assessed due to medication  These findings may represent acute cholecystitis in the appropriate clinical , setting though HIDA scan can be performed for confirmation of clinically indicated  , The study was marked in [facility] for immediate notification  , Workstation performed: [id number]  • MRI brain w wo contrast: 1  Diffuse bilateral foci and regions of frontotemporoparietal MCA distribution acute to subacute ischemia , 2  Focus of enhancement in the left frontal precentral gyrus could be ischemic-related rather than metastatic disease   Short-term follow-up with contrast-enhanced brain MRI recommended in 3 months     Follow up required: yes   Follow up should be done within 1 month(s)    Please notify the following clinician to assist with the follow up:   Dr Mounika Cortez

## 2022-11-18 NOTE — ASSESSMENT & PLAN NOTE
· Presents with generalized weakness, fatigue, and weight loss  · CT a/P: " 2 5 x 2 2 x 2 2 cm mass in the left lower lobe concerning for lung cancer  Lytic lesions within the left 6th rib and pelvis bones compatible with osseous metastasis "  · Longstanding smoking history  · Will ask pulm to evaluate  · Heme-Onc following       · patient will need lifelong anticoagulation d/t stage IV malignancy-> transitioned to Eliquis  · IR tissue biopsy performed - 11/8- positive for metastatic lung adenocarcinoma  · Palliative care following  · Guardian requesting treatment with chemo and possible immunotherapy    F/u with h/o and palliative outpt

## 2022-11-18 NOTE — NURSING NOTE
Patient repositioned for lunch today  Patient repositioning self and squirming around bed throughout shift  Nursing keeps helping patient get more comfortable looking in bed but patient keeps moving  Nurse got report that patient will have a bowel movement and scratch/touches his bottom before being cleaned  Patient claims he needs to make sure its all out  Will continue to monitor and keep patient comfortable

## 2022-11-18 NOTE — DISCHARGE SUMMARY
[facility]  Discharge- Darius Roberto 1960, 58 y o  male MRN: [id number]  Unit/Bed#: -[facility]hell Encounter: [id number]  Primary Care Provider: No primary care provider on file  Date and time admitted to hospital: 11/2/2022  9:13 PM    * Suspected Primary malignant neoplasm of left lung metastatic to other site [facility])  Assessment & Plan  · Presents with generalized weakness, fatigue, and weight loss  · CT a/P: " 2 5 x 2 2 x 2 2 cm mass in the left lower lobe concerning for lung cancer  Lytic lesions within the left 6th rib and pelvis bones compatible with osseous metastasis "  · Longstanding smoking history  · Will ask pulm to evaluate  · Heme-Onc following       · patient will need lifelong anticoagulation d/t stage IV malignancy-> transitioned to Eliquis  · IR tissue biopsy performed - 11/8- positive for metastatic lung adenocarcinoma  · Palliative care following  · Guardian requesting treatment with chemo and possible immunotherapy    F/u with h/o and palliative outpt    Type 2 diabetes mellitus without complication, without long-term current use of insulin [facility])  Assessment & Plan  Lab Results   Component Value Date    HGBA1C 9 1 (H) 11/02/2022       Recent Labs     11/17/22  1146 11/17/22  1637 11/17/22  2233 11/18/22  0747   POCGLU 195* 119 240* 137       Blood Sugar Average: Last 72 hrs:  (P) 184     See mgx of elevated glucose    Thrombocytopenia (HCC)  Assessment & Plan  D/w hematology on call  continue argatroban Gtt-> transitioned to Eliquis  Monitor platelets  improving    Severe protein-calorie malnutrition (HCC)  Assessment & Plan  Malnutrition Findings:   Adult Malnutrition type: Chronic illness  Adult Degree of Malnutrition: Other severe protein calorie malnutrition  Malnutrition Characteristics: Fat loss, Muscle loss                  360 Statement: Pt presents with severe protein calorie malnutrition as evidenced by orbital hollowing, temporal scooping and prominent clavicle bone protrusion  BMI Findings: Body mass index is 23 63 kg/m²  Anemia of chronic disease  Assessment & Plan  Severe anemia, progressive   Likely related to malignancy  No sx of bleeding  Transfused 1 unit   Transfused 1U 11/17       Pedal edema  Assessment & Plan  · B/l pedal edema (R>L) for a couple of days   · Due to probable newly diagnosed metastatic cancer on CT scan, which demonstrated DVTs     Elevated glucose  Assessment & Plan  · Serum glucose on admission is 233   · No prior reported history of DM, however patient poor historian  · hemoglobin A1c 9 1  · started on insulin regimen, increased w improvement  · ISS  · DM diet  · Endo consulted  · Lantus 15 U qhs  · Humalog 7U tid  · DM education    F/u outpt    Schizophrenia ([facility], [place] [address] )  Assessment & Plan  · Self-reported history of schizophrenia   · Reportedly treated with clonazepam 100 mg HS, however patient reports non adherence with this   · Presented to the ED with acute psychosis requiring Haldol 5 mg IM x1  · Neuro- psychiatry was consulted because of question about patient's capacity  Patient did not seem to have insight into his condition  He does not have capacity to make medical decisions at this time  Abnormal CT scan, gallbladder  Assessment & Plan  · Distended gallbladder on CT scan   · Obtained right upper quadrant ultrasound patient also with elevated T bili and alk-phos  · Completed zosyn  No acute surgical needs by general surgery  No suspicion for cholecystitis per surg    F/u with surgery outpt    SIRS (systemic inflammatory response syndrome) (HCC)  Assessment & Plan  · Sirs criteria met on admission:  Tachycardia and leukocytosis at 17 36 K without bandemia   · No severe sepsis criteria met   · Suspect reactive from suspected metastatic lung cancer as seen on CT scan   · UA without infection   · Procalcitonin elevated at 11 67-> 3    · ?   Elevation secondary to infection or possible small cell lung cancer  · CT scan does show distended gallbladder and with elevated T bili and alk-phos, received IV Zosyn to cover for possible infection  Surgery suggests no acute surgical intervention  · Blood cultures x2, neg  · ID s/o  · Fever curve improving  Observing off abx  Fevers felt 2/2 malignancy, dvt etc       Hypokalemia-resolved as of 11/14/2022  Assessment & Plan  Replete aggressively  resolved    Hyponatremia-resolved as of 11/15/2022  Assessment & Plan  · resolved    Hypercalcemia-resolved as of 11/14/2022  Assessment & Plan  · PTH low, hypercalcemia of malignancy is etiology   · apprec nephrology assistance  · S/p IVF, lasix, zometa  · resolved    Medical Problems     Resolved Problems  Date Reviewed: 11/13/2022          Resolved    Hypercalcemia 11/14/2022     Resolved by  Elvie Pena MD    Hyponatremia 11/15/2022     Resolved by  Elvie Pena MD    Hypokalemia 11/14/2022     Resolved by  Elvie Pena MD        Discharging Physician / Practitioner: Elvie Pena MD  PCP: No primary care provider on file  Admission Date:   Admission Orders (From admission, onward)     Ordered        11/03/22 0202  INPATIENT ADMISSION  Once                      Discharge Date: 11/18/22    Consultations During Hospital Stay:  · Hematology-Oncology  · [address]  · Palliative  · Surgery  · Neuropsych  · Nephrology  · Pulmonology  · Id  · Endocrinology  · Case management  · PT  · OT  ·     Procedures Performed:   MRI brain w wo contrast   Final Result         1  Diffuse bilateral foci and regions of frontotemporoparietal MCA distribution acute to subacute ischemia  2  Focus of enhancement in the left frontal precentral gyrus could be ischemic-related rather than metastatic disease  Short-term follow-up with contrast-enhanced brain MRI recommended in 3 months  Findings were marked as "immediate"in Epic and will now be related to the ordering physician or covering clinical team by the radiology liaison  Workstation performed: [id number]         IR IN-Patient Thoracentesis   Final Result   Impression:   Small left pleural fluid improved from prior CT not enough for safe thoracentesis  Workstation performed: [id number]         IR biopsy bone   Final Result   Impression: Successful percutaneous left posterior iliac lytic bone lesion aspiration and core biopsy, yielding a specimen adequate for evaluation   A full pathology report will follow  Workstation performed: [id number]         MRI lumbar spine w wo contrast   Final Result   1  Scattered enhancing osseous metastases throughout the lumbar spine most notable at the T12 and L2-L4 vertebral bodies  Bilateral iliac bone lesions, partially imaged  2  There is no pathologic compression fracture  3  Moderate L5-S1 disc bulge with moderate bilateral subarticular/lateral recess narrowing  Moderate to severe bilateral L5-S1 neural foraminal narrowing with encroachment of the exiting L5 nerve roots  Workstation performed: [id number]         CTA chest pe study   Final Result      A few small subsegmental emboli in the right middle lobe, right lower lobe, and inferior lingula  3 6 cm thick walled cavitary right upper lobe tumor and 2 8 cm necrotic left lower lobe tumor  Ill-defined infiltration of the mediastinal fat likely due to tumor with enlarged left hilar node, likely metastatic  Small effusions  Metastatic disease to bilateral ribs and T6 and T8 with severe compression deformities  Given mild retropulsion of bone into the spinal canal at the level of T8, recommend further evaluation with MRI to evaluate for cord compression  Persistent gallbladder distention, incompletely imaged  I notified Elaina Martel on 11/3/2022 at 8:51 PM by secure text and she responded immediately                        Workstation performed: [id number]         US right upper quadrant   Final Result      Distended gallbladder with sludge, top normal wall and trace pericholecystic fluid though no wall edema or stones and Emerson's sign could not be assessed due to medication  These findings may represent acute cholecystitis in the appropriate clinical    setting though HIDA scan can be performed for confirmation of clinically indicated  The study was marked in [facility] for immediate notification  Workstation performed: [id number]         VAS lower limb venous duplex study, complete bilateral   Final Result      CT abdomen pelvis with contrast   Final Result      1   2 5 x 2 2 x 2 2 cm mass in the left lower lobe concerning for lung cancer  2   Lytic lesions within the left 6th rib and pelvis bones compatible with osseous metastasis  3   2   Small left pleural effusion  4   3   Small anterior pericardial effusion  5   Small wedge-shaped hypodensities within bilateral kidneys may reflect scarring versus small renal infarcts  6   Other findings as above  I personally discussed this study with Dr Nataliya Hodges on 11/3/2022 at 1:54 AM          Workstation performed: [id number]         XR chest portable   ED Interpretation   No infiltrate, effusion or pneumothorax      Final Result      Right upper lobe cavitary mass, left lower lobe mass, and left rib metastases, some which are visible on the abdomen CT  Findings on the abdomen CT were reported to the emergency physician by Dr Rudy Smith  Workstation performed: [id number]           Echo:Left Ventricle: Left ventricular cavity size is normal  Wall thickness is normal  The left ventricular ejection fraction is 65% by visual estimation  Wall motion is normal  Diastolic function is normal   •  Pericardium: There is a small pericardial effusion along the right atrial free wall  There is a small likelihood of cardiac tamponade  The evidence for tamponade includes: right atrial inversion    ·     Significant Findings / Test Results:   · See above    Incidental Findings:   ·    · I reviewed the above mentioned incidental findings with the patient and/or family and they expressed understanding  Test Results Pending at Discharge (will require follow up):   · none     Outpatient Tests Requested:  · none    Complications: Thrombocytopenia secondary to hit    Reason for Admission:  Hypercalcemia secondary to metastatic disease    Hospital Course:   Kal Robledo is a 58 y o  male patient who originally presented to the hospital on 11/2/2022 due to generalized weakness and fatigue  Patient had a prolonged hospital course  In the ED he was found to have hypercalcemia and was said to be most likely secondary to adenocarcinoma of the lung  Oncology was consulted and Dopplers were done  Dopplers were shown to reveal DVT and was placed on heparin drip  Due to schizophrenia psych was consulted medications were adjusted and due to lack of insight neuropsych was consulted and deemed that he had lacked capacity  Luci Leonardo was announced as guardian  Biopsies were taken and confirmed primary adenocarcinoma of the lung with metastases  Palliative Care was consulted  Electrolyte abnormalities were corrected with assistance of Nephrology  Hemoglobin A1c was elevated and endocrinology was consulted for newly diagnosed diabetes  He had some fever spikes where id was consulted blood cultures were done and were negative  It was found that his fever spikes and anemia were attributed his malignancy  During his stay he required 2 units of PRBCs  PT OT evaluated the patient and recommended rehab  [facility]  He has been medically cleared for discharge  He is to follow-up with surgery, endocrinology, psychiatry, Oncology, and palliative care  He is also to follow-up with his PCP  He will be discharged on diabetic regimen and Eliquis  Please see above list of diagnoses and related plan for additional information  Condition at Discharge: stable    Discharge Day Visit / Exam:   Subjective:  Jimy Rodriguez was seen and examined at bedside  No acute events overnight  Patient has poor insight however discussed plan of care all questions and concerns were answered and addressed  Vitals: Blood Pressure: 106/68 (11/18/22 0749)  Pulse: (!) 107 (11/18/22 0749)  Temperature: 98 9 °F (37 2 °C) (11/18/22 0749)  Temp Source: Axillary (11/17/22 2239)  Respirations: 12 (11/17/22 2239)  Height: 5' 9" (175 3 cm) (11/04/22 1030)  Weight - Scale: 72 6 kg (160 lb) (11/04/22 1030)  SpO2: 96 % (11/18/22 0749)  Exam:   Physical Exam   Vitals and nursing note reviewed  Constitutional:       Appearance: He is not ill-appearing       Comments: Thin frail   HENT:      Head: Normocephalic and atraumatic  Cardiovascular:      Rate and Rhythm: Regular rhythm  Tachycardia present       Pulses: Normal pulses       Heart sounds: Normal heart sounds  Pulmonary:      Effort: Pulmonary effort is normal       Breath sounds: Normal breath sounds  Abdominal:      General: Abdomen is flat  Bowel sounds are normal       Palpations: Abdomen is soft  Musculoskeletal:      Right lower leg: No edema       Left lower leg: No edema  Skin:     General: Skin is warm  Neurological:      Mental Status: He is alert  Mental status is at baseline    Discussion with Family: Updated  (friend) via phone  Discharge instructions/Information to patient and family:   See after visit summary for information provided to patient and family  Provisions for Follow-Up Care:  See after visit summary for information related to follow-up care and any pertinent home health orders  Disposition:   Acute Rehab at [address] [place] [address] Readmission: no     Discharge Statement:  I spent 45 minutes discharging the patient  This time was spent on the day of discharge  I had direct contact with the patient on the day of discharge   Greater than 50% of the total time was spent examining patient, answering all patient questions, arranging and discussing plan of care with patient as well as directly providing post-discharge instructions  Additional time then spent on discharge activities  Discharge Medications:  See after visit summary for reconciled discharge medications provided to patient and/or family        **Please Note: This note may have been constructed using a voice recognition system**

## 2022-11-18 NOTE — PLAN OF CARE
Problem: Nutrition/Hydration-ADULT  Goal: Nutrient/Hydration intake appropriate for improving, restoring or maintaining nutritional needs  Description: Monitor and assess patient's nutrition/hydration status for malnutrition  Collaborate with interdisciplinary team and initiate plan and interventions as ordered  Monitor patient's weight and dietary intake as ordered or per policy  Utilize nutrition screening tool and intervene as necessary  Determine patient's food preferences and provide high-protein, high-caloric foods as appropriate       INTERVENTIONS:  - Monitor oral intake, urinary output, labs, and treatment plans  - Assess nutrition and hydration status and recommend course of action  - Evaluate amount of meals eaten  - Assist patient with eating if necessary   - Allow adequate time for meals  - Recommend/ encourage appropriate diets, oral nutritional supplements, and vitamin/mineral supplements  - Order, calculate, and assess calorie counts as needed  - Recommend, monitor, and adjust tube feedings and TPN/PPN based on assessed needs  - Assess need for intravenous fluids  - Provide specific nutrition/hydration education as appropriate  - Include patient/family/caregiver in decisions related to nutrition  Outcome: Progressing     Problem: MOBILITY - ADULT  Goal: Maintain or return to baseline ADL function  Description: INTERVENTIONS:  -  Assess patient's ability to carry out ADLs; assess patient's baseline for ADL function and identify physical deficits which impact ability to perform ADLs (bathing, care of mouth/teeth, toileting, grooming, dressing, etc )  - Assess/evaluate cause of self-care deficits   - Assess range of motion  - Assess patient's mobility; develop plan if impaired  - Assess patient's need for assistive devices and provide as appropriate  - Encourage maximum independence but intervene and supervise when necessary  - Involve family in performance of ADLs  - Assess for home care needs following discharge   - Consider OT consult to assist with ADL evaluation and planning for discharge  - Provide patient education as appropriate  Outcome: Progressing  Goal: Maintains/Returns to pre admission functional level  Description: INTERVENTIONS:  - Perform BMAT or MOVE assessment daily    - Set and communicate daily mobility goal to care team and patient/family/caregiver  - Collaborate with rehabilitation services on mobility goals if consulted  - Perform Range of Motion  times a day  - Reposition patient every  hours    - Dangle patient  times a day  - Stand patient  times a day  - Ambulate patient  times a day  - Out of bed to chair  times a day   - Out of bed for meals  times a day  - Out of bed for toileting  - Record patient progress and toleration of activity level   Outcome: Progressing     Problem: Prexisting or High Potential for Compromised Skin Integrity  Goal: Skin integrity is maintained or improved  Description: INTERVENTIONS:  - Identify patients at risk for skin breakdown  - Assess and monitor skin integrity  - Assess and monitor nutrition and hydration status  - Monitor labs   - Assess for incontinence   - Turn and reposition patient  - Assist with mobility/ambulation  - Relieve pressure over bony prominences  - Avoid friction and shearing  - Provide appropriate hygiene as needed including keeping skin clean and dry  - Evaluate need for skin moisturizer/barrier cream  - Collaborate with interdisciplinary team   - Patient/family teaching  - Consider wound care consult   Outcome: Progressing     Problem: PAIN - ADULT  Goal: Verbalizes/displays adequate comfort level or baseline comfort level  Description: Interventions:  - Encourage patient to monitor pain and request assistance  - Assess pain using appropriate pain scale  - Administer analgesics based on type and severity of pain and evaluate response  - Implement non-pharmacological measures as appropriate and evaluate response  - Consider cultural and social influences on pain and pain management  - Notify physician/advanced practitioner if interventions unsuccessful or patient reports new pain  Outcome: Progressing     Problem: INFECTION - ADULT  Goal: Absence or prevention of progression during hospitalization  Description: INTERVENTIONS:  - Assess and monitor for signs and symptoms of infection  - Monitor lab/diagnostic results  - Monitor all insertion sites, i e  indwelling lines, tubes, and drains  - Monitor endotracheal if appropriate and nasal secretions for changes in amount and color  - [place] appropriate cooling/warming therapies per order  - Administer medications as ordered  - Instruct and encourage patient and family to use good hand hygiene technique  - Identify and instruct in appropriate isolation precautions for identified infection/condition  Outcome: Progressing  Goal: Absence of fever/infection during neutropenic period  Description: INTERVENTIONS:  - Monitor WBC    Outcome: Progressing     Problem: SAFETY ADULT  Goal: Maintain or return to baseline ADL function  Description: INTERVENTIONS:  -  Assess patient's ability to carry out ADLs; assess patient's baseline for ADL function and identify physical deficits which impact ability to perform ADLs (bathing, care of mouth/teeth, toileting, grooming, dressing, etc )  - Assess/evaluate cause of self-care deficits   - Assess range of motion  - Assess patient's mobility; develop plan if impaired  - Assess patient's need for assistive devices and provide as appropriate  - Encourage maximum independence but intervene and supervise when necessary  - Involve family in performance of ADLs  - Assess for home care needs following discharge   - Consider OT consult to assist with ADL evaluation and planning for discharge  - Provide patient education as appropriate  Outcome: Progressing  Goal: Maintains/Returns to pre admission functional level  Description: INTERVENTIONS:  - Perform BMAT or MOVE assessment daily    - Set and communicate daily mobility goal to care team and patient/family/caregiver  - Collaborate with rehabilitation services on mobility goals if consulted  - Perform Range of Motion  times a day  - Reposition patient every  hours    - Dangle patient  times a day  - Stand patient  times a day  - Ambulate patient  times a day  - Out of bed to chair  times a day   - Out of bed for meals  times a day  - Out of bed for toileting  - Record patient progress and toleration of activity level   Outcome: Progressing  Goal: Patient will remain free of falls  Description: INTERVENTIONS:  - Educate patient/family on patient safety including physical limitations  - Instruct patient to call for assistance with activity   - Consult OT/PT to assist with strengthening/mobility   - Keep Call bell within reach  - Keep bed low and locked with side rails adjusted as appropriate  - Keep care items and personal belongings within reach  - Initiate and maintain comfort rounds  - Make Fall Risk Sign visible to staff  - Offer Toileting every  Hours, in advance of need  - Initiate/Maintain alarm  - Obtain necessary fall risk management equipment:   - Apply yellow socks and bracelet for high fall risk patients  - Consider moving patient to room near nurses station  Outcome: Progressing     Problem: DISCHARGE PLANNING  Goal: Discharge to home or other facility with appropriate resources  Description: INTERVENTIONS:  - Identify barriers to discharge w/patient and caregiver  - Arrange for needed discharge resources and transportation as appropriate  - Identify discharge learning needs (meds, wound care, etc )  - Arrange for interpretive services to assist at discharge as needed  - Refer to Case Management Department for coordinating discharge planning if the patient needs post-hospital services based on physician/advanced practitioner order or complex needs related to functional status, cognitive ability, or social support system  Outcome: Progressing     Problem: Knowledge Deficit  Goal: Patient/family/caregiver demonstrates understanding of disease process, treatment plan, medications, and discharge instructions  Description: Complete learning assessment and assess knowledge base    Interventions:  - Provide teaching at level of understanding  - Provide teaching via preferred learning methods  Outcome: Progressing     Problem: Potential for Falls  Goal: Patient will remain free of falls  Description: INTERVENTIONS:  - Educate patient/family on patient safety including physical limitations  - Instruct patient to call for assistance with activity   - Consult OT/PT to assist with strengthening/mobility   - Keep Call bell within reach  - Keep bed low and locked with side rails adjusted as appropriate  - Keep care items and personal belongings within reach  - Initiate and maintain comfort rounds  - Make Fall Risk Sign visible to staff  - Offer Toileting every  Hours, in advance of need  - Initiate/Maintain alarm  - Obtain necessary fall risk management equipment:   - Apply yellow socks and bracelet for high fall risk patients  - Consider moving patient to room near nurses station  Outcome: Progressing     Problem: CARDIOVASCULAR - ADULT  Goal: Maintains optimal cardiac output and hemodynamic stability  Description: INTERVENTIONS:  - Monitor I/O, vital signs and rhythm  - Monitor for S/S and trends of decreased cardiac output  - Administer and titrate ordered vasoactive medications to optimize hemodynamic stability  - Assess quality of pulses, skin color and temperature  - Assess for signs of decreased coronary artery perfusion  - Instruct patient to report change in severity of symptoms  Outcome: Progressing  Goal: Absence of cardiac dysrhythmias or at baseline rhythm  Description: INTERVENTIONS:  - Continuous cardiac monitoring, vital signs, obtain 12 lead EKG if ordered  - Administer antiarrhythmic and heart rate control medications as ordered  - Monitor electrolytes and administer replacement therapy as ordered  Outcome: Progressing     Problem: RESPIRATORY - ADULT  Goal: Achieves optimal ventilation and oxygenation  Description: INTERVENTIONS:  - Assess for changes in respiratory status  - Assess for changes in mentation and behavior  - Position to facilitate oxygenation and minimize respiratory effort  - Oxygen administered by appropriate delivery if ordered  - Initiate smoking cessation education as indicated  - Encourage broncho-pulmonary hygiene including cough, deep breathe, Incentive Spirometry  - Assess the need for suctioning and aspirate as needed  - Assess and instruct to report SOB or any respiratory difficulty  - Respiratory Therapy support as indicated  Outcome: Progressing     Problem: METABOLIC, FLUID AND ELECTROLYTES - ADULT  Goal: Electrolytes maintained within normal limits  Description: INTERVENTIONS:  - Monitor labs and assess patient for signs and symptoms of electrolyte imbalances  - Administer electrolyte replacement as ordered  - Monitor response to electrolyte replacements, including repeat lab results as appropriate  - Instruct patient on fluid and nutrition as appropriate  Outcome: Progressing  Goal: Fluid balance maintained  Description: INTERVENTIONS:  - Monitor labs   - Monitor I/O and WT  - Instruct patient on fluid and nutrition as appropriate  - Assess for signs & symptoms of volume excess or deficit  Outcome: Progressing  Goal: Glucose maintained within target range  Description: INTERVENTIONS:  - Monitor Blood Glucose as ordered  - Assess for signs and symptoms of hyperglycemia and hypoglycemia  - Administer ordered medications to maintain glucose within target range  - Assess nutritional intake and initiate nutrition service referral as needed  Outcome: Progressing     Problem: SKIN/TISSUE INTEGRITY - ADULT  Goal: Skin Integrity remains intact(Skin Breakdown Prevention)  Description: Assess:  -Perform Ashish assessment every   -Clean and moisturize skin every   -Inspect skin when repositioning, toileting, and assisting with ADLS  -Assess under medical devices such as  every   -Assess extremities for adequate circulation and sensation     Bed Management:  -Have minimal linens on bed & keep smooth, unwrinkled  -Change linens as needed when moist or perspiring  -Avoid sitting or lying in one position for more than  hours while in bed  -Keep HOB at degrees     Toileting:  -Offer bedside commode  -Assess for incontinence every   -Use incontinent care products after each incontinent episode such as     Activity:  -Mobilize patient  times a day  -Encourage activity and walks on unit  -Encourage or provide ROM exercises   -Turn and reposition patient every  Hours  -Use appropriate equipment to lift or move patient in bed  -Instruct/ Assist with weight shifting every  when out of bed in chair  -Consider limitation of chair time  hour intervals    Skin Care:  -Avoid use of baby powder, tape, friction and shearing, hot water or constrictive clothing  -Relieve pressure over bony prominences using   -Do not massage red bony areas    Next Steps:  -Teach patient strategies to minimize risks such as    -Consider consults to  interdisciplinary teams such as  Outcome: Progressing     Problem: HEMATOLOGIC - ADULT  Goal: Maintains hematologic stability  Description: INTERVENTIONS  - Assess for signs and symptoms of bleeding or hemorrhage  - Monitor labs  - Administer supportive blood products/factors as ordered and appropriate  Outcome: Progressing     Problem: MUSCULOSKELETAL - ADULT  Goal: Maintain or return mobility to safest level of function  Description: INTERVENTIONS:  - Assess patient's ability to carry out ADLs; assess patient's baseline for ADL function and identify physical deficits which impact ability to perform ADLs (bathing, care of mouth/teeth, toileting, grooming, dressing, etc )  - Assess/evaluate cause of self-care deficits   - Assess range of motion  - Assess patient's mobility  - Assess patient's need for assistive devices and provide as appropriate  - Encourage maximum independence but intervene and supervise when necessary  - Involve family in performance of ADLs  - Assess for home care needs following discharge   - Consider OT consult to assist with ADL evaluation and planning for discharge  - Provide patient education as appropriate  Outcome: Progressing

## 2022-11-18 NOTE — CASE MANAGEMENT
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 site [facility])   Patient Active Problem List    Diagnosis Date Noted   • Type 2 diabetes mellitus without complication, without long-term current use of insulin ([facility] ) 2022   • Thrombocytopenia ([place] [address] ) 2022   • Severe protein-calorie malnutrition ([place] [address] ) 2022   • Anemia of chronic disease 11/10/2022   • SIRS (systemic inflammatory response syndrome) ([place] [address] ) 2022   • Suspected Primary malignant neoplasm of left lung metastatic to other site [facility]) 2022   • Abnormal CT scan, gallbladder 2022   • Schizophrenia ([place] [address] ) 2022   • Elevated glucose 2022   • Pedal edema 2022      LOS (days): 15  Geometric Mean LOS (GMLOS) (days): 2 10  Days to GMLOS:-13 2     OBJECTIVE:  Risk of Unplanned Readmission Score: 26 42         Current admission status: Inpatient   Preferred Pharmacy:   [address] [place] [id number]  Phone: [phone] Fax: [phone]    Primary Care Provider: No primary care provider on file      Primary Insurance: MEDICARE  Secondary Insurance: [facility]    DISCHARGE DETAILS:    Discharge planning discussed with[de-identified] Merna Correa pts friend and decision maker  Freedom of Choice: Yes  Comments - Freedom of Choice: Discussed DC to facility  CM contacted family/caregiver?: Yes  Were Treatment Team discharge recommendations reviewed with patient/caregiver?: Yes  Did patient/caregiver verbalize understanding of patient care needs?: Yes  Were patient/caregiver advised of the risks associated with not following Treatment Team discharge recommendations?: Yes    Contacts  Patient Contacts: Alan Santana  Relationship to Patient[de-identified] Friend  Contact Method: Phone  Phone Number: [phone]  Reason/Outcome: Discharge Planning, Referral    [facility]         Is the patient interested in [facility] at discharge?: No    DME Referral Provided  Referral made for DME?: No    Other Referral/Resources/Interventions Provided:  Interventions: Short Term Rehab  Referral Comments: Pt to dc to [facility]         Treatment Team Recommendation: Short Term Rehab  Discharge Destination Plan[de-identified] Short Term Rehab  Transport at Discharge : [facility]  Dispatcher Contacted: Yes  Number/Name of Dispatcher: [facility]     ETA of Transport (Date): 11/18/22  ETA of Transport (Time): 1430  Transport Service Arrived: No  Transfer Mode: Stretcher  Accompanied by: Paramedic  Transfer Equipment: BLS devices           Additional Comments: Cm spoke with pts friend and decision maker Alan Santana regarding DC for today  Pt was accepted to [facility]  Bed accepted  Transport requested for 2:30 pm S  Transport was confirmed for 2:30 pm through [facility]  Pt friend, provider and nurse aware of dc plan for today

## 2022-11-18 NOTE — ASSESSMENT & PLAN NOTE
Lab Results   Component Value Date    HGBA1C 9 1 (H) 11/02/2022       Recent Labs     11/17/22  1146 11/17/22  1637 11/17/22  2233 11/18/22  0747   POCGLU 195* 119 240* 137       Blood Sugar Average: Last 72 hrs:  (P) 184     See mgx of elevated glucose

## 2022-11-18 NOTE — QUICK NOTE
Hgb 6 9 this evening - Hgb had initially responded appropriately to 1U PRBCs given in early AM  Stool occults ordered though no reports of melanotic or bloody stools  Additional 1U PRBCs ordered

## 2022-11-18 NOTE — PLAN OF CARE
Problem: Nutrition/Hydration-ADULT  Goal: Nutrient/Hydration intake appropriate for improving, restoring or maintaining nutritional needs  Description: Monitor and assess patient's nutrition/hydration status for malnutrition  Collaborate with interdisciplinary team and initiate plan and interventions as ordered  Monitor patient's weight and dietary intake as ordered or per policy  Utilize nutrition screening tool and intervene as necessary  Determine patient's food preferences and provide high-protein, high-caloric foods as appropriate       INTERVENTIONS:  - Monitor oral intake, urinary output, labs, and treatment plans  - Assess nutrition and hydration status and recommend course of action  - Evaluate amount of meals eaten  - Assist patient with eating if necessary   - Allow adequate time for meals  - Recommend/ encourage appropriate diets, oral nutritional supplements, and vitamin/mineral supplements  - Order, calculate, and assess calorie counts as needed  - Recommend, monitor, and adjust tube feedings and TPN/PPN based on assessed needs  - Assess need for intravenous fluids  - Provide specific nutrition/hydration education as appropriate  - Include patient/family/caregiver in decisions related to nutrition  Outcome: Progressing     Problem: MOBILITY - ADULT  Goal: Maintain or return to baseline ADL function  Description: INTERVENTIONS:  -  Assess patient's ability to carry out ADLs; assess patient's baseline for ADL function and identify physical deficits which impact ability to perform ADLs (bathing, care of mouth/teeth, toileting, grooming, dressing, etc )  - Assess/evaluate cause of self-care deficits   - Assess range of motion  - Assess patient's mobility; develop plan if impaired  - Assess patient's need for assistive devices and provide as appropriate  - Encourage maximum independence but intervene and supervise when necessary  - Involve family in performance of ADLs  - Assess for home care needs following discharge   - Consider OT consult to assist with ADL evaluation and planning for discharge  - Provide patient education as appropriate  Outcome: Progressing  Goal: Maintains/Returns to pre admission functional level  Description: INTERVENTIONS:  - Perform BMAT or MOVE assessment daily    - Set and communicate daily mobility goal to care team and patient/family/caregiver  - Collaborate with rehabilitation services on mobility goals if consulted  - Perform Range of Motion 3 times a day  - Reposition patient every 2 hours    - Dangle patient 3 times a day  - Stand patient 3 times a day  - Ambulate patient 3 times a day  - Out of bed to chair 3 times a day   - Out of bed for meals 3 times a day  - Out of bed for toileting  - Record patient progress and toleration of activity level   Outcome: Progressing     Problem: Prexisting or High Potential for Compromised Skin Integrity  Goal: Skin integrity is maintained or improved  Description: INTERVENTIONS:  - Identify patients at risk for skin breakdown  - Assess and monitor skin integrity  - Assess and monitor nutrition and hydration status  - Monitor labs   - Assess for incontinence   - Turn and reposition patient  - Assist with mobility/ambulation  - Relieve pressure over bony prominences  - Avoid friction and shearing  - Provide appropriate hygiene as needed including keeping skin clean and dry  - Evaluate need for skin moisturizer/barrier cream  - Collaborate with interdisciplinary team   - Patient/family teaching  - Consider wound care consult   Outcome: Progressing     Problem: PAIN - ADULT  Goal: Verbalizes/displays adequate comfort level or baseline comfort level  Description: Interventions:  - Encourage patient to monitor pain and request assistance  - Assess pain using appropriate pain scale  - Administer analgesics based on type and severity of pain and evaluate response  - Implement non-pharmacological measures as appropriate and evaluate response  - Consider cultural and social influences on pain and pain management  - Notify physician/advanced practitioner if interventions unsuccessful or patient reports new pain  Outcome: Progressing     Problem: INFECTION - ADULT  Goal: Absence or prevention of progression during hospitalization  Description: INTERVENTIONS:  - Assess and monitor for signs and symptoms of infection  - Monitor lab/diagnostic results  - Monitor all insertion sites, i e  indwelling lines, tubes, and drains  - Monitor endotracheal if appropriate and nasal secretions for changes in amount and color  - [place] appropriate cooling/warming therapies per order  - Administer medications as ordered  - Instruct and encourage patient and family to use good hand hygiene technique  - Identify and instruct in appropriate isolation precautions for identified infection/condition  Outcome: Progressing  Goal: Absence of fever/infection during neutropenic period  Description: INTERVENTIONS:  - Monitor WBC    Outcome: Progressing     Problem: SAFETY ADULT  Goal: Maintain or return to baseline ADL function  Description: INTERVENTIONS:  -  Assess patient's ability to carry out ADLs; assess patient's baseline for ADL function and identify physical deficits which impact ability to perform ADLs (bathing, care of mouth/teeth, toileting, grooming, dressing, etc )  - Assess/evaluate cause of self-care deficits   - Assess range of motion  - Assess patient's mobility; develop plan if impaired  - Assess patient's need for assistive devices and provide as appropriate  - Encourage maximum independence but intervene and supervise when necessary  - Involve family in performance of ADLs  - Assess for home care needs following discharge   - Consider OT consult to assist with ADL evaluation and planning for discharge  - Provide patient education as appropriate  Outcome: Progressing  Goal: Maintains/Returns to pre admission functional level  Description: INTERVENTIONS:  - Perform BMAT or MOVE assessment daily    - Set and communicate daily mobility goal to care team and patient/family/caregiver  - Collaborate with rehabilitation services on mobility goals if consulted  - Perform Range of Motion 3 times a day  - Reposition patient every 2 hours    - Dangle patient 3 times a day  - Stand patient 3 times a day  - Ambulate patient 3 times a day  - Out of bed to chair 3 times a day   - Out of bed for meals 3 times a day  - Out of bed for toileting  - Record patient progress and toleration of activity level   Outcome: Progressing  Goal: Patient will remain free of falls  Description: INTERVENTIONS:  - Educate patient/family on patient safety including physical limitations  - Instruct patient to call for assistance with activity   - Consult OT/PT to assist with strengthening/mobility   - Keep Call bell within reach  - Keep bed low and locked with side rails adjusted as appropriate  - Keep care items and personal belongings within reach  - Initiate and maintain comfort rounds  - Make Fall Risk Sign visible to staff  - Offer Toileting every 2 Hours, in advance of need  - Initiate/Maintain alarm  - Obtain necessary fall risk management equipment:   - Apply yellow socks and bracelet for high fall risk patients  - Consider moving patient to room near nurses station  Outcome: Progressing     Problem: DISCHARGE PLANNING  Goal: Discharge to home or other facility with appropriate resources  Description: INTERVENTIONS:  - Identify barriers to discharge w/patient and caregiver  - Arrange for needed discharge resources and transportation as appropriate  - Identify discharge learning needs (meds, wound care, etc )  - Arrange for interpretive services to assist at discharge as needed  - Refer to Case Management Department for coordinating discharge planning if the patient needs post-hospital services based on physician/advanced practitioner order or complex needs related to functional status, cognitive ability, or social support system  Outcome: Progressing     Problem: Knowledge Deficit  Goal: Patient/family/caregiver demonstrates understanding of disease process, treatment plan, medications, and discharge instructions  Description: Complete learning assessment and assess knowledge base    Interventions:  - Provide teaching at level of understanding  - Provide teaching via preferred learning methods  Outcome: Progressing     Problem: Potential for Falls  Goal: Patient will remain free of falls  Description: INTERVENTIONS:  - Educate patient/family on patient safety including physical limitations  - Instruct patient to call for assistance with activity   - Consult OT/PT to assist with strengthening/mobility   - Keep Call bell within reach  - Keep bed low and locked with side rails adjusted as appropriate  - Keep care items and personal belongings within reach  - Initiate and maintain comfort rounds  - Make Fall Risk Sign visible to staff  - Offer Toileting every 2 Hours, in advance of need  - Initiate/Maintain alarm  - Obtain necessary fall risk management equipment:   - Apply yellow socks and bracelet for high fall risk patients  - Consider moving patient to room near nurses station  Outcome: Progressing     Problem: CARDIOVASCULAR - ADULT  Goal: Maintains optimal cardiac output and hemodynamic stability  Description: INTERVENTIONS:  - Monitor I/O, vital signs and rhythm  - Monitor for S/S and trends of decreased cardiac output  - Administer and titrate ordered vasoactive medications to optimize hemodynamic stability  - Assess quality of pulses, skin color and temperature  - Assess for signs of decreased coronary artery perfusion  - Instruct patient to report change in severity of symptoms  Outcome: Progressing  Goal: Absence of cardiac dysrhythmias or at baseline rhythm  Description: INTERVENTIONS:  - Continuous cardiac monitoring, vital signs, obtain 12 lead EKG if ordered  - Administer antiarrhythmic and heart rate control medications as ordered  - Monitor electrolytes and administer replacement therapy as ordered  Outcome: Progressing     Problem: RESPIRATORY - ADULT  Goal: Achieves optimal ventilation and oxygenation  Description: INTERVENTIONS:  - Assess for changes in respiratory status  - Assess for changes in mentation and behavior  - Position to facilitate oxygenation and minimize respiratory effort  - Oxygen administered by appropriate delivery if ordered  - Initiate smoking cessation education as indicated  - Encourage broncho-pulmonary hygiene including cough, deep breathe, Incentive Spirometry  - Assess the need for suctioning and aspirate as needed  - Assess and instruct to report SOB or any respiratory difficulty  - Respiratory Therapy support as indicated  Outcome: Progressing     Problem: METABOLIC, FLUID AND ELECTROLYTES - ADULT  Goal: Electrolytes maintained within normal limits  Description: INTERVENTIONS:  - Monitor labs and assess patient for signs and symptoms of electrolyte imbalances  - Administer electrolyte replacement as ordered  - Monitor response to electrolyte replacements, including repeat lab results as appropriate  - Instruct patient on fluid and nutrition as appropriate  Outcome: Progressing  Goal: Fluid balance maintained  Description: INTERVENTIONS:  - Monitor labs   - Monitor I/O and WT  - Instruct patient on fluid and nutrition as appropriate  - Assess for signs & symptoms of volume excess or deficit  Outcome: Progressing  Goal: Glucose maintained within target range  Description: INTERVENTIONS:  - Monitor Blood Glucose as ordered  - Assess for signs and symptoms of hyperglycemia and hypoglycemia  - Administer ordered medications to maintain glucose within target range  - Assess nutritional intake and initiate nutrition service referral as needed  Outcome: Progressing     Problem: SKIN/TISSUE INTEGRITY - ADULT  Goal: Skin Integrity remains intact(Skin Breakdown Prevention)  Description: Assess:  -Perform Ashish assessment   -Clean and moisturize skin   -Inspect skin when repositioning, toileting, and assisting with ADLS  -Assess under medical devices  -Assess extremities for adequate circulation and sensation     Bed Management:  -Have minimal linens on bed & keep smooth, unwrinkled  -Change linens as needed when moist or perspiring  -Avoid sitting or lying in one position for more than 2 hours while in bed  -Keep HOB at 35 degrees     Toileting:  -Offer bedside commode  -Assess for incontinence  -Use incontinent care products after each incontinent episode    Activity:  -Mobilize patient 3 times a day  -Encourage activity and walks on unit  -Encourage or provide ROM exercises   -Turn and reposition patient every 2 Hours  -Use appropriate equipment to lift or move patient in bed  -Instruct/ Assist with weight shifting every 2 when out of bed in chair  -Consider limitation of chair time 2 hour intervals    Skin Care:  -Avoid use of baby powder, tape, friction and shearing, hot water or constrictive clothing  -Relieve pressure over bony prominences  -Do not massage red bony areas    Next Steps:  -Teach patient strategies to minimize risks   -Consider consults to  interdisciplinary teams  Outcome: Progressing     Problem: HEMATOLOGIC - ADULT  Goal: Maintains hematologic stability  Description: INTERVENTIONS  - Assess for signs and symptoms of bleeding or hemorrhage  - Monitor labs  - Administer supportive blood products/factors as ordered and appropriate  Outcome: Progressing     Problem: MUSCULOSKELETAL - ADULT  Goal: Maintain or return mobility to safest level of function  Description: INTERVENTIONS:  - Assess patient's ability to carry out ADLs; assess patient's baseline for ADL function and identify physical deficits which impact ability to perform ADLs (bathing, care of mouth/teeth, toileting, grooming, dressing, etc )  - Assess/evaluate cause of self-care deficits   - Assess range of motion  - Assess patient's mobility  - Assess patient's need for assistive devices and provide as appropriate  - Encourage maximum independence but intervene and supervise when necessary  - Involve family in performance of ADLs  - Assess for home care needs following discharge   - Consider OT consult to assist with ADL evaluation and planning for discharge  - Provide patient education as appropriate  Outcome: Progressing

## 2022-11-18 NOTE — NURSING NOTE
Report called and given to [address] at [facility] at [phone]  All questions answered

## 2022-11-19 VITALS
WEIGHT: 160 LBS | BODY MASS INDEX: 23.7 KG/M2 | HEIGHT: 69 IN | HEART RATE: 84 BPM | SYSTOLIC BLOOD PRESSURE: 90 MMHG | DIASTOLIC BLOOD PRESSURE: 30 MMHG | OXYGEN SATURATION: 96 % | TEMPERATURE: 97.7 F | RESPIRATION RATE: 16 BRPM

## 2022-11-22 ENCOUNTER — TELEPHONE (OUTPATIENT)
Dept: ENDOCRINOLOGY | Facility: CLINIC | Age: 62
End: 2022-11-22

## 2022-11-22 NOTE — TELEPHONE ENCOUNTER
Received referral for [facility] for patient to contact office to schedule a Hospital Follow-up appointment with Dr Yenifer Malik  Patient had consult with provider 11/14/2022

## 2022-12-05 ENCOUNTER — TELEPHONE (OUTPATIENT)
Dept: HEMATOLOGY ONCOLOGY | Facility: HOSPITAL | Age: 62
End: 2022-12-05

## 2022-12-05 NOTE — TELEPHONE ENCOUNTER
NO SHOW  Called pt and received recording that the number I dial is non reachable    Unable to leave a message to reschedule appt

## 2023-01-13 ENCOUNTER — TELEPHONE (OUTPATIENT)
Dept: HEMATOLOGY ONCOLOGY | Facility: CLINIC | Age: 63
End: 2023-01-13

## 2023-01-13 NOTE — TELEPHONE ENCOUNTER
Made a attempt to reschedule a no show hospital follow up, received a recording that the number I dial is non reachable  Unable to leave a message to reschedule the appointment

## 2023-01-17 ENCOUNTER — DOCUMENTATION (OUTPATIENT)
Dept: HEMATOLOGY ONCOLOGY | Facility: CLINIC | Age: 63
End: 2023-01-17

## 2023-01-17 NOTE — PROGRESS NOTES
Pt was admitted on 11/2/22 and d/c'd on 11/18/22  IR tissue biopsy performed - 11/8- positive for metastatic lung adenocarcinoma  He had a HFU scheduled on 12/5/22 with Dr Ion Toney that unfortunatly he no showed too  Staff, hopeline and myself have made many attempts to contact patient with no success  Per d/c note he was d/c'd to [facility] [place]  Called in at [phone] and confirmed  Patient continues not to have insight into his condition  He does not have capacity to make medical decisions at this time

## 2023-04-26 ENCOUNTER — TELEPHONE (OUTPATIENT)
Dept: PSYCHIATRY | Facility: CLINIC | Age: 63
End: 2023-04-26

## 2023-04-26 NOTE — TELEPHONE ENCOUNTER
Attempted to call patient in regards to [facility] therapy referral  Patient's phone number is not a working   Patient was added to PF wait list